# Patient Record
Sex: MALE | Race: WHITE | NOT HISPANIC OR LATINO | Employment: UNEMPLOYED | URBAN - NONMETROPOLITAN AREA
[De-identification: names, ages, dates, MRNs, and addresses within clinical notes are randomized per-mention and may not be internally consistent; named-entity substitution may affect disease eponyms.]

---

## 2017-04-03 ENCOUNTER — COMMUNICATION - GICH (OUTPATIENT)
Dept: FAMILY MEDICINE | Facility: OTHER | Age: 62
End: 2017-04-03

## 2017-04-03 DIAGNOSIS — Z86.59 PERSONAL HISTORY OF OTHER MENTAL AND BEHAVIORAL DISORDERS: ICD-10-CM

## 2017-04-11 ENCOUNTER — HISTORY (OUTPATIENT)
Dept: FAMILY MEDICINE | Facility: OTHER | Age: 62
End: 2017-04-11

## 2017-04-11 ENCOUNTER — AMBULATORY - GICH (OUTPATIENT)
Dept: FAMILY MEDICINE | Facility: OTHER | Age: 62
End: 2017-04-11

## 2017-04-11 ENCOUNTER — OFFICE VISIT - GICH (OUTPATIENT)
Dept: FAMILY MEDICINE | Facility: OTHER | Age: 62
End: 2017-04-11

## 2017-04-11 DIAGNOSIS — Z00.00 ENCOUNTER FOR GENERAL ADULT MEDICAL EXAMINATION WITHOUT ABNORMAL FINDINGS: ICD-10-CM

## 2017-04-11 DIAGNOSIS — N52.1 ERECTILE DYSFUNCTION DUE TO DISEASES CLASSIFIED ELSEWHERE: ICD-10-CM

## 2017-04-11 DIAGNOSIS — E78.5 HYPERLIPIDEMIA: ICD-10-CM

## 2017-04-11 DIAGNOSIS — Z86.59 PERSONAL HISTORY OF OTHER MENTAL AND BEHAVIORAL DISORDERS: ICD-10-CM

## 2017-04-14 ENCOUNTER — AMBULATORY - GICH (OUTPATIENT)
Dept: LAB | Facility: OTHER | Age: 62
End: 2017-04-14

## 2017-04-14 DIAGNOSIS — E78.5 HYPERLIPIDEMIA: ICD-10-CM

## 2017-04-14 DIAGNOSIS — Z00.00 ENCOUNTER FOR GENERAL ADULT MEDICAL EXAMINATION WITHOUT ABNORMAL FINDINGS: ICD-10-CM

## 2017-04-14 LAB
A/G RATIO - HISTORICAL: 1.5 (ref 1–2)
ABSOLUTE BASOPHILS - HISTORICAL: 0.1 THOU/CU MM
ABSOLUTE EOSINOPHILS - HISTORICAL: 0.3 THOU/CU MM
ABSOLUTE LYMPHOCYTES - HISTORICAL: 2.9 THOU/CU MM (ref 0.9–2.9)
ABSOLUTE MONOCYTES - HISTORICAL: 0.7 THOU/CU MM
ABSOLUTE NEUTROPHILS - HISTORICAL: 4.5 THOU/CU MM (ref 1.7–7)
ALBUMIN SERPL-MCNC: 4.3 G/DL (ref 3.5–5.7)
ALP SERPL-CCNC: 72 IU/L (ref 34–104)
ALT (SGPT) - HISTORICAL: 31 IU/L (ref 7–52)
ANION GAP - HISTORICAL: 9 (ref 5–18)
AST SERPL-CCNC: 20 IU/L (ref 13–39)
BASOPHILS # BLD AUTO: 1.4 %
BILIRUB SERPL-MCNC: 0.6 MG/DL (ref 0.3–1)
BILIRUB UR QL: NEGATIVE
BUN SERPL-MCNC: 20 MG/DL (ref 7–25)
BUN/CREAT RATIO - HISTORICAL: 22
CALCIUM SERPL-MCNC: 9.3 MG/DL (ref 8.6–10.3)
CHLORIDE SERPLBLD-SCNC: 105 MMOL/L (ref 98–107)
CHOL/HDL RATIO - HISTORICAL: 6.16
CHOLESTEROL TOTAL: 234 MG/DL
CLARITY, URINE: CLEAR CLARITY
CO2 SERPL-SCNC: 23 MMOL/L (ref 21–31)
COLOR UR: YELLOW COLOR
CREAT SERPL-MCNC: 0.92 MG/DL (ref 0.7–1.3)
EOSINOPHIL NFR BLD AUTO: 3 %
ERYTHROCYTE [DISTWIDTH] IN BLOOD BY AUTOMATED COUNT: 11.9 % (ref 11.5–15.5)
GFR IF NOT AFRICAN AMERICAN - HISTORICAL: >60 ML/MIN/1.73M2
GLOBULIN - HISTORICAL: 2.9 G/DL (ref 2–3.7)
GLUCOSE SERPL-MCNC: 97 MG/DL (ref 70–105)
GLUCOSE URINE: NEGATIVE MG/DL
HCT VFR BLD AUTO: 50.4 % (ref 37–53)
HDLC SERPL-MCNC: 38 MG/DL (ref 23–92)
HEMOGLOBIN: 17.1 G/DL (ref 13.5–17.5)
KETONES UR QL: NEGATIVE MG/DL
LDLC SERPL CALC-MCNC: 154 MG/DL
LEUKOCYTE ESTERASE URINE: NEGATIVE
LYMPHOCYTES NFR BLD AUTO: 34.9 % (ref 20–44)
MCH RBC QN AUTO: 33.5 PG (ref 26–34)
MCHC RBC AUTO-ENTMCNC: 33.9 G/DL (ref 32–36)
MCV RBC AUTO: 99 FL (ref 80–100)
MONOCYTES NFR BLD AUTO: 7.7 %
NEUTROPHILS NFR BLD AUTO: 53.1 % (ref 42–72)
NITRITE UR QL STRIP: NEGATIVE
NON-HDL CHOLESTEROL - HISTORICAL: 196 MG/DL
OCCULT BLOOD,URINE - HISTORICAL: NEGATIVE
PATIENT STATUS - HISTORICAL: ABNORMAL
PH UR: 5 [PH]
PLATELET # BLD AUTO: 273 THOU/CU MM (ref 140–440)
PMV BLD: 9.2 FL (ref 6.5–11)
POTASSIUM SERPL-SCNC: 3.9 MMOL/L (ref 3.5–5.1)
PROT SERPL-MCNC: 7.2 G/DL (ref 6.4–8.9)
PROTEIN QUALITATIVE,URINE - HISTORICAL: NEGATIVE MG/DL
RED BLOOD COUNT - HISTORICAL: 5.1 MIL/CU MM (ref 4.3–5.9)
SODIUM SERPL-SCNC: 137 MMOL/L (ref 133–143)
SP GR UR STRIP: >=1.03
TRIGL SERPL-MCNC: 212 MG/DL
UROBILINOGEN,QUALITATIVE - HISTORICAL: NORMAL EU/DL
WHITE BLOOD COUNT - HISTORICAL: 8.4 THOU/CU MM (ref 4.5–11)

## 2017-05-15 ENCOUNTER — COMMUNICATION - GICH (OUTPATIENT)
Dept: FAMILY MEDICINE | Facility: OTHER | Age: 62
End: 2017-05-15

## 2017-05-15 ENCOUNTER — HISTORY (OUTPATIENT)
Dept: EMERGENCY MEDICINE | Facility: OTHER | Age: 62
End: 2017-05-15

## 2017-10-08 ENCOUNTER — OFFICE VISIT - GICH (OUTPATIENT)
Dept: FAMILY MEDICINE | Facility: OTHER | Age: 62
End: 2017-10-08

## 2017-10-08 ENCOUNTER — HISTORY (OUTPATIENT)
Dept: FAMILY MEDICINE | Facility: OTHER | Age: 62
End: 2017-10-08

## 2017-10-08 DIAGNOSIS — J01.40 ACUTE PANSINUSITIS: ICD-10-CM

## 2017-11-14 ENCOUNTER — COMMUNICATION - GICH (OUTPATIENT)
Dept: FAMILY MEDICINE | Facility: OTHER | Age: 62
End: 2017-11-14

## 2017-11-14 DIAGNOSIS — Z86.59 PERSONAL HISTORY OF OTHER MENTAL AND BEHAVIORAL DISORDERS: ICD-10-CM

## 2017-12-27 NOTE — PROGRESS NOTES
Patient Information     Patient Name MRN Sex Garland Stein [id number] Male 1955      Progress Notes by Sierra Ibanez NP at 10/8/2017 10:30 AM     Author:  Sierra Ibanez NP Service:  (none) Author Type:  PHYS- Nurse Practitioner     Filed:  10/8/2017 12:30 PM Encounter Date:  10/8/2017 Status:  Signed     :  Sierra Ibanez NP (PHYS- Nurse Practitioner)            Nursing Notes:   Layne Bhandari  10/8/2017 10:58 AM  Signed  Patient presents to the clinic for sinus drainage and head congestion that started around the end of July. Patient reports having green and yellow colored sinus drainage.  Layne MCKEE CMA.......10/8/2017..10:24 AM    SUBJECTIVE:    Garland Rice is a 62 y.o. male who presents for sinus drainage and head congestions off and on since July.     Sinus Problem   This is a new problem. The current episode started more than 1 month ago. The problem has been waxing and waning (Off and on, 2 weeks on, 1 week off, 3 weeks on, ect. ) since onset. There has been no fever. The pain is moderate. Associated symptoms include congestion, coughing, headaches, a hoarse voice, sinus pressure and sneezing. Pertinent negatives include no ear pain, shortness of breath, sore throat or swollen glands. Past treatments include oral decongestants. The treatment provided mild relief.       Current Outpatient Prescriptions on File Prior to Visit       Medication  Sig Dispense Refill     ALPRAZolam (XANAX) 0.5 mg tablet Take 1 tablet by mouth 2 times daily if needed. 180 tablet 3     multivitamin (MVI) tablet Take 1 tablet by mouth once daily.  0     sildenafil citrate (VIAGRA) 100 mg tablet Take 0.5-1 tablets by mouth once daily if needed for Erectile Dysfunction. Take 30min to 4 hours before sexual activity. Max 100mg/24hr. 12 tablet 12     simvastatin (ZOCOR) 40 mg tablet Take 1 tablet by mouth once daily with evening meal. 90 tablet 3     No current facility-administered medications  "on file prior to visit.     and   Patient Active Problem List      Diagnosis Date Noted     H/O adenomatous polyp of colon 03/02/2016     Controlled substance agreement signed 4/11/17 03/01/2016     BACK PAIN 10/17/2011     Diverticulosis of sigmoid colon 01/11/2010     ERECTILE DYSFUNCTION      IRRITABLE BOWEL SYNDROME      ANXIETY DISORDER, HX OF      Hyperlipidemia        REVIEW OF SYSTEMS:  Review of Systems   HENT: Positive for congestion, hoarse voice, sinus pressure and sneezing. Negative for ear pain and sore throat.    Respiratory: Positive for cough. Negative for shortness of breath.    Neurological: Positive for headaches.       OBJECTIVE:  /78  Pulse 80  Temp 97.2  F (36.2  C) (Tympanic)  Ht 1.892 m (6' 2.5\")  Wt 111.8 kg (246 lb 6 oz)  BMI 31.21 kg/m2    EXAM:   Physical Exam   Constitutional: He is well-developed, well-nourished, and in no distress.   HENT:   Head: Normocephalic and atraumatic.   Right Ear: Tympanic membrane and ear canal normal.   Left Ear: Tympanic membrane and ear canal normal.   Nose: Mucosal edema and rhinorrhea present. Right sinus exhibits maxillary sinus tenderness and frontal sinus tenderness. Left sinus exhibits maxillary sinus tenderness and frontal sinus tenderness.   Mouth/Throat: Uvula is midline, oropharynx is clear and moist and mucous membranes are normal.   Eyes: Conjunctivae are normal.   Neck: Neck supple.   Cardiovascular: Normal rate, regular rhythm and normal heart sounds.    Pulmonary/Chest: Effort normal and breath sounds normal. No respiratory distress. He has no wheezes. He has no rales.   Lymphadenopathy:     He has no cervical adenopathy.   Nursing note and vitals reviewed.      ASSESSMENT/PLAN:    ICD-10-CM    1. Subacute pansinusitis J01.40 amoxicillin-clavulanate 875-125 mg tablet (AUGMENTIN)        Plan:  HArd to tell if this is just colds off and on. New s/s for a week. Discussed the likelihood of viral illnesses off and on. Without a CT " hard to tell. Will treat incase this is truly bacterial. OTC and home cares gone over.       SALIMA CHANCE NP ....................  10/8/2017   12:30 PM

## 2017-12-28 NOTE — PATIENT INSTRUCTIONS
Patient Information     Patient Name MRN Garland White [id number] Male 1955      Patient Instructions by Sierra Ibanez NP at 10/8/2017 10:30 AM     Author:  Sierra Ibanez NP Service:  (none) Author Type:  PHYS- Nurse Practitioner     Filed:  10/8/2017 11:05 AM Encounter Date:  10/8/2017 Status:  Signed     :  Sierra Ibanez NP (PHYS- Nurse Practitioner)            What you should do:    Wash your hands often with soap and water    Get plenty of rest and fluids    Apply warm compresses to your face    Use sinus rinses, a humidifier or a vaporizer to add more moisture to your sinus tissues.    Think about using a Neti pot    Take acetaminophen (Tylenol ) or ibuprofen (Advil ) for pain    How will you know this plan is not working - warning signs you should watch for:    Pain or swelling around your eyes    Swollen, painful forehead and/or facial (sinus) pain    When should you be seen again?    If you develop severe headache, double vision, stiff neck or confusion, return right away.    If you have any of the warning signs listed above, return in 1 to 2 days.    If your symptoms do not get better in 7 to 10 days, return at that time.

## 2017-12-28 NOTE — TELEPHONE ENCOUNTER
"Patient Information     Patient Name MRN Garland White [id number] Male 1955      Telephone Encounter by Kathie Siddiqi RN at 2017  8:21 AM     Author:  Kathie Siddiqi RN Service:  (none) Author Type:  NURS- Registered Nurse     Filed:  2017  8:26 AM Encounter Date:  2017 Status:  Signed     :  Kathie Siddiqi RN (NURS- Registered Nurse)            This is a Refill request from: [facility]   Name of Medication: Alprazolam  Quantity requested: 180  Last fill date: 17  Due for refill: yes  Last visit with SHARATH XAVIER was on: 2017 in [facility]  PCP:  Sharath Xavier MD  Controlled Substance Agreement:  2017   Diagnosis r/t this medication request: anxiety disorder    Per last visit note, \"Alprazolam 0.5 twice daily has worked well for years with no change in use ; has contract and will refill 1 year\"    This medication cannot be filled for one year as prescription expires after 6 months. Patient will need new prescription sent to [facility].     Unable to complete prescription refill per RN Medication Refill Policy.................... Kathie Siddiqi RN ....................  2017   8:23 AM          "

## 2017-12-28 NOTE — TELEPHONE ENCOUNTER
Patient Information     Patient Name MRN Sex Garland Stein [id number] Male 1955      Telephone Encounter by Jesi Cardona at 2017  9:09 AM     Author:  Jesi Cardona Service:  (none) Author Type:  (none)     Filed:  2017  9:09 AM Encounter Date:  2017 Status:  Signed     :  Jesi Cardona            Prescription was faxed to patient's pharmacy.    Jesi Cardona LPN....................2017 9:09 AM

## 2017-12-30 NOTE — NURSING NOTE
Patient Information     Patient Name MRN Sex Garland Stein [id number] Male 1955      Nursing Note by Layne Bhandari at 10/8/2017 10:30 AM     Author:  Layne Bhandari Service:  (none) Author Type:  (none)     Filed:  10/8/2017 10:58 AM Encounter Date:  10/8/2017 Status:  Signed     :  aLyne Bhandari            Patient presents to the clinic for sinus drainage and head congestion that started around the end of July. Patient reports having green and yellow colored sinus drainage.  Layne MCKEE, RASHID.......10/8/2017..10:24 AM

## 2018-01-04 NOTE — NURSING NOTE
Patient Information     Patient Name MRN Sex Garland Stein [id number] Male 1955      Nursing Note by Jesi Cardona at 2017 11:00 AM     Author:  Jesi Cardona Service:  (none) Author Type:  (none)     Filed:  2017 11:03 AM Encounter Date:  2017 Status:  Signed     :  Jesi Cardona            Patient presents to the clinic for an annual physical  Jesi TONY Cardona LPN....................2017 10:53 AM

## 2018-01-04 NOTE — TELEPHONE ENCOUNTER
Patient Information     Patient Name MRN Sex Garland Stein [id number] Male 1955      Telephone Encounter by Jesi Cardona at 4/3/2017  4:47 PM     Author:  Jesi Cardona Service:  (none) Author Type:  (none)     Filed:  4/3/2017  4:47 PM Encounter Date:  4/3/2017 Status:  Signed     :  Jesi Cardona            Prescription was faxed to patient's pharmacy.    Jesi Cardona LPN....................4/3/2017 4:47 PM

## 2018-01-04 NOTE — H&P
Patient Information     Patient Name MRN Garland White [id number] Male 1955      H&P by Sharath Xavier MD at 2017 11:00 AM     Author:  Sharath Xavier MD Service:  (none) Author Type:  Physician     Filed:  2017 11:46 AM Encounter Date:  2017 Status:  Signed     :  Sharath Xavier MD (Physician)            Nursing Notes:   Jesi Cardona  2017 11:03 AM  Signed  Patient presents to the clinic for an annual physical  Jesi GALLOJoe Cardona LPN....................2017 10:53 AM    Garland Rice is a 62 y.o. male who presents for   Chief Complaint     Patient presents with       Physical      Annual Px     HPI: Mr. Rice comes in for healthcare maintenance; he does smoke 1/2 ppd and understands the risks. He has LDL of 139 last year and will get fasting lipids in next week.  He takes alprazolam 0.5 twice daily and it is very helpful for him; he has a contract and will renew it. We discussed lab   Past Medical History:     Diagnosis  Date     History of tobacco use      Hx of appendectomy      Hx of urethral stricture      Medullary sponge kidney      Tubular adenoma 2010    w/ severe atypia noted on colonoscopy       Past Surgical History:      Procedure  Laterality Date     APPENDECTOMY       COLONOSCOPY DIAGNOSTIC  3/7/16    F/U        COLONOSCOPY SCREENING      follow-up recommended in 6 months        COLONOSCOPY SCREENING  2010    F/U        URETHROPLASTY       Family History       Problem   Relation Age of Onset     Good Health  Mother      69       Good Health  Father      70       Other  Brother      Hx of glaucoma       Other  Other      No FHx DM or CAD, no cancers in first degree relatives.       Coronary artery disease  Maternal Grandfather      60- lived into 80s- smoked early       Cancer-colon  No Family History      Current Outpatient Prescriptions       Medication  Sig Dispense Refill     ALPRAZolam (XANAX) 0.5 mg  tablet TAKE ONE TABLET BY MOUTH TWICE DAILY AS NEEDED 180 tablet 0     multivitamin (MVI) tablet Take 1 tablet by mouth once daily.  0     No current facility-administered medications for this visit.      Medications have been reviewed by me and are current to the best of my knowledge and ability.    No Known Allergies  REVIEW OF SYSTEMS:  Constitutional: Negative  Eyes: reads with cheaters  Ears, nose, mouth, throat, and face: hearing loss.  Respiratory: Negative  Cardiovascular: Negative  Gastrointestinal: occasional hemorrhoidal irritation- normal colon exam a year ago  Genitourinary:Negative/ some BPH symptoms/ had urethralplasty after urethral stricture surgery  Integument/breast: dry   Musculoskeletal:Negative  Neurological: Negative  Psychiatric: Negative  Allergic/Immunologic: Negative     EXAM:   Vitals:     04/11/17 1058   BP: 118/66   Weight: 114.2 kg (251 lb 12.8 oz)   Height: 1.829 m (6')     General Appearance: Pleasant, alert, appropriate appearance for age. No acute distress  Ear Exam: Normal TM's bilaterally. Normal auditory canals and external ears. Non-tender.  OroPharynx Exam: Dental hygiene adequate. Normal buccal mucosa. Normal pharynx.  Neck Exam: Supple, no masses or nodes.  Thyroid Exam: No nodules or enlargement.  Chest/Respiratory Exam: Normal chest wall and respirations. Clear to auscultation.  Cardiovascular Exam: Regular rate and rhythm. S1, S2, no murmur, click, gallop, or rubs.  Gastrointestinal Exam: Soft, nontender, no abnormal masses or organomegaly.  Rectal Exam: good sphincter tone  Genitourinary Exam Male: Normal.  Lymphatic Exam: Non-palpable nodes in neck, clavicular, axillary, or inguinal regions.  Musculoskeletal Exam: Back is straight and non-tender, full ROM of upper and lower extremities.  Skin: no rash or abnormalities  Neurologic Exam: Nonfocal;  normal gross motor movement, tone, and coordination. No tremor.  Psychiatric Exam: Alert and oriented, appropriate  affect.  ASSESSMENT AND PLAN:  1. ANXIETY DISORDER, HX OF  Alprazolam 0.5 twice daily has worked well for years with no change in use ; has contract and will refill 1 year    2. Hyperlipidemia, unspecified hyperlipidemia type  Lab / should start med as his risk factors are real/ add ASA

## 2018-01-04 NOTE — PROGRESS NOTES
Patient Information     Patient Name MRN Sex Garland Stein [id number] Male 1955      Progress Notes by Sharath Xavier MD at 2017 11:00 AM     Author:  Sharath Xavier MD Service:  (none) Author Type:  Physician     Filed:  2017 11:46 AM Encounter Date:  2017 Status:  Signed     :  Sharath Xavier MD (Physician)            See HP

## 2018-01-04 NOTE — TELEPHONE ENCOUNTER
Patient Information     Patient Name MRN Sex Garland Stein [id number] Male 1955      Telephone Encounter by Sharath Xavier MD at 2017  2:18 PM     Author:  Sharath Xavier MD Service:  (none) Author Type:  Physician     Filed:  2017  2:18 PM Encounter Date:  2017 Status:  Signed     :  Sharath Xavier MD (Physician)            Will do

## 2018-01-04 NOTE — TELEPHONE ENCOUNTER
Patient Information     Patient Name MRN Sex Garland Stein [id number] Male 1955      Telephone Encounter by Kathie Siddiqi RN at 4/3/2017  2:22 PM     Author:  Kathie Siddiqi RN Service:  (none) Author Type:  NURS- Registered Nurse     Filed:  4/3/2017  2:34 PM Encounter Date:  4/3/2017 Status:  Signed     :  Kathie Siddiqi RN (NURS- Registered Nurse)            This is a Refill request from: [facility]   Name of Medication: Xanax  Quantity requested: 180  Last fill date: 9/15/2016  Due for refill: 3/15/2017  Last visit with SHARATH XAVIER was on: 2016 in [facility]  PCP:  Sharath Xavier MD  Controlled Substance Agreement:  2016   Diagnosis r/t this medication request: Anxiety     Patient is due for medication management appointment. Limited refill requested at this time. [employer] message and letter sent for reminder to patient.   Unable to complete prescription refill per RN Medication Refill Policy.................... Kathie Siddiqi RN ....................  4/3/2017   2:24 PM

## 2018-01-05 NOTE — TELEPHONE ENCOUNTER
Patient Information     Patient Name MRN Sex Garland Stein [id number] Male 1955      Telephone Encounter by Jesi Cardona at 5/15/2017  4:26 PM     Author:  Jesi Cardona Service:  (none) Author Type:  (none)     Filed:  5/15/2017  4:29 PM Encounter Date:  5/15/2017 Status:  Signed     :  Jesi Cardona            Patient has a swollen calf, it started a couple of days ago.    No injury, it doesn't really feel warm to the touch but the skin feels tight and it's definitely swollen.    Patient was advised he should be seen today.  He is babysitting for another hour so will go to the ED when he can.    Jesi Cardona LPN....................5/15/2017 4:29 PM

## 2018-01-17 PROBLEM — K58.9 IRRITABLE BOWEL SYNDROME: Status: ACTIVE | Noted: 2018-01-17

## 2018-01-17 PROBLEM — F41.9 ANXIETY: Status: ACTIVE | Noted: 2018-01-17

## 2018-01-17 PROBLEM — E78.5 HYPERLIPIDEMIA: Status: ACTIVE | Noted: 2018-01-17

## 2018-01-17 PROBLEM — F52.8 PSYCHOSEXUAL DYSFUNCTION WITH INHIBITED SEXUAL EXCITEMENT: Status: ACTIVE | Noted: 2018-01-17

## 2018-01-17 RX ORDER — ALPRAZOLAM 0.5 MG
0.5 TABLET ORAL 2 TIMES DAILY PRN
COMMUNITY
Start: 2017-11-16 | End: 2018-02-27

## 2018-01-17 RX ORDER — SILDENAFIL 100 MG/1
50-100 TABLET, FILM COATED ORAL PRN
COMMUNITY
Start: 2017-04-11 | End: 2018-02-27 | Stop reason: DRUGHIGH

## 2018-01-17 RX ORDER — SIMVASTATIN 40 MG
40 TABLET ORAL
COMMUNITY
Start: 2017-04-11 | End: 2018-02-27

## 2018-01-17 RX ORDER — DIPHENOXYLATE HYDROCHLORIDE AND ATROPINE SULFATE 2.5; .025 MG/1; MG/1
1 TABLET ORAL DAILY
COMMUNITY

## 2018-01-27 VITALS
WEIGHT: 251.8 LBS | DIASTOLIC BLOOD PRESSURE: 66 MMHG | HEIGHT: 72 IN | BODY MASS INDEX: 34.1 KG/M2 | SYSTOLIC BLOOD PRESSURE: 118 MMHG

## 2018-01-27 VITALS
HEIGHT: 75 IN | HEART RATE: 80 BPM | WEIGHT: 246.38 LBS | TEMPERATURE: 97.2 F | BODY MASS INDEX: 30.64 KG/M2 | DIASTOLIC BLOOD PRESSURE: 78 MMHG | SYSTOLIC BLOOD PRESSURE: 132 MMHG

## 2018-02-27 ENCOUNTER — TELEPHONE (OUTPATIENT)
Dept: FAMILY MEDICINE | Facility: OTHER | Age: 63
End: 2018-02-27

## 2018-02-27 ENCOUNTER — OFFICE VISIT (OUTPATIENT)
Dept: FAMILY MEDICINE | Facility: OTHER | Age: 63
End: 2018-02-27
Attending: FAMILY MEDICINE
Payer: COMMERCIAL

## 2018-02-27 VITALS
BODY MASS INDEX: 29.82 KG/M2 | SYSTOLIC BLOOD PRESSURE: 130 MMHG | HEIGHT: 75 IN | DIASTOLIC BLOOD PRESSURE: 84 MMHG | HEART RATE: 62 BPM | WEIGHT: 239.8 LBS | TEMPERATURE: 97.1 F

## 2018-02-27 DIAGNOSIS — E78.5 HYPERLIPIDEMIA LDL GOAL <100: ICD-10-CM

## 2018-02-27 DIAGNOSIS — F41.9 ANXIETY: Primary | ICD-10-CM

## 2018-02-27 DIAGNOSIS — N52.9 VASCULOGENIC ERECTILE DYSFUNCTION, UNSPECIFIED VASCULOGENIC ERECTILE DYSFUNCTION TYPE: ICD-10-CM

## 2018-02-27 DIAGNOSIS — E87.20 ACIDOSIS: ICD-10-CM

## 2018-02-27 LAB
ANION GAP SERPL CALCULATED.3IONS-SCNC: 10 MMOL/L (ref 3–14)
BUN SERPL-MCNC: 18 MG/DL (ref 7–25)
CALCIUM SERPL-MCNC: 9.7 MG/DL (ref 8.6–10.3)
CHLORIDE SERPL-SCNC: 106 MMOL/L (ref 98–107)
CHOLEST SERPL-MCNC: 228 MG/DL
CO2 SERPL-SCNC: 22 MMOL/L (ref 21–31)
CREAT SERPL-MCNC: 0.84 MG/DL (ref 0.7–1.3)
GFR SERPL CREATININE-BSD FRML MDRD: >90 ML/MIN/1.7M2
GLUCOSE SERPL-MCNC: 95 MG/DL (ref 70–105)
HDLC SERPL-MCNC: 38 MG/DL (ref 23–92)
LDLC SERPL CALC-MCNC: 135 MG/DL
NONHDLC SERPL-MCNC: 190 MG/DL
POTASSIUM SERPL-SCNC: 4.2 MMOL/L (ref 3.5–5.1)
SODIUM SERPL-SCNC: 138 MMOL/L (ref 134–144)
TRIGL SERPL-MCNC: 273 MG/DL

## 2018-02-27 PROCEDURE — 36415 COLL VENOUS BLD VENIPUNCTURE: CPT | Performed by: FAMILY MEDICINE

## 2018-02-27 PROCEDURE — 80048 BASIC METABOLIC PNL TOTAL CA: CPT | Performed by: FAMILY MEDICINE

## 2018-02-27 PROCEDURE — 99214 OFFICE O/P EST MOD 30 MIN: CPT | Performed by: FAMILY MEDICINE

## 2018-02-27 PROCEDURE — 80061 LIPID PANEL: CPT | Performed by: FAMILY MEDICINE

## 2018-02-27 RX ORDER — SIMVASTATIN 40 MG
40 TABLET ORAL
Qty: 90 TABLET | Refills: 3 | Status: SHIPPED | OUTPATIENT
Start: 2018-02-27 | End: 2019-12-09

## 2018-02-27 RX ORDER — ALPRAZOLAM 0.5 MG
TABLET ORAL
Qty: 180 TABLET | Refills: 5 | Status: SHIPPED | OUTPATIENT
Start: 2018-02-27 | End: 2018-11-12

## 2018-02-27 RX ORDER — SILDENAFIL CITRATE 20 MG/1
TABLET ORAL
Qty: 50 TABLET | Refills: 11 | Status: SHIPPED | OUTPATIENT
Start: 2018-02-27 | End: 2020-06-30

## 2018-02-27 ASSESSMENT — ANXIETY QUESTIONNAIRES
GAD7 TOTAL SCORE: 4
6. BECOMING EASILY ANNOYED OR IRRITABLE: NOT AT ALL
5. BEING SO RESTLESS THAT IT IS HARD TO SIT STILL: SEVERAL DAYS
2. NOT BEING ABLE TO STOP OR CONTROL WORRYING: NOT AT ALL
3. WORRYING TOO MUCH ABOUT DIFFERENT THINGS: NOT AT ALL
IF YOU CHECKED OFF ANY PROBLEMS ON THIS QUESTIONNAIRE, HOW DIFFICULT HAVE THESE PROBLEMS MADE IT FOR YOU TO DO YOUR WORK, TAKE CARE OF THINGS AT HOME, OR GET ALONG WITH OTHER PEOPLE: NOT DIFFICULT AT ALL
1. FEELING NERVOUS, ANXIOUS, OR ON EDGE: MORE THAN HALF THE DAYS
7. FEELING AFRAID AS IF SOMETHING AWFUL MIGHT HAPPEN: NOT AT ALL

## 2018-02-27 ASSESSMENT — PAIN SCALES - GENERAL: PAINLEVEL: NO PAIN (0)

## 2018-02-27 ASSESSMENT — PATIENT HEALTH QUESTIONNAIRE - PHQ9: 5. POOR APPETITE OR OVEREATING: SEVERAL DAYS

## 2018-02-27 NOTE — TELEPHONE ENCOUNTER
Spoke with the pharmacist at [facility] in regards to patients prescription for Sildenafil. They are needing clarification on the directions as to whether it is three times daily or 2-4 tablets prior to intercourse. They can take a verbal clarification or a new prescription sent over with directions.   Ivanna Tolentino LPN 2/27/2018 11:53 AM

## 2018-02-27 NOTE — LETTER
February 27, 2018      Garland Rice  [address] [address]        Dear ,    We are writing to inform you of your test results.    The lipids are too high- so the med would be good to restart.    Resulted Orders   Basic metabolic panel   Result Value Ref Range    Sodium 138 134 - 144 mmol/L    Potassium 4.2 3.5 - 5.1 mmol/L    Chloride 106 98 - 107 mmol/L    Carbon Dioxide 22 21 - 31 mmol/L    Anion Gap 10 3 - 14 mmol/L    Glucose 95 70 - 105 mg/dL    Urea Nitrogen 18 7 - 25 mg/dL    Creatinine 0.84 0.70 - 1.30 mg/dL    GFR Estimate >90 >60 mL/min/1.7m2    GFR Estimate If Black >90 >60 mL/min/1.7m2    Calcium 9.7 8.6 - 10.3 mg/dL   Lipid Profile   Result Value Ref Range    Cholesterol 228 (H) <200 mg/dL    Triglycerides 273 (H) <150 mg/dL      Comment:      Borderline high:  150-199 mg/dl  High:             200-499 mg/dl  Very high:       >499 mg/dl      HDL Cholesterol 38 23 - 92 mg/dL    LDL Cholesterol Calculated 135 (H) <100 mg/dL      Comment:      Above desirable:  100-129 mg/dl  Borderline High:  130-159 mg/dL  High:             160-189 mg/dL  Very high:       >189 mg/dl      Non HDL Cholesterol 190 (H) <130 mg/dL      Comment:      Above Desirable:  130-159 mg/dl  Borderline high:  160-189 mg/dl  High:             190-219 mg/dl  Very high:       >219 mg/dl         If you have any questions or concerns, please call the clinic at the number listed above.       Sincerely,  BENY JON MD on 2/27/2018 at 2:21 PM       BENY JON MD

## 2018-02-27 NOTE — PROGRESS NOTES
"  SUBJECTIVE:   Garland Rice is a 63 year old male who presents to clinic today for the following health issues:  Comes in for refill of medications- he has taken alprazolam for anxiety for decades w excellent results and wo increase use. He has failed SSRI approach. 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 He takes 0.5 mg up to twice daily most days but occasionally he does not take it. H edid not like being on simvastatin so he stopped/ he would like lab work today/ he would like refill of sildenafil- cost is an issue                Problem list and histories reviewed & adjusted, as indicated.  Additional history: as documented        Reviewed and updated as needed this visit by clinical staff  Tobacco  Allergies  Meds  Med Hx  Surg Hx  Fam Hx  Soc Hx      Reviewed and updated as needed this visit by Provider             OBJECTIVE:     /84 (BP Location: Right arm, Patient Position: Sitting, Cuff Size: Adult Large)  Pulse 62  Temp 97.1  F (36.2  C) (Temporal)  Ht 6' 2.5\" (1.892 m)  Wt 239 lb 12.8 oz (108.8 kg)  BMI 30.38 kg/m2  Body mass index is 30.38 kg/(m^2).  GENERAL: healthy, alert and no distress  RESP: lungs clear to auscultation - no rales, rhonchi or wheezes  CV: regular rate and rhythm, normal S1 S2, no S3 or S4, no murmur, click or rub, no peripheral edema and peripheral pulses strong        ASSESSMENT/PLAN:           1. Anxiety  stable  - ALPRAZolam (XANAX) 0.5 MG tablet; And as needed prn  Dispense: 180 tablet; Refill: 5    2. Vasculogenic erectile dysfunction, unspecified vasculogenic erectile dysfunction type    - sildenafil (REVATIO) 20 MG tablet; Take 1 tablet (20 mg) by mouth three times daily for pulmonary hypertension.  Never use with nitroglycerin, terazosin or doxazosin.  Dispense: 50 tablet; Refill: 11    3. Hyperlipidemia LDL goal <100  He stopped med but will restart if indicated  - simvastatin (ZOCOR) 40 MG " tablet; Take 1 tablet (40 mg) by mouth daily (with dinner)  Dispense: 90 tablet; Refill: 3  - Lipid Profile    4. Acidosis  Checking labs  - Basic metabolic panel    See Patient Instructions    BENY JON MD  [facility]

## 2018-02-27 NOTE — NURSING NOTE
Patient presents in the clinic for an annual medication check up.  Ivanna Tolentino LPN 2/27/2018 10:09 AM

## 2018-02-27 NOTE — TELEPHONE ENCOUNTER
Spoke with the pharmacist and informed her of directions before.   Ivanna Tolentino LPN 2/27/2018 1:11 PM

## 2018-02-27 NOTE — MR AVS SNAPSHOT
"              After Visit Summary   2018    Garland Rice    MRN: [id number]           Patient Information     Date Of Birth          1955        Visit Information        Provider Department      2018 10:15 AM Sharath Xavier MD [facility]        Today's Diagnoses     Anxiety    -  1    Vasculogenic erectile dysfunction, unspecified vasculogenic erectile dysfunction type        Hyperlipidemia LDL goal <100        Acidosis           Follow-ups after your visit        Who to contact     If you have questions or need follow up information about today's clinic visit or your schedule please contact [facility] directly at [phone].  Normal or non-critical lab and imaging results will be communicated to you by PushPointhart, letter or phone within 4 business days after the clinic has received the results. If you do not hear from us within 7 days, please contact the clinic through PushPointhart or phone. If you have a critical or abnormal lab result, we will notify you by phone as soon as possible.  Submit refill requests through [employer] or call your pharmacy and they will forward the refill request to us. Please allow 3 business days for your refill to be completed.          Additional Information About Your Visit        MyChart Information     [employer] lets you send messages to your doctor, view your test results, renew your prescriptions, schedule appointments and more. To sign up, go to [email or web address] . Click on \"Log in\" on the left side of the screen, which will take you to the Welcome page. Then click on \"Sign up Now\" on the right side of the page.     You will be asked to enter the access code listed below, as well as some personal information. Please follow the directions to create your username and password.     Your access code is: [email or web address]  Expires: 2018 11:05 AM     Your access code will  in 90 days. If you need help or a new code, " "please call your [place] clinic or [phone].        Care EveryWhere ID     This is your Care EveryWhere ID. This could be used by other organizations to access your [place] medical records  [id number]        Your Vitals Were     Pulse Temperature Height BMI (Body Mass Index)          62 97.1  F (36.2  C) (Temporal) 6' 2.5\" (1.892 m) 30.38 kg/m2         Blood Pressure from Last 3 Encounters:   02/27/18 130/84   10/08/17 132/78   04/11/17 118/66    Weight from Last 3 Encounters:   02/27/18 239 lb 12.8 oz (108.8 kg)   10/08/17 246 lb 6 oz (111.8 kg)   04/11/17 251 lb 12.8 oz (114.2 kg)              We Performed the Following     Basic metabolic panel     Lipid Profile          Today's Medication Changes          These changes are accurate as of 2/27/18 11:05 AM.  If you have any questions, ask your nurse or doctor.               Start taking these medicines.        Dose/Directions    sildenafil 20 MG tablet   Commonly known as:  REVATIO   Used for:  Vasculogenic erectile dysfunction, unspecified vasculogenic erectile dysfunction type   Started by:  Sharath Xavier MD        Take 1 tablet (20 mg) by mouth three times daily for pulmonary hypertension.  Never use with nitroglycerin, terazosin or doxazosin.   Quantity:  50 tablet   Refills:  11         These medicines have changed or have updated prescriptions.        Dose/Directions    ALPRAZolam 0.5 MG tablet   Commonly known as:  XANAX   This may have changed:    - how much to take  - how to take this  - when to take this  - reasons to take this  - additional instructions   Used for:  Anxiety   Changed by:  Sharath Xavier MD        And as needed prn   Quantity:  180 tablet   Refills:  5         Stop taking these medicines if you haven't already. Please contact your care team if you have questions.     sildenafil 100 MG tablet   Commonly known as:  VIAGRA   Stopped by:  Sharath Xavier MD                Where to get your medicines      These medications " were sent to [facility] - [place], [place] - [address] .  [address] [facility] [address]     Phone:  [phone]     sildenafil 20 MG tablet    simvastatin 40 MG tablet         Some of these will need a paper prescription and others can be bought over the counter.  Ask your nurse if you have questions.     Bring a paper prescription for each of these medications     ALPRAZolam 0.5 MG tablet                Primary Care Provider Office Phone # Fax #    Sharath Xavier [phone]       [address] [facility] [address]        Equal Access to Services     [facility]: Hadii aad ku hadasho Soomaali, waaxda luqadaha, qaybta kaalmada adeegyada, donna pastrana hayliza friedman . So [facility] [phone].    ATENCIÓN: Si habla español, tiene a raphael disposición servicios gratuitos de asistencia lingüística. [facility] [phone].    We comply with applicable federal civil rights laws and [place] laws. We do not discriminate on the basis of race, color, national origin, age, disability, sex, sexual orientation, or gender identity.            Thank you!     Thank you for choosing [facility] AND [facility]  for your care. Our goal is always to provide you with excellent care. Hearing back from our patients is one way we can continue to improve our services. Please take a few minutes to complete the written survey that you may receive in the mail after your visit with us. Thank you!             Your Updated Medication List - Protect others around you: Learn how to safely use, store and throw away your medicines at www.disposemymeds.org.          This list is accurate as of 2/27/18 11:05 AM.  Always use your most recent med list.                   Brand Name Dispense Instructions for use Diagnosis    ALPRAZolam 0.5 MG tablet    XANAX    180 tablet    And as needed prn    Anxiety       MULTI-VITAMINS Tabs      Take 1 tablet by mouth daily         sildenafil 20 MG tablet    REVATIO    50 tablet    Take 1 tablet (20 mg) by mouth three times daily for pulmonary hypertension.  Never use with nitroglycerin, terazosin or doxazosin.    Vasculogenic erectile dysfunction, unspecified vasculogenic erectile dysfunction type       simvastatin 40 MG tablet    ZOCOR    90 tablet    Take 1 tablet (40 mg) by mouth daily (with dinner)    Hyperlipidemia LDL goal <100

## 2018-02-28 ASSESSMENT — ANXIETY QUESTIONNAIRES: GAD7 TOTAL SCORE: 4

## 2018-03-28 ENCOUNTER — MYC MEDICAL ADVICE (OUTPATIENT)
Dept: FAMILY MEDICINE | Facility: OTHER | Age: 63
End: 2018-03-28

## 2018-03-28 DIAGNOSIS — J01.91 ACUTE RECURRENT SINUSITIS, UNSPECIFIED LOCATION: Primary | ICD-10-CM

## 2018-03-28 NOTE — TELEPHONE ENCOUNTER
I do not know this patient and would suggest this be held until Dr. Xavier returns tomorrow morning.    In the meantime he could utilize OTC fluticasone, which is typically first line for sinus infections unless symptoms >10d, fever, and exam findings consistent with sinus infection.

## 2018-03-29 RX ORDER — SULFAMETHOXAZOLE/TRIMETHOPRIM 800-160 MG
1 TABLET ORAL 2 TIMES DAILY
Qty: 28 TABLET | Refills: 0 | Status: SHIPPED | OUTPATIENT
Start: 2018-03-29 | End: 2018-10-17

## 2018-05-10 DIAGNOSIS — F41.9 ANXIETY: ICD-10-CM

## 2018-05-14 RX ORDER — ALPRAZOLAM 0.5 MG
TABLET ORAL
Qty: 180 TABLET | Refills: 1 | OUTPATIENT
Start: 2018-05-14

## 2018-05-14 NOTE — TELEPHONE ENCOUNTER
"Phone call from [facility] seeking clarification on medication sig for: ALPRAZolam (XANAX) 0.5 MG tablet.  Script printed 2/27/2018. LOV 2/27/2018 with Sharath Xavier MD    \"He takes 0.5 mg up to twice daily most days but occasionally he does not take it.\"       \"1. Anxiety  stable  - ALPRAZolam (XANAX) 0.5 MG tablet; And as needed prn  Dispense: 180 tablet; Refill: 5\"    Verified with pharmacy and they will refill medication.    Lula Shankar RN  ....................  5/14/2018   2:30 PM      "

## 2018-07-24 NOTE — PROGRESS NOTES
Patient Information     Patient Name  Garland Rice MRN  [id number] Sex  Male   1955      Letter by Sharath Xavier MD at      Author:  Sharath Xavier MD Service:  (none) Author Type:  (none)    Filed:   Encounter Date:  4/3/2017 Status:  (Other)           Garland Rice  [address] [facility] [address]          April 3, 2017    Dear Mr. Rice:    This letter is to remind you that you are due for your annual exam with Sharath Xavier MD. Your last comprehensive visit was more than 12 months ago.    A refill of Xanax has been requested by your pharmacy. This request has been sent to Sharath Xavier MD for consideration of a LIMITED refill. Additional refills require you to complete this appointment.    Please call the clinic at [phone] to schedule your appointment.    Thank you for choosing [facility] and [facility] for your health care needs.    Sincerely,    Refill RN  [facility]

## 2018-10-17 ENCOUNTER — OFFICE VISIT (OUTPATIENT)
Dept: INTERNAL MEDICINE | Facility: OTHER | Age: 63
End: 2018-10-17
Attending: INTERNAL MEDICINE
Payer: COMMERCIAL

## 2018-10-17 ENCOUNTER — HOSPITAL ENCOUNTER (OUTPATIENT)
Dept: GENERAL RADIOLOGY | Facility: OTHER | Age: 63
Discharge: HOME | End: 2018-10-17
Attending: INTERNAL MEDICINE | Admitting: INTERNAL MEDICINE
Payer: COMMERCIAL

## 2018-10-17 VITALS
WEIGHT: 238.6 LBS | BODY MASS INDEX: 30.22 KG/M2 | DIASTOLIC BLOOD PRESSURE: 84 MMHG | SYSTOLIC BLOOD PRESSURE: 120 MMHG | HEART RATE: 68 BPM

## 2018-10-17 DIAGNOSIS — F17.210 CIGARETTE SMOKER: ICD-10-CM

## 2018-10-17 DIAGNOSIS — R10.11 RUQ ABDOMINAL PAIN: Primary | ICD-10-CM

## 2018-10-17 DIAGNOSIS — R10.11 RUQ ABDOMINAL PAIN: ICD-10-CM

## 2018-10-17 LAB
ALBUMIN SERPL-MCNC: 4.4 G/DL (ref 3.5–5.7)
ALBUMIN UR-MCNC: NEGATIVE MG/DL
ALP SERPL-CCNC: 70 U/L (ref 34–104)
ALT SERPL W P-5'-P-CCNC: 20 U/L (ref 7–52)
ANION GAP SERPL CALCULATED.3IONS-SCNC: 5 MMOL/L (ref 3–14)
APPEARANCE UR: CLEAR
AST SERPL W P-5'-P-CCNC: 18 U/L (ref 13–39)
BILIRUB SERPL-MCNC: 0.5 MG/DL (ref 0.3–1)
BILIRUB UR QL STRIP: NEGATIVE
BUN SERPL-MCNC: 17 MG/DL (ref 7–25)
CALCIUM SERPL-MCNC: 9.6 MG/DL (ref 8.6–10.3)
CHLORIDE SERPL-SCNC: 107 MMOL/L (ref 98–107)
CO2 SERPL-SCNC: 25 MMOL/L (ref 21–31)
COLOR UR AUTO: YELLOW
CREAT SERPL-MCNC: 0.89 MG/DL (ref 0.7–1.3)
ERYTHROCYTE [DISTWIDTH] IN BLOOD BY AUTOMATED COUNT: 13.4 % (ref 10–15)
GFR SERPL CREATININE-BSD FRML MDRD: 87 ML/MIN/1.7M2
GLUCOSE SERPL-MCNC: 102 MG/DL (ref 70–105)
GLUCOSE UR STRIP-MCNC: NEGATIVE MG/DL
HCT VFR BLD AUTO: 47.3 % (ref 40–53)
HGB BLD-MCNC: 16.2 G/DL (ref 13.3–17.7)
HGB UR QL STRIP: ABNORMAL
KETONES UR STRIP-MCNC: NEGATIVE MG/DL
LEUKOCYTE ESTERASE UR QL STRIP: NEGATIVE
MCH RBC QN AUTO: 33.1 PG (ref 26.5–33)
MCHC RBC AUTO-ENTMCNC: 34.2 G/DL (ref 31.5–36.5)
MCV RBC AUTO: 97 FL (ref 78–100)
NITRATE UR QL: NEGATIVE
NON-SQ EPI CELLS #/AREA URNS LPF: NORMAL /LPF
PH UR STRIP: 5.5 PH (ref 5–9)
PLATELET # BLD AUTO: 302 10E9/L (ref 150–450)
POTASSIUM SERPL-SCNC: 4.2 MMOL/L (ref 3.5–5.1)
PROT SERPL-MCNC: 6.9 G/DL (ref 6.4–8.9)
RBC # BLD AUTO: 4.9 10E12/L (ref 4.4–5.9)
RBC #/AREA URNS AUTO: NORMAL /HPF
SODIUM SERPL-SCNC: 137 MMOL/L (ref 134–144)
SOURCE: ABNORMAL
SP GR UR STRIP: >1.03 (ref 1–1.03)
UROBILINOGEN UR STRIP-ACNC: 0.2 EU/DL (ref 0.2–1)
WBC # BLD AUTO: 7.6 10E9/L (ref 4–11)
WBC #/AREA URNS AUTO: NORMAL /HPF

## 2018-10-17 PROCEDURE — 81001 URINALYSIS AUTO W/SCOPE: CPT | Performed by: INTERNAL MEDICINE

## 2018-10-17 PROCEDURE — 36415 COLL VENOUS BLD VENIPUNCTURE: CPT | Performed by: INTERNAL MEDICINE

## 2018-10-17 PROCEDURE — 85027 COMPLETE CBC AUTOMATED: CPT | Performed by: INTERNAL MEDICINE

## 2018-10-17 PROCEDURE — 71046 X-RAY EXAM CHEST 2 VIEWS: CPT

## 2018-10-17 PROCEDURE — 80053 COMPREHEN METABOLIC PANEL: CPT | Performed by: INTERNAL MEDICINE

## 2018-10-17 PROCEDURE — 99214 OFFICE O/P EST MOD 30 MIN: CPT | Performed by: INTERNAL MEDICINE

## 2018-10-17 ASSESSMENT — ENCOUNTER SYMPTOMS
ENDOCRINE NEGATIVE: 1
ALLERGIC/IMMUNOLOGIC NEGATIVE: 1
HEMATOLOGIC/LYMPHATIC NEGATIVE: 1
CONSTITUTIONAL NEGATIVE: 1

## 2018-10-17 NOTE — MR AVS SNAPSHOT
After Visit Summary   10/17/2018    Garland Rice    MRN: [id number]           Patient Information     Date Of Birth          1955        Visit Information        Provider Department      10/17/2018 7:40 AM Delta Falcon MD [facility]        Today's Diagnoses     RUQ abdominal pain    -  1    Cigarette smoker           Follow-ups after your visit        Future tests that were ordered for you today     Open Future Orders        Priority Expected Expires Ordered    Comprehensive Metabolic Panel Routine  10/17/2019 10/17/2018    CBC W PLT No Diff Routine  10/17/2019 10/17/2018    CT Abdomen Pelvis w Contrast Routine  10/17/2019 10/17/2018    XR Chest 2 Views Routine 10/17/2018 10/17/2019 10/17/2018            Who to contact     If you have questions or need follow up information about today's clinic visit or your schedule please contact [facility] directly at [phone].  Normal or non-critical lab and imaging results will be communicated to you by NexSteppehart, letter or phone within 4 business days after the clinic has received the results. If you do not hear from us within 7 days, please contact the clinic through NexStep[employer] or phone. If you have a critical or abnormal lab result, we will notify you by phone as soon as possible.  Submit refill requests through [employer] or call your pharmacy and they will forward the refill request to us. Please allow 3 business days for your refill to be completed.          Additional Information About Your Visit        NexSteppehart Information     [employer] gives you secure access to your electronic health record. If you see a primary care provider, you can also send messages to your care team and make appointments. If you have questions, please call your primary care clinic.  If you do not have a primary care provider, please call [phone] and they will assist you.        Care EveryWhere ID     This is your Care  EveryWhere ID. This could be used by other organizations to access your [place] medical records  [id number]        Your Vitals Were     Pulse BMI (Body Mass Index)                68 30.22 kg/m2           Blood Pressure from Last 3 Encounters:   10/17/18 120/84   02/27/18 130/84   10/08/17 132/78    Weight from Last 3 Encounters:   10/17/18 238 lb 9.6 oz (108.2 kg)   02/27/18 239 lb 12.8 oz (108.8 kg)   10/08/17 246 lb 6 oz (111.8 kg)              We Performed the Following     *UA reflex to Microscopic          Today's Medication Changes          These changes are accurate as of 10/17/18  8:11 AM.  If you have any questions, ask your nurse or doctor.               Stop taking these medicines if you haven't already. Please contact your care team if you have questions.     sulfamethoxazole-trimethoprim 800-160 MG per tablet   Commonly known as:  BACTRIM DS/SEPTRA DS   Stopped by:  Delta Falcon MD                    Primary Care Provider Office Phone # Fax #    Delta Falcon [phone] [address] [facility] [address]        Equal Access to Services     [facility] AH: Hadii marylou Wilkinson, waaxda lutk, qaybta kaalmada dave, donna mccullough. So [facility] [phone].    ATENCIÓN: Si habla español, tiene a raphael disposición servicios gratuitos de asistencia lingüística. [facility] [phone].    We comply with applicable federal civil rights laws and [place] laws. We do not discriminate on the basis of race, color, national origin, age, disability, sex, sexual orientation, or gender identity.            Thank you!     Thank you for choosing [facility] AND [facility]  for your care. Our goal is always to provide you with excellent care. Hearing back from our patients is one way we can continue to improve our services. Please take a few minutes to complete the written survey that you may receive in the mail after your visit with us.  Thank you!             Your Updated Medication List - Protect others around you: Learn how to safely use, store and throw away your medicines at www.disposemymeds.org.          This list is accurate as of 10/17/18  8:11 AM.  Always use your most recent med list.                   Brand Name Dispense Instructions for use Diagnosis    ALPRAZolam 0.5 MG tablet    XANAX    180 tablet    And as needed prn    Anxiety       MULTI-VITAMINS Tabs      Take 1 tablet by mouth daily        sildenafil 20 MG tablet    REVATIO    50 tablet    Take 1 tablet (20 mg) by mouth three times daily for pulmonary hypertension.  Never use with nitroglycerin, terazosin or doxazosin.    Vasculogenic erectile dysfunction, unspecified vasculogenic erectile dysfunction type       simvastatin 40 MG tablet    ZOCOR    90 tablet    Take 1 tablet (40 mg) by mouth daily (with dinner)    Hyperlipidemia LDL goal <100

## 2018-10-17 NOTE — PROGRESS NOTES
Chief Complaint:  Abdominal pain.    HPI: This patient is here today with a complaint of right sided lower chest and abdominal pain.  This has been present for a couple of months.  It does not seem to be getting any worse.  He does not have any change in bowel or bladder with this.  He has not lost any weight.  He has not had any fever.  Food does not seem to have any effect on this.  He has a history of kidney stones but this is not reminiscent of his kidney stones in any way.  There was no injury to account for this.  It is an achy discomfort and he feels as though there is a little bit of a bulge present as well.  He is here to have this evaluated further.    Medications are reconciled.  Past medical history, past surgical history, family history and social histories are all reviewed and updated.    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     Colonic polyp      Erectile dysfunction      Hyperlipidemia      Kidney stones      Medullary cystic kidney     No Comments Provided     Personal history of nicotine dependence     No Comments Provided       Past Surgical History:   Procedure Laterality Date     APPENDECTOMY OPEN      No Comments Provided     COLONOSCOPY      1/09,follow-up recommended in 6 months     COLONOSCOPY      01/11/2010,F/U 2015     COLONOSCOPY      3/7/16,F/U 2021     URETHROPLASTY         No Known Allergies    Current Outpatient Prescriptions   Medication Sig Dispense Refill     ALPRAZolam (XANAX) 0.5 MG tablet And as needed prn 180 tablet 5     Multiple Vitamin (MULTI-VITAMINS) TABS Take 1 tablet by mouth daily       sildenafil (REVATIO) 20 MG tablet Take 1 tablet (20 mg) by mouth three times daily for pulmonary hypertension.  Never use with nitroglycerin, terazosin or doxazosin. 50 tablet 11     simvastatin (ZOCOR) 40 MG tablet Take 1 tablet (40 mg) by mouth daily (with dinner) 90 tablet 3       Social History     Social  History     Marital status:      Spouse name: N/A     Number of children: N/A     Years of education: N/A     Occupational History     Not on file.     Social History Main Topics     Smoking status: Current Every Day Smoker     Packs/day: 0.50     Years: 40.00     Types: Cigarettes     Smokeless tobacco: Never Used     Alcohol use Yes      Comment: Alcoholic Drinks/day: 10 oz per week     Drug use: No     Sexual activity: Not on file     Other Topics Concern     Not on file     Social History Narrative    Travels to work in [place] as a  for 100+ day stretches. Will be gone for 6+ months a year.  , wife works at [employer]. No financial or relationship concerns.       Review of Systems   Constitutional: Negative.    Endocrine: Negative.    Skin: Negative.    Allergic/Immunologic: Negative.    Hematological: Negative.        Physical Exam   Constitutional: He appears well-developed and well-nourished. No distress.   Cardiovascular: Normal rate, regular rhythm and normal heart sounds.  Exam reveals no gallop and no friction rub.    No murmur heard.  Pulmonary/Chest: He has decreased breath sounds. He has no wheezes. He has rhonchi. He has no rales.   Abdominal: Soft. Bowel sounds are normal. He exhibits no distension and no mass. There is tenderness. There is no rebound and no guarding. No hernia.       Skin: He is not diaphoretic.   Nursing note and vitals reviewed.      Assessment:      ICD-10-CM    1. RUQ abdominal pain R10.11 XR Chest 2 Views     Comprehensive Metabolic Panel     CBC W PLT No Diff     *UA reflex to Microscopic     CT Abdomen Pelvis w Contrast   2. Cigarette smoker F17.210 XR Chest 2 Views       Plan: This patient has abdominal discomfort, etiology unclear.  Exam is unremarkable.  Chest x-ray negative.  Further testing is warranted including lab and urine as well as CT scan, I will let him know the results and we will proceed from there as indicated.  Strongly encouraged him to  discontinue smoking.  He will need refills on his medications in November, I told him we would certainly do that for him.

## 2018-10-17 NOTE — LETTER
October 17, 2018      Garland Rice  [address]  [place] [address]        Dear Garland Rice,    Below are the results of your recent labs:    Results for orders placed or performed in visit on 10/17/18   *UA reflex to Microscopic   Result Value Ref Range    Color Urine Yellow     Appearance Urine Clear     Glucose Urine Negative NEG^Negative mg/dL    Bilirubin Urine Negative NEG^Negative    Ketones Urine Negative NEG^Negative mg/dL    Specific Gravity Urine >1.030 (H) 1.000 - 1.030    Blood Urine Trace (A) NEG^Negative    pH Urine 5.5 5.0 - 9.0 pH    Protein Albumin Urine Negative NEG^Negative mg/dL    Urobilinogen Urine 0.2 0.2 - 1.0 EU/dL    Nitrite Urine Negative NEG^Negative    Leukocyte Esterase Urine Negative NEG^Negative    Source Midstream Urine    Comprehensive Metabolic Panel   Result Value Ref Range    Sodium 137 134 - 144 mmol/L    Potassium 4.2 3.5 - 5.1 mmol/L    Chloride 107 98 - 107 mmol/L    Carbon Dioxide 25 21 - 31 mmol/L    Anion Gap 5 3 - 14 mmol/L    Glucose 102 70 - 105 mg/dL    Urea Nitrogen 17 7 - 25 mg/dL    Creatinine 0.89 0.70 - 1.30 mg/dL    GFR Estimate 87 >60 mL/min/1.7m2    GFR Estimate If Black >90 >60 mL/min/1.7m2    Calcium 9.6 8.6 - 10.3 mg/dL    Bilirubin Total 0.5 0.3 - 1.0 mg/dL    Albumin 4.4 3.5 - 5.7 g/dL    Protein Total 6.9 6.4 - 8.9 g/dL    Alkaline Phosphatase 70 34 - 104 U/L    ALT 20 7 - 52 U/L    AST 18 13 - 39 U/L   CBC W PLT No Diff   Result Value Ref Range    WBC 7.6 4.0 - 11.0 10e9/L    RBC Count 4.90 4.4 - 5.9 10e12/L    Hemoglobin 16.2 13.3 - 17.7 g/dL    Hematocrit 47.3 40.0 - 53.0 %    MCV 97 78 - 100 fl    MCH 33.1 (H) 26.5 - 33.0 pg    MCHC 34.2 31.5 - 36.5 g/dL    RDW 13.4 10.0 - 15.0 %    Platelet Count 302 150 - 450 10e9/L   Urine Microscopic   Result Value Ref Range    WBC Urine 0 - 5 OTO5^0 - 5 /HPF    RBC Urine O - 2 OTO2^O - 2 /HPF    Squamous Epithelial /LPF Urine Few FEW^Few /LPF        Your recent blood and urine  tests are normal.  Congratulations on this report.    Sincerely,        Delta Falcon MD  Internal Medicine  [facility]

## 2018-10-17 NOTE — NURSING NOTE
"The patient is here today to be seen for right sided abdominal pain.  Caryn Serrato LPN on 10/17/2018 at 7:40 AM  Chief Complaint   Patient presents with     Abdominal Pain     right side       Initial /84 (BP Location: Right arm, Patient Position: Sitting, Cuff Size: Adult Large)  Pulse 68  Wt 238 lb 9.6 oz (108.2 kg)  BMI 30.22 kg/m2 Estimated body mass index is 30.22 kg/(m^2) as calculated from the following:    Height as of 2/27/18: 6' 2.5\" (1.892 m).    Weight as of this encounter: 238 lb 9.6 oz (108.2 kg).  Medication Reconciliation: incomplete    Caryn Serrato LPN    "

## 2018-10-18 ASSESSMENT — PATIENT HEALTH QUESTIONNAIRE - PHQ9: SUM OF ALL RESPONSES TO PHQ QUESTIONS 1-9: 0

## 2018-10-19 ENCOUNTER — HOSPITAL ENCOUNTER (OUTPATIENT)
Dept: CT IMAGING | Facility: OTHER | Age: 63
Discharge: HOME | End: 2018-10-19
Attending: INTERNAL MEDICINE | Admitting: INTERNAL MEDICINE
Payer: COMMERCIAL

## 2018-10-19 DIAGNOSIS — R10.11 RUQ ABDOMINAL PAIN: ICD-10-CM

## 2018-10-19 PROCEDURE — 74177 CT ABD & PELVIS W/CONTRAST: CPT

## 2018-10-19 PROCEDURE — 25500064 ZZH RX 255 OP 636: Performed by: INTERNAL MEDICINE

## 2018-10-19 RX ADMIN — IOHEXOL 100 ML: 350 INJECTION, SOLUTION INTRAVENOUS at 10:24

## 2018-11-12 ENCOUNTER — MYC REFILL (OUTPATIENT)
Dept: FAMILY MEDICINE | Facility: OTHER | Age: 63
End: 2018-11-12

## 2018-11-12 DIAGNOSIS — F41.8 OTHER SPECIFIED ANXIETY DISORDERS: Primary | ICD-10-CM

## 2018-11-13 NOTE — TELEPHONE ENCOUNTER
Message from [employer]:  Original authorizing provider: MD Lynda SEWELLviki HOLDERJoe Rice would like a refill of the following medications:  ALPRAZolam (XANAX) 0.5 MG tablet [BENY JON MD]    Preferred pharmacy: [facility] [address] [place] [address]    Comment:

## 2018-11-14 NOTE — TELEPHONE ENCOUNTER
Patient sent [email or web address] message requesting a refill of the following, be sent to [facility]: ALPRAZolam (XANAX) 0.5 MG tablet.    Last Written Prescription:  ALPRAZolam (XANAX) 0.5 MG tablet 180 tablet 5 2/27/2018  No   Sig: And as needed prn   Class: Local Print   Last Office Visit: 10/17/18  Future Office visit: None.    Routing refill request to teamlet for review/approval because:    Drug not on the [facility], [facility] or [facility] refill protocol or controlled substance.    Sig unclear.    Per LOV note:.  He will need refills on his medications in November, I told him we would certainly do that for him.    Noted MAF is out today and the rest of the week and scheduled to return Monday 11/19. Unable to reach Patient to find out if he has enough to wait until then.    Unable to complete prescription refill per RN Medication Refill Policy. Tamar Stephenson RN .............. 11/14/2018  4:07 PM

## 2018-11-15 NOTE — TELEPHONE ENCOUNTER
Please call patient and verify dosing instructions.  This is a controlled medication and should have an appointment.

## 2018-11-15 NOTE — TELEPHONE ENCOUNTER
"Left message on machine to call back.     [employer] message sent to Patient:    Jorge L,  We received your request for a refill of XANAX 0.5 mg tablet. Dr. Falcon is out and scheduled to return Monday 11/19. In his absence, your request was sent to another provider. We are needing verification of dosing instructions as sig reads: \"And as needed prn.\" We have tried reaching you by phone and left 2 messages. Please respond via [employer], if able, with verification of dosing. Also, this is a controlled medication and should have an appointment. Thank you.  Refill RN  [facility]    Tamar Stephenson RN .............. 11/15/2018  9:23 AM    "

## 2018-11-16 RX ORDER — ALPRAZOLAM 0.5 MG
TABLET ORAL
Qty: 30 TABLET | Refills: 1 | Status: SHIPPED | OUTPATIENT
Start: 2018-11-16 | End: 2018-12-07

## 2018-11-16 NOTE — TELEPHONE ENCOUNTER
"Patient responded via [employer]obi:    Navin Boyle, I'm at [facility], sorry we don't have cell service here. Thanks for reaching out via [employer]. My bottle says,\" take one tablet twice daily as needed\" . Dr. Xavier tried to get the wording correct. He also was able to give me a 3 month supply since I'm working remote most of the time. I'll be home at [place] and would be happy to come in for a checkup. I hope this answers your questions. Feel free to ask any additional questions you may have. Thanks, Jorge L Rice     In the absence of Dr. Xavier, Dr. Falcon and Dr. Rush, clarification information will be routed to  sadie, Elisabet Parry for refill consideration. Tamar Stephenson RN .............. 11/16/2018  10:16 AM        "

## 2018-11-16 NOTE — TELEPHONE ENCOUNTER
Noted refill request approved:    ALPRAZolam (XANAX) 0.5 MG tablet 30 tablet 1 11/16/2018  No   Sig: Take 1 tablet twice daily as needed.   Class: Local Print     [facility] - [place], [place] - [address]    Patient notified of this information, via [employer]. Writer will close encounter at this time. Tamar Stephenson RN .............. 11/16/2018  12:04 PM

## 2018-12-07 ENCOUNTER — OFFICE VISIT (OUTPATIENT)
Dept: INTERNAL MEDICINE | Facility: OTHER | Age: 63
End: 2018-12-07
Attending: INTERNAL MEDICINE
Payer: COMMERCIAL

## 2018-12-07 VITALS
DIASTOLIC BLOOD PRESSURE: 78 MMHG | BODY MASS INDEX: 31.1 KG/M2 | SYSTOLIC BLOOD PRESSURE: 110 MMHG | RESPIRATION RATE: 18 BRPM | HEART RATE: 68 BPM | WEIGHT: 242.3 LBS | HEIGHT: 74 IN

## 2018-12-07 DIAGNOSIS — F41.8 OTHER SPECIFIED ANXIETY DISORDERS: ICD-10-CM

## 2018-12-07 DIAGNOSIS — F41.9 ANXIETY: Primary | ICD-10-CM

## 2018-12-07 PROCEDURE — 99213 OFFICE O/P EST LOW 20 MIN: CPT | Performed by: INTERNAL MEDICINE

## 2018-12-07 RX ORDER — ALPRAZOLAM 0.5 MG
TABLET ORAL
Qty: 180 TABLET | Refills: 1 | Status: SHIPPED | OUTPATIENT
Start: 2018-12-07 | End: 2019-06-13

## 2018-12-07 ASSESSMENT — ANXIETY QUESTIONNAIRES
7. FEELING AFRAID AS IF SOMETHING AWFUL MIGHT HAPPEN: NOT AT ALL
IF YOU CHECKED OFF ANY PROBLEMS ON THIS QUESTIONNAIRE, HOW DIFFICULT HAVE THESE PROBLEMS MADE IT FOR YOU TO DO YOUR WORK, TAKE CARE OF THINGS AT HOME, OR GET ALONG WITH OTHER PEOPLE: NOT DIFFICULT AT ALL
6. BECOMING EASILY ANNOYED OR IRRITABLE: NOT AT ALL
GAD7 TOTAL SCORE: 0
5. BEING SO RESTLESS THAT IT IS HARD TO SIT STILL: NOT AT ALL
3. WORRYING TOO MUCH ABOUT DIFFERENT THINGS: NOT AT ALL
1. FEELING NERVOUS, ANXIOUS, OR ON EDGE: NOT AT ALL
2. NOT BEING ABLE TO STOP OR CONTROL WORRYING: NOT AT ALL

## 2018-12-07 ASSESSMENT — PATIENT HEALTH QUESTIONNAIRE - PHQ9
5. POOR APPETITE OR OVEREATING: NOT AT ALL
SUM OF ALL RESPONSES TO PHQ QUESTIONS 1-9: 0

## 2018-12-07 ASSESSMENT — ENCOUNTER SYMPTOMS
CONSTITUTIONAL NEGATIVE: 1
ENDOCRINE NEGATIVE: 1
ALLERGIC/IMMUNOLOGIC NEGATIVE: 1
EYES NEGATIVE: 1

## 2018-12-07 NOTE — NURSING NOTE
"The patient is here today to get a refill on his medications.  Caryn Serrato LPN on 12/7/2018 at 2:50 PM  Chief Complaint   Patient presents with     Recheck Medication       Initial /78 (BP Location: Right arm, Patient Position: Sitting, Cuff Size: Adult Large)  Pulse 68  Resp 18  Ht 6' 2\" (1.88 m)  Wt 242 lb 4.8 oz (109.9 kg)  BMI 31.11 kg/m2 Estimated body mass index is 31.11 kg/(m^2) as calculated from the following:    Height as of this encounter: 6' 2\" (1.88 m).    Weight as of this encounter: 242 lb 4.8 oz (109.9 kg).  Medication Reconciliation: complete    Caryn Serrato LPN    "

## 2018-12-07 NOTE — MR AVS SNAPSHOT
"              After Visit Summary   12/7/2018    Garland Rice    MRN: [id number]           Patient Information     Date Of Birth          1955        Visit Information        Provider Department      12/7/2018 3:00 PM Delta Falcon MD [facility]        Today's Diagnoses     Anxiety    -  1    Other specified anxiety disorders           Follow-ups after your visit        Who to contact     If you have questions or need follow up information about today's clinic visit or your schedule please contact [facility] directly at [phone].  Normal or non-critical lab and imaging results will be communicated to you by appAttachhart, letter or phone within 4 business days after the clinic has received the results. If you do not hear from us within 7 days, please contact the clinic through [employer] or phone. If you have a critical or abnormal lab result, we will notify you by phone as soon as possible.  Submit refill requests through [employer] or call your pharmacy and they will forward the refill request to us. Please allow 3 business days for your refill to be completed.          Additional Information About Your Visit        MyChart Information     [employer] gives you secure access to your electronic health record. If you see a primary care provider, you can also send messages to your care team and make appointments. If you have questions, please call your primary care clinic.  If you do not have a primary care provider, please call [phone] and they will assist you.        Care EveryWhere ID     This is your Care EveryWhere ID. This could be used by other organizations to access your [place] medical records  [id number]        Your Vitals Were     Pulse Respirations Height BMI (Body Mass Index)          68 18 6' 2\" (1.88 m) 31.11 kg/m2         Blood Pressure from Last 3 Encounters:   12/07/18 110/78   10/17/18 120/84   02/27/18 130/84    Weight from Last 3 Encounters: "   12/07/18 242 lb 4.8 oz (109.9 kg)   10/17/18 238 lb 9.6 oz (108.2 kg)   02/27/18 239 lb 12.8 oz (108.8 kg)              Today, you had the following     No orders found for display         Where to get your medicines      Some of these will need a paper prescription and others can be bought over the counter.  Ask your nurse if you have questions.     Bring a paper prescription for each of these medications     ALPRAZolam 0.5 MG tablet          Primary Care Provider Office Phone # Fax #    Delta Falcon [phone] [address] [facility] [address]        Equal Access to Services     MARKOS GONZALEZ : Hadii marylou Wilkinson, kary easley, ying acosta, donna mccullough. So [facility] [phone].    ATENCIÓN: Si habla español, tiene a raphael disposición servicios gratuitos de asistencia lingüística. Llame al [phone].    We comply with applicable federal civil rights laws and [place] laws. We do not discriminate on the basis of race, color, national origin, age, disability, sex, sexual orientation, or gender identity.            Thank you!     Thank you for choosing [facility] AND [facility]  for your care. Our goal is always to provide you with excellent care. Hearing back from our patients is one way we can continue to improve our services. Please take a few minutes to complete the written survey that you may receive in the mail after your visit with us. Thank you!             Your Updated Medication List - Protect others around you: Learn how to safely use, store and throw away your medicines at www.disposemymeds.org.          This list is accurate as of 12/7/18  3:02 PM.  Always use your most recent med list.                   Brand Name Dispense Instructions for use Diagnosis    ALPRAZolam 0.5 MG tablet    XANAX    180 tablet    Take 1 tablet twice daily as needed.    Other specified anxiety disorders       MULTI-VITAMINS Tabs       Take 1 tablet by mouth daily        sildenafil 20 MG tablet    REVATIO    50 tablet    Take 1 tablet (20 mg) by mouth three times daily for pulmonary hypertension.  Never use with nitroglycerin, terazosin or doxazosin.    Vasculogenic erectile dysfunction, unspecified vasculogenic erectile dysfunction type       simvastatin 40 MG tablet    ZOCOR    90 tablet    Take 1 tablet (40 mg) by mouth daily (with dinner)    Hyperlipidemia LDL goal <100

## 2018-12-07 NOTE — PROGRESS NOTES
Chief Complaint:  Here for a refill on Xanax.    HPI: This patient has chronic anxiety.  He has been treated with Xanax that he takes once or twice daily.  He has been on this medication for 10 years at least with good results.  He is able to function normally on the Xanax.  His employer is aware that he is on this because he does have to take drug screens and that is not a problem for employment.  He has been on SSRIs in the past with side effects so he is not really interested in going that route.  He would like to get refills today.  He will be gone working in [place] until sometime probably in the spring time.    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     Colonic polyp      Erectile dysfunction      Hyperlipidemia      Kidney stones      Medullary cystic kidney     No Comments Provided     Personal history of nicotine dependence     No Comments Provided       Past Surgical History:   Procedure Laterality Date     APPENDECTOMY OPEN      No Comments Provided     COLONOSCOPY      1/09,follow-up recommended in 6 months     COLONOSCOPY      01/11/2010,F/U 2015     COLONOSCOPY      3/7/16,F/U 2021     URETHROPLASTY         No Known Allergies    Current Outpatient Prescriptions   Medication Sig Dispense Refill     ALPRAZolam (XANAX) 0.5 MG tablet Take 1 tablet twice daily as needed. 180 tablet 1     Multiple Vitamin (MULTI-VITAMINS) TABS Take 1 tablet by mouth daily       sildenafil (REVATIO) 20 MG tablet Take 1 tablet (20 mg) by mouth three times daily for pulmonary hypertension.  Never use with nitroglycerin, terazosin or doxazosin. 50 tablet 11     simvastatin (ZOCOR) 40 MG tablet Take 1 tablet (40 mg) by mouth daily (with dinner) 90 tablet 3       Review of Systems   Constitutional: Negative.    Eyes: Negative.    Endocrine: Negative.    Allergic/Immunologic: Negative.        Physical Exam   Constitutional: He appears well-developed and  well-nourished. No distress.   Skin: He is not diaphoretic.   Nursing note and vitals reviewed.      Assessment:        ICD-10-CM    1. Anxiety F41.9    2. Other specified anxiety disorders F41.8 ALPRAZolam (XANAX) 0.5 MG tablet       Plan: He will continue with Xanax twice daily as needed.  He was given 180 tablets to last 3 months with 1 refill and will need a follow-up at that time.

## 2018-12-08 ASSESSMENT — ANXIETY QUESTIONNAIRES: GAD7 TOTAL SCORE: 0

## 2019-06-13 ENCOUNTER — MYC REFILL (OUTPATIENT)
Dept: INTERNAL MEDICINE | Facility: OTHER | Age: 64
End: 2019-06-13

## 2019-06-13 DIAGNOSIS — F41.8 OTHER SPECIFIED ANXIETY DISORDERS: ICD-10-CM

## 2019-06-14 RX ORDER — ALPRAZOLAM 0.5 MG
TABLET ORAL
Qty: 180 TABLET | Refills: 1 | Status: SHIPPED | OUTPATIENT
Start: 2019-06-14 | End: 2019-12-09

## 2019-07-05 ENCOUNTER — OFFICE VISIT (OUTPATIENT)
Dept: FAMILY MEDICINE | Facility: OTHER | Age: 64
End: 2019-07-05
Attending: NURSE PRACTITIONER
Payer: COMMERCIAL

## 2019-07-05 VITALS
HEART RATE: 86 BPM | SYSTOLIC BLOOD PRESSURE: 117 MMHG | BODY MASS INDEX: 31.04 KG/M2 | HEIGHT: 74 IN | OXYGEN SATURATION: 96 % | TEMPERATURE: 98.2 F | WEIGHT: 241.9 LBS | DIASTOLIC BLOOD PRESSURE: 89 MMHG | RESPIRATION RATE: 16 BRPM

## 2019-07-05 DIAGNOSIS — J01.10 SUBACUTE FRONTAL SINUSITIS: Primary | ICD-10-CM

## 2019-07-05 DIAGNOSIS — R09.82 POST-NASAL DRAINAGE: ICD-10-CM

## 2019-07-05 DIAGNOSIS — J44.9 CHRONIC OBSTRUCTIVE PULMONARY DISEASE, UNSPECIFIED COPD TYPE (H): ICD-10-CM

## 2019-07-05 DIAGNOSIS — H61.23 BILATERAL IMPACTED CERUMEN: ICD-10-CM

## 2019-07-05 DIAGNOSIS — J34.89 SINUS PRESSURE: ICD-10-CM

## 2019-07-05 DIAGNOSIS — L29.9 LOCALIZED PRURITUS: ICD-10-CM

## 2019-07-05 PROCEDURE — 99214 OFFICE O/P EST MOD 30 MIN: CPT | Performed by: NURSE PRACTITIONER

## 2019-07-05 RX ORDER — TRIAMCINOLONE ACETONIDE 55 UG/1
1 SPRAY, METERED NASAL DAILY
Qty: 1 BOTTLE | Refills: 1 | Status: SHIPPED | OUTPATIENT
Start: 2019-07-05 | End: 2019-12-09

## 2019-07-05 ASSESSMENT — PAIN SCALES - GENERAL: PAINLEVEL: NO PAIN (0)

## 2019-07-05 ASSESSMENT — MIFFLIN-ST. JEOR: SCORE: 1967

## 2019-07-05 NOTE — NURSING NOTE
The canal was irrigated with body-temperature tap water with the jet of water directed superiorly.  The ear canal was then re-examined and cleared of the impaction.  The patient tolerated the procedure well.   Zoe Mckeon LPN .............7/5/2019  11:38 AM

## 2019-07-05 NOTE — NURSING NOTE
"Patient presents to clinic for derm problem times 1 month. States he has a rash on his forearms and sinus infection x 3 months. Has used cortisone for rash. States rash is moving upward and around his torso. No medications for sinuses, but did go in for sinuses last year.  Chief Complaint   Patient presents with     Derm Problem     sinus problem       Initial /89 (BP Location: Left arm, Patient Position: Sitting, Cuff Size: Adult Large)   Pulse 86   Temp 98.2  F (36.8  C) (Tympanic)   Resp 16   Ht 1.88 m (6' 2\")   Wt 109.7 kg (241 lb 14.4 oz)   SpO2 96%   BMI 31.06 kg/m   Estimated body mass index is 31.06 kg/m  as calculated from the following:    Height as of this encounter: 1.88 m (6' 2\").    Weight as of this encounter: 109.7 kg (241 lb 14.4 oz).  Medication Reconciliation: complete    Annmarie Farrar LPN    "

## 2019-07-05 NOTE — PROGRESS NOTES
HPI:    Garland Rice is a 64 year old male  who presents to clinic today for rash and sinus.    Rash on forearms for a month and spreading towards the elbows.  Rash is itchy.  Rash is not painful.  No open areas on arms.  No other areas of rash.  No known exposures or contacts.  States he works as a  and rests his arms on vinyl arm rests to get to hand controls so is unsure if this could be causing the itching but never had reaction or rash in the past.   No new soaps or products.  No new medications.  No one else with rash at home.   Tried OTC Hydrocortisone daily to BID with minimal relief.  No OTC allergy medication.      Sinus symptoms for the past 3 months.  Started with thick nasal drainage in thick globs for about a week then resolved and now with post nasal drainage with foul taste for the past 1 to 1.5 months.  Minor nasal dripping.  Sinus pressure and sinus headache in left forehead for the past 2 to 3 weeks.  Foggy feeling in head.  No dizziness with bending over or standing up.  Soul stomach from the post nasal drainage.  Intermittent occasional mild throat irritation from post nasal drainage but not throat pain.  No vomiting.  No fevers or chills.  Occasional smoker's cough. Daily tobacco use of 0.5 - 0.75 ppd.   Productive cough just in the mornings.  No chest tightness or heaviness.  Mildly winded with exertion, not winded or shortness of breath with walking.      Denies any home treatments.  Tried Ibuprofen and Aleve for sinus pressure without relief, states works for typical headaches but not these current sinus type headaches.        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     Colonic polyp      Erectile dysfunction      Hyperlipidemia      Kidney stones      Medullary cystic kidney     No Comments Provided     Personal history of nicotine dependence     No Comments Provided     Past Surgical History:  "  Procedure Laterality Date     APPENDECTOMY OPEN      No Comments Provided     COLONOSCOPY      1/09,follow-up recommended in 6 months     COLONOSCOPY  01/11/2010 01/11/2010,F/U 2015     COLONOSCOPY  03/07/2016    3/7/16,F/U 2021 tubular adenomas     URETHROPLASTY       Social History     Tobacco Use     Smoking status: Current Every Day Smoker     Packs/day: 0.50     Years: 40.00     Pack years: 20.00     Types: Cigarettes     Smokeless tobacco: Never Used   Substance Use Topics     Alcohol use: Yes     Comment: Alcoholic Drinks/day: 10 oz per week     Current Outpatient Medications   Medication Sig Dispense Refill     ALPRAZolam (XANAX) 0.5 MG tablet Take 1 tablet twice daily as needed. 180 tablet 1     Multiple Vitamin (MULTI-VITAMINS) TABS Take 1 tablet by mouth daily       sildenafil (REVATIO) 20 MG tablet Take 1 tablet (20 mg) by mouth three times daily for pulmonary hypertension.  Never use with nitroglycerin, terazosin or doxazosin. 50 tablet 11     simvastatin (ZOCOR) 40 MG tablet Take 1 tablet (40 mg) by mouth daily (with dinner) 90 tablet 3     No Known Allergies      Past medical history, past surgical history, current medications and allergies reviewed and accurate to the best of my knowledge.        ROS:  Refer to HPI    /89 (BP Location: Left arm, Patient Position: Sitting, Cuff Size: Adult Large)   Pulse 86   Temp 98.2  F (36.8  C) (Tympanic)   Resp 16   Ht 1.88 m (6' 2\")   Wt 109.7 kg (241 lb 14.4 oz)   SpO2 96%   BMI 31.06 kg/m      EXAM:  General Appearance: Well appearing adult male, appropriate appearance for age. No acute distress  Head: normocephalic, atraumatic  Ears: Left TM obscured by deep impacted hard wax, ear flush attempted by nurse, unable to remove due to patient intolerance.  Right TM obscured by deep impacted hard wax, ear flush attempted by nurse, unable to remove due to patient intolerance.  Left auditory canal without swelling or drainage.  Right auditory canal " without swelling or drainage.  Normal external ears, non tender.  Eyes: conjunctivae normal without erythema or irritation, no drainage or crusting, no eyelid swelling, pupils equal   Orophayrnx: moist mucous membranes, posterior pharynx with erythema and irritation, tonsils without hypertrophy, no erythema, no exudates or petechiae, post nasal drip seen, no trismus, voice clear.    Sinuses:  No sinus tenderness upon palpation of the frontal or maxillary sinuses  Nose:  Bilateral nares: minimal erythema, bilateral edema, no active drainage or congestion   Neck: supple without adenopathy  Respiratory: normal chest wall and respirations.  Normal effort.  Mild wheezes to auscultation bilaterally, no crackles or congestion.  No increased work of breathing.  No cough appreciated, oxygen saturation 96%  Cardiac: RRR with no murmurs  Musculoskeletal:  Normal gait.  Equal movement of bilateral upper extremities.  Equal movement of bilateral lower extremities.    Dermatological: Bilateral forearms with minimal under surface pale erythema, no surface involvement - no vesicles or excoriation   Psychological: normal affect, alert and pleasant          ASSESSMENT/PLAN:    1. Post-nasal drainage    - triamcinolone (NASACORT) 55 MCG/ACT nasal aerosol; Spray 1 spray into both nostrils daily  Dispense: 1 Bottle; Refill: 1    2. Sinus pressure    Symptomatic treatment - netti pot, saline nasal spray, humidifier, etc    3. Bilateral impacted cerumen    - HC REMOVAL IMPACTED CERUMEN IRRIGATION/LVG UNILAT    4. Subacute frontal sinusitis    - amoxicillin-clavulanate (AUGMENTIN) 875-125 MG tablet; Take 1 tablet by mouth 2 times daily for 10 days  Dispense: 20 tablet; Refill: 0    - triamcinolone (NASACORT) 55 MCG/ACT nasal aerosol; Spray 1 spray into both nostrils daily  Dispense: 1 Bottle; Refill: 1    Discussed warning signs/symptoms indicative of need to f/u    Follow up if symptoms persist or worsen or concerns    5. Localized  pruritus    Try baking soda paste to arms for 5 minutes then wash off    Try cool or cold wet wash clothe to arms for itching    Continue Hydrocortisone as needed to arms for itching    Discussed warning signs/symptoms indicative of need to f/u    Follow up if symptoms persist or worsen or concerns    6. Chronic obstructive pulmonary disease, unspecified COPD type (H)    Continues to smoke, not interested in tobacco cessation

## 2019-07-05 NOTE — PATIENT INSTRUCTIONS
Zyrtec daily for histamine (itching), allergy, sinus    Augmentin twice daily x 10 days - antibiotic    Nasacort spray daily at bedtime     Try baking soda paste to arms for 5 minutes then wash off    Try cool or cold wet wash clothe to arms for itching    Continue Hydrocortisone as needed to arms for itching    Follow up as needed

## 2019-07-12 ENCOUNTER — NURSE TRIAGE (OUTPATIENT)
Dept: FAMILY MEDICINE | Facility: OTHER | Age: 64
End: 2019-07-12

## 2019-07-12 NOTE — TELEPHONE ENCOUNTER
S-(situation):   Pt on day 7/10 of Augmentin and c/o new onset of abd cramping and diarrhea. Leaving on trip today. Wondering what he should do.     B-(background):   visit 7/5 by Kimberly Gillespie. Prescribed 10 days of Augmentin for subacute frontal sinusitis.    A-(assessment):  Pt took Augmentin for 6 days w/out issue. Pt took probiotic on days 1 and 2, felt ok; stopped taking. Yesterday, developed intermittent upper abd cramping; pain variable (3-4 to 6-7/10) and this AM had 3 diarrhea stools. Chills/sweating with onset of cramping; feels good otherwise. AM dose of abx and resumed probiotic today. He has 7 Augmentin tabs remaining.    Denies: vomiting, fever, weakness, blood in stool, signs of dehydration (intake: 60-70 oz water/day, last urinated 1 hr ago), exposure to ill person or spoiled food    R-(recommendations):   Home advice given per protocol (see documentation below). Pt is requesting message be sent to PCP, as he is leaving town at 11:00 AM and wondering if he should continue taking.    Tamar Stephenson RN .............. 7/12/2019  8:45 AM

## 2019-07-12 NOTE — TELEPHONE ENCOUNTER
Pt called stating they are having a reaction to amoxicillin.    Called and spoke to Patient after verifying last name and date of birth.    Additional Information    Negative: Shock suspected (e.g., cold/pale/clammy skin, too weak to stand, low BP, rapid pulse)    Negative: Difficult to awaken or acting confused (e.g., disoriented, slurred speech)    Negative: Sounds like a life-threatening emergency to the triager    Negative: Vomiting also present and worse than the diarrhea    Negative: Blood in stool and without diarrhea    Negative: SEVERE abdominal pain (e.g., excruciating) and present > 1 hour    Negative: SEVERE abdominal pain and age > 60    Negative: Bloody, black, or tarry bowel movements    Negative: SEVERE diarrhea (e.g., 7 or more times / day more than normal) and age > 60 years    Negative: Constant abdominal pain lasting > 2 hours    Negative: Drinking very little and has signs of dehydration (e.g., no urine > 12 hours, very dry mouth, very lightheaded)    Negative: Patient sounds very sick or weak to the triager    Negative: SEVERE diarrhea (e.g., 7 or more times / day more than normal) and present > 24 hours (1 day)    Negative: MODERATE diarrhea (e.g., 4-6 times / day more than normal) and present > 48 hours (2 days)    Negative: MODERATE diarrhea (e.g., 4-6 times / day more than normal) and age > 70 years    Negative: Abdominal pain  (Exception: pain clears completely with each passage of diarrhea stool)    Negative: Fever > 101 F (38.3 C)    Negative: Blood in the stool    Negative: Mucus or pus in stool has been present > 2 days and diarrhea is more than mild    Negative: Weak immune system (e.g., HIV positive, cancer chemo, splenectomy, organ transplant, chronic steroids)    Negative: Travel to a foreign country in past month    Negative: Recent antibiotic therapy (i.e., within last 2 months) and diarrhea present > 3 days since antibiotic was stopped    Negative: Recent hospitalization and  "diarrhea present > 3 days    Negative: Tube feedings (e.g., nasogastric, g-tube, j-tube)    Negative: Patient wants to be seen    Negative: Diarrhea is a chronic symptom (recurrent or ongoing AND lasting > 4 weeks)    Negative: SEVERE diarrhea (e.g., 7 or more times / day more than normal)    MILD-MODERATE diarrhea (e.g., 1-6 times / day more than normal)    MILD diarrhea and taking antibiotics    Commented on: MILD diarrhea (e.g., 1-3 or more stools than normal in past 24 hours) diarrhea without known cause and present > 7 days     Pt taking antibiotic without probiotic.    Answer Assessment - Initial Assessment Questions  Pt reports that he took Augmentin for 1 week, without issue. He took probiotic first 2 days, felt fine, so stopped taking. Yesterday, he developed intermittent upper abdominal cramping and today has had 3 diarrhea stools. He realized it may be due to not taking probiotic, so took again this morning. Last dose of Augmentin was taken this morning. Pt has 7 pills remaining.    1. DIARRHEA SEVERITY: \"How bad is the diarrhea?\" \"How many extra stools have you had in the past 24 hours than normal?\"   MILD: Few loose or mushy BMs; increase of 1-3 stools over normal daily number of stools    2. ONSET: \"When did the diarrhea begin?\"   This morning. Cramping started yesterday.    3. BM CONSISTENCY: \"How loose or watery is the diarrhea?\"   Loose and more watery with each episode.    4. VOMITING: \"Are you also vomiting?\" If so, ask: \"How many times in the past 24 hours?\"   Denies.    5. ABDOMINAL PAIN: \"Are you having any abdominal pain?\" If yes: \"What does it feel like?\" (e.g., crampy, dull, intermittent, constant)   Crampy.    6. ABDOMINAL PAIN SEVERITY: If present, ask: \"How bad is the pain?\"  (e.g., Scale 1-10; mild, moderate, or severe)  MILD to MODERATE: Varying from 3-4 up to 6-7/10, not interfering with normal activities at this time.    7. ORAL INTAKE: If vomiting, \"Have you been able to drink " "liquids?\" \"How much fluids have you had in the past 24 hours?\"      *No Answer*  8. HYDRATION: \"Any signs of dehydration?\" (e.g., dry mouth [not just dry lips], too weak to stand, dizziness, new weight loss) \"When did you last urinate?\"  Denies signs of dehydration. Drinking approx. 60-70 oz water daily. Last urinated 1 hour ago.    9. EXPOSURE: \"Have you traveled to a foreign country recently?\" \"Have you been exposed to anyone with diarrhea?\" \"Could you have eaten any food that was spoiled?\"  Denies.    10. ANTIBIOTIC USE: \"Are you taking antibiotics now or have you taken antibiotics in the past 2 months?\"  Per chart review, Pt was seen on 7/5, in  for rash and sinusitis. Pt given amoxicillin-clavulanate (AUGMENTIN) 875-125 MG tablet; Take 1 tablet by mouth 2 times daily for 10 days.    11. OTHER SYMPTOMS: \"Do you have any other symptoms?\"  Chills/sweating with onset of cramping; feels good otherwise.   Denies: fever, weakness, blood in stool    Pt is scheduled to leave [facility] at 11:00 AM and will be flying out today for a trip. Pt is wondering if the probiotic will kick in or if he should stop taking?    Protocols used: DIARRHEA-A-OH    Protocol recommends home care advice. Home care advice given per protocol. Pt urged to call 911 (if alone, with no ) or report to ED, if he feels if his skin feels cold/clammy or is pale, he is too weak to stand, he becomes disoriented, his abdominal pain becomes severe, he has blood in his stool, signs of dehydration or if he has 7 or more times/day more than normal BMs (diarrhea). The patient indicates understanding of these issues and agrees with the plan. Pt is requesting message be sent to PCP, as he is leaving [facility] at 11:00 AM and wondering if he should continue taking.    Tamar Stephenson RN .............. 7/12/2019  8:35 AM      "

## 2019-07-12 NOTE — TELEPHONE ENCOUNTER
After patient's name and date of birth verified the below information was given.  Patient confirmed understanding.     Jelly Comer ---- 7/12/2019 9:21 AM

## 2019-12-09 ENCOUNTER — OFFICE VISIT (OUTPATIENT)
Dept: INTERNAL MEDICINE | Facility: OTHER | Age: 64
End: 2019-12-09
Attending: INTERNAL MEDICINE
Payer: COMMERCIAL

## 2019-12-09 VITALS
WEIGHT: 242.8 LBS | SYSTOLIC BLOOD PRESSURE: 110 MMHG | TEMPERATURE: 96.3 F | HEART RATE: 88 BPM | BODY MASS INDEX: 31.16 KG/M2 | OXYGEN SATURATION: 95 % | DIASTOLIC BLOOD PRESSURE: 64 MMHG | RESPIRATION RATE: 16 BRPM | HEIGHT: 74 IN

## 2019-12-09 DIAGNOSIS — E78.5 HYPERLIPIDEMIA LDL GOAL <100: ICD-10-CM

## 2019-12-09 DIAGNOSIS — F41.9 ANXIETY: ICD-10-CM

## 2019-12-09 DIAGNOSIS — F52.8 PSYCHOSEXUAL DYSFUNCTION WITH INHIBITED SEXUAL EXCITEMENT: ICD-10-CM

## 2019-12-09 DIAGNOSIS — Z86.0101 H/O ADENOMATOUS POLYP OF COLON: ICD-10-CM

## 2019-12-09 DIAGNOSIS — F17.210 CIGARETTE SMOKER: ICD-10-CM

## 2019-12-09 DIAGNOSIS — Z87.891 PERSONAL HISTORY OF TOBACCO USE: ICD-10-CM

## 2019-12-09 DIAGNOSIS — J01.10 SUBACUTE FRONTAL SINUSITIS: ICD-10-CM

## 2019-12-09 DIAGNOSIS — L98.9 SKIN LESION: ICD-10-CM

## 2019-12-09 DIAGNOSIS — E78.5 HYPERLIPIDEMIA, UNSPECIFIED HYPERLIPIDEMIA TYPE: Primary | ICD-10-CM

## 2019-12-09 DIAGNOSIS — F41.8 OTHER SPECIFIED ANXIETY DISORDERS: ICD-10-CM

## 2019-12-09 LAB
ALBUMIN SERPL-MCNC: 4.6 G/DL (ref 3.5–5.7)
ALP SERPL-CCNC: 85 U/L (ref 34–104)
ALT SERPL W P-5'-P-CCNC: 33 U/L (ref 7–52)
ANION GAP SERPL CALCULATED.3IONS-SCNC: 5 MMOL/L (ref 3–14)
AST SERPL W P-5'-P-CCNC: 19 U/L (ref 13–39)
BILIRUB SERPL-MCNC: 0.4 MG/DL (ref 0.3–1)
BUN SERPL-MCNC: 21 MG/DL (ref 7–25)
CALCIUM SERPL-MCNC: 10 MG/DL (ref 8.6–10.3)
CHLORIDE SERPL-SCNC: 105 MMOL/L (ref 98–107)
CHOLEST SERPL-MCNC: 203 MG/DL
CO2 SERPL-SCNC: 30 MMOL/L (ref 21–31)
CREAT SERPL-MCNC: 1.07 MG/DL (ref 0.7–1.3)
GFR SERPL CREATININE-BSD FRML MDRD: 70 ML/MIN/{1.73_M2}
GLUCOSE SERPL-MCNC: 86 MG/DL (ref 70–105)
HDLC SERPL-MCNC: 41 MG/DL (ref 23–92)
LDLC SERPL CALC-MCNC: 107 MG/DL
NONHDLC SERPL-MCNC: 162 MG/DL
POTASSIUM SERPL-SCNC: 4.5 MMOL/L (ref 3.5–5.1)
PROT SERPL-MCNC: 7.2 G/DL (ref 6.4–8.9)
PSA SERPL-MCNC: 0.52 NG/ML
SODIUM SERPL-SCNC: 140 MMOL/L (ref 134–144)
TRIGL SERPL-MCNC: 274 MG/DL

## 2019-12-09 PROCEDURE — 17000 DESTRUCT PREMALG LESION: CPT | Performed by: INTERNAL MEDICINE

## 2019-12-09 PROCEDURE — G0296 VISIT TO DETERM LDCT ELIG: HCPCS | Performed by: INTERNAL MEDICINE

## 2019-12-09 PROCEDURE — 36415 COLL VENOUS BLD VENIPUNCTURE: CPT | Mod: ZL | Performed by: INTERNAL MEDICINE

## 2019-12-09 PROCEDURE — 80053 COMPREHEN METABOLIC PANEL: CPT | Mod: ZL | Performed by: INTERNAL MEDICINE

## 2019-12-09 PROCEDURE — 99215 OFFICE O/P EST HI 40 MIN: CPT | Mod: 25 | Performed by: INTERNAL MEDICINE

## 2019-12-09 PROCEDURE — 80061 LIPID PANEL: CPT | Mod: ZL | Performed by: INTERNAL MEDICINE

## 2019-12-09 PROCEDURE — 84153 ASSAY OF PSA TOTAL: CPT | Mod: ZL | Performed by: INTERNAL MEDICINE

## 2019-12-09 RX ORDER — BUSPIRONE HYDROCHLORIDE 15 MG/1
15 TABLET ORAL 3 TIMES DAILY
Qty: 270 TABLET | Refills: 3 | Status: SHIPPED | OUTPATIENT
Start: 2019-12-09 | End: 2020-06-30

## 2019-12-09 RX ORDER — ALPRAZOLAM 0.5 MG
TABLET ORAL
Qty: 180 TABLET | Refills: 1 | Status: SHIPPED | OUTPATIENT
Start: 2019-12-09 | End: 2020-06-11

## 2019-12-09 RX ORDER — SIMVASTATIN 40 MG
40 TABLET ORAL
Qty: 90 TABLET | Refills: 3 | Status: SHIPPED | OUTPATIENT
Start: 2019-12-09 | End: 2020-12-03

## 2019-12-09 SDOH — HEALTH STABILITY: MENTAL HEALTH: HOW MANY STANDARD DRINKS CONTAINING ALCOHOL DO YOU HAVE ON A TYPICAL DAY?: 1 OR 2

## 2019-12-09 SDOH — HEALTH STABILITY: MENTAL HEALTH: HOW OFTEN DO YOU HAVE A DRINK CONTAINING ALCOHOL?: 2-3 TIMES A WEEK

## 2019-12-09 ASSESSMENT — ENCOUNTER SYMPTOMS
ABDOMINAL DISTENTION: 0
HEMATURIA: 0
VOICE CHANGE: 0
DIFFICULTY URINATING: 0
SEIZURES: 0
SHORTNESS OF BREATH: 0
PALPITATIONS: 0
ADENOPATHY: 0
CONSTIPATION: 0
NECK STIFFNESS: 0
AGITATION: 0
BRUISES/BLEEDS EASILY: 0
FATIGUE: 0
NAUSEA: 0
HALLUCINATIONS: 0
VOMITING: 0
WHEEZING: 0
TREMORS: 0
APPETITE CHANGE: 0
WOUND: 0
COUGH: 0
BLOOD IN STOOL: 0
DYSURIA: 0
SORE THROAT: 0
NECK PAIN: 0
WEAKNESS: 0
CHEST TIGHTNESS: 0
NERVOUS/ANXIOUS: 0
TROUBLE SWALLOWING: 0
SINUS PAIN: 0
FLANK PAIN: 0
FREQUENCY: 0
SINUS PRESSURE: 0
CONFUSION: 0
DIZZINESS: 0
SLEEP DISTURBANCE: 0
LIGHT-HEADEDNESS: 0
HEADACHES: 0
DIAPHORESIS: 0
ABDOMINAL PAIN: 0
BACK PAIN: 0
JOINT SWELLING: 0
DIARRHEA: 0
CHILLS: 0
EYE REDNESS: 0
NUMBNESS: 0
RHINORRHEA: 0
UNEXPECTED WEIGHT CHANGE: 0
FEVER: 0
EYE PAIN: 0
SPEECH DIFFICULTY: 0

## 2019-12-09 ASSESSMENT — MIFFLIN-ST. JEOR: SCORE: 1971.08

## 2019-12-09 ASSESSMENT — PAIN SCALES - GENERAL: PAINLEVEL: NO PAIN (0)

## 2019-12-09 NOTE — PATIENT INSTRUCTIONS

## 2019-12-09 NOTE — LETTER
December 9, 2019      Garland Rice  [address]  [place] [address]        Dear Jorge L,    Below are the results of your recent labs:    Results for orders placed or performed in visit on 12/09/19   Lipid Panel     Status: Abnormal   Result Value Ref Range    Cholesterol 203 (H) <200 mg/dL    Triglycerides 274 (H) <150 mg/dL    HDL Cholesterol 41 23 - 92 mg/dL    LDL Cholesterol Calculated 107 (H) <100 mg/dL    Non HDL Cholesterol 162 (H) <130 mg/dL   Comprehensive Metabolic Panel     Status: None   Result Value Ref Range    Sodium 140 134 - 144 mmol/L    Potassium 4.5 3.5 - 5.1 mmol/L    Chloride 105 98 - 107 mmol/L    Carbon Dioxide 30 21 - 31 mmol/L    Anion Gap 5 3 - 14 mmol/L    Glucose 86 70 - 105 mg/dL    Urea Nitrogen 21 7 - 25 mg/dL    Creatinine 1.07 0.70 - 1.30 mg/dL    GFR Estimate 70 >60 mL/min/[1.73_m2]    GFR Estimate If Black 85 >60 mL/min/[1.73_m2]    Calcium 10.0 8.6 - 10.3 mg/dL    Bilirubin Total 0.4 0.3 - 1.0 mg/dL    Albumin 4.6 3.5 - 5.7 g/dL    Protein Total 7.2 6.4 - 8.9 g/dL    Alkaline Phosphatase 85 34 - 104 U/L    ALT 33 7 - 52 U/L    AST 19 13 - 39 U/L   Prostate Specific Antigen GH     Status: None   Result Value Ref Range    Prostate Specific Antigen 0.524 <3.100 ng/mL        Your cholesterol is a little bit high, everything else was normal.  Make sure that you take your cholesterol medication every day.    Sincerely,        Delta Falcon MD  Internal Medicine  [facility]

## 2019-12-09 NOTE — NURSING NOTE
Chief Complaint   Patient presents with     RECHECK     Patient presents to the clinic today for an annual visit  Medication Reconciliation: completed   Ana Cristina Ambriz LPN  12/9/2019 8:10 AM

## 2019-12-09 NOTE — PROGRESS NOTES
Chief Complaint:  This patient is here for a comprehensive review of their multiple medical problems, renewal of medications and update on necessary health maintenance issues.      HPI: This patient is here today for complete evaluation and a review of his chronic medical problems.  He has a history of hyperlipidemia.  He is on moderate intensity statin therapy that he tolerates without difficulty.  He is due for recheck on his lipids.  He does not have any known vascular disease.  Unfortunately, he does continue to smoke.  Strongly encouraged him to discontinue that habit.    1 of his biggest problems is with his anxiety.  He takes Xanax twice daily for this.  He is previously been intolerant of Effexor as well as intolerant of Zoloft.  He is wondering if there are other options for him to try other than relying on the Xanax on a regular basis.    He has a history of colonic polyps.  Next colonoscopy will be due in 2021.  He has a history of erectile dysfunction, he does use Viagra on an as-needed basis.  He has a mole on his right cheek that he would like checked, it seems to be getting larger and it is concerning for him.    Medications are reconciled.  Past medical history, past surgical history, family history and social histories are reviewed and updated.  Immunizations are reviewed with the patient.    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     Colonic polyp      Erectile dysfunction      Hyperlipidemia      Kidney stones      Medullary cystic kidney     No Comments Provided     Personal history of nicotine dependence     No Comments Provided       Past Surgical History:   Procedure Laterality Date     APPENDECTOMY OPEN      No Comments Provided     COLONOSCOPY      1/09,follow-up recommended in 6 months     COLONOSCOPY  01/11/2010 01/11/2010,F/U 2015     COLONOSCOPY  03/07/2016    3/7/16,F/U 2021 tubular adenomas     URETHROPLASTY          Current Outpatient Medications   Medication Sig Dispense Refill     ALPRAZolam (XANAX) 0.5 MG tablet Take 1 tablet twice daily as needed. 180 tablet 1     amoxicillin-clavulanate (AUGMENTIN) 875-125 MG tablet Take 1 tablet by mouth 2 times daily 20 tablet 0     busPIRone (BUSPAR) 15 MG tablet Take 1 tablet (15 mg) by mouth 3 times daily 270 tablet 3     Multiple Vitamin (MULTI-VITAMINS) TABS Take 1 tablet by mouth daily       sildenafil (REVATIO) 20 MG tablet Take 1 tablet (20 mg) by mouth three times daily for pulmonary hypertension.  Never use with nitroglycerin, terazosin or doxazosin. 50 tablet 11     simvastatin (ZOCOR) 40 MG tablet Take 1 tablet (40 mg) by mouth daily (with dinner) 90 tablet 3       No Known Allergies    Family History   Problem Relation Age of Onset     Emphysema Mother      Arthritis Father      Other - See Comments Other         No FHx DM or CAD, no cancers in first degree relatives.     Coronary Artery Disease Maternal Grandfather         Coronary artery disease,60- lived into 80s- smoked early     Other - See Comments Brother         Recovering addict     Coronary Artery Disease Brother 61     Other - See Comments Sister         Unknown     Colon Cancer No family hx of         Cancer-colon       Social History     Socioeconomic History     Marital status:      Spouse name: Not on file     Number of children: Not on file     Years of education: Not on file     Highest education level: Not on file   Occupational History     Not on file   Social Needs     Financial resource strain: Not on file     Food insecurity:     Worry: Not on file     Inability: Not on file     Transportation needs:     Medical: Not on file     Non-medical: Not on file   Tobacco Use     Smoking status: Current Every Day Smoker     Packs/day: 0.50     Years: 40.00     Pack years: 20.00     Types: Cigarettes     Smokeless tobacco: Never Used   Substance and Sexual Activity     Alcohol use: Yes     Frequency:  2-3 times a week     Drinks per session: 1 or 2     Comment: Alcoholic Drinks/day: 10 oz per week     Drug use: Never     Sexual activity: Yes     Partners: Female   Lifestyle     Physical activity:     Days per week: Not on file     Minutes per session: Not on file     Stress: Not on file   Relationships     Social connections:     Talks on phone: Not on file     Gets together: Not on file     Attends Zoroastrianism service: Not on file     Active member of club or organization: Not on file     Attends meetings of clubs or organizations: Not on file     Relationship status: Not on file     Intimate partner violence:     Fear of current or ex partner: Not on file     Emotionally abused: Not on file     Physically abused: Not on file     Forced sexual activity: Not on file   Other Topics Concern     Parent/sibling w/ CABG, MI or angioplasty before 65F 55M? Not Asked   Social History Narrative    Travels to work in [place] as a  for 100+ day stretches. Will be gone for 6+ months a year.  , wife works at [employer]. No financial or relationship concerns.       Review of Systems   Constitutional: Negative for appetite change, chills, diaphoresis, fatigue, fever and unexpected weight change.   HENT: Negative for ear pain, rhinorrhea, sinus pressure, sinus pain, sore throat, trouble swallowing and voice change.    Eyes: Negative for pain, redness and visual disturbance.   Respiratory: Negative for cough, chest tightness, shortness of breath and wheezing.    Cardiovascular: Negative for chest pain, palpitations and leg swelling.   Gastrointestinal: Negative for abdominal distention, abdominal pain, blood in stool, constipation, diarrhea, nausea and vomiting.   Endocrine: Negative for cold intolerance and heat intolerance.   Genitourinary: Negative for difficulty urinating, dysuria, flank pain, frequency and hematuria.   Musculoskeletal: Negative for back pain, joint swelling, neck pain and neck stiffness.   Skin:  Negative for rash and wound.   Allergic/Immunologic: Negative for immunocompromised state.   Neurological: Negative for dizziness, tremors, seizures, syncope, speech difficulty, weakness, light-headedness, numbness and headaches.   Hematological: Negative for adenopathy. Does not bruise/bleed easily.   Psychiatric/Behavioral: Negative for agitation, behavioral problems, confusion, hallucinations and sleep disturbance. The patient is not nervous/anxious.        Physical Exam  Vitals signs and nursing note reviewed.   Constitutional:       General: He is not in acute distress.     Appearance: He is well-developed. He is not diaphoretic.   HENT:      Head: Normocephalic.        Right Ear: Tympanic membrane, ear canal and external ear normal.      Left Ear: Tympanic membrane, ear canal and external ear normal.      Nose: Nose normal.      Mouth/Throat:      Pharynx: No oropharyngeal exudate.   Eyes:      Conjunctiva/sclera: Conjunctivae normal.      Pupils: Pupils are equal, round, and reactive to light.   Neck:      Musculoskeletal: Normal range of motion and neck supple.      Thyroid: No thyroid mass or thyromegaly.      Vascular: Normal carotid pulses. No carotid bruit or JVD.      Trachea: No tracheal deviation.   Cardiovascular:      Rate and Rhythm: Normal rate and regular rhythm.      Heart sounds: Normal heart sounds. No murmur. No friction rub. No gallop.    Pulmonary:      Effort: Pulmonary effort is normal. No respiratory distress.      Breath sounds: No stridor. Decreased breath sounds and rhonchi present. No wheezing or rales.   Abdominal:      General: Bowel sounds are normal. There is no distension.      Palpations: Abdomen is soft. There is no mass.      Tenderness: There is no abdominal tenderness. There is no guarding or rebound.      Hernia: No hernia is present.   Genitourinary:     Penis: Normal.       Scrotum/Testes: Normal.      Prostate: Normal.      Rectum: Normal.   Musculoskeletal: Normal  range of motion.      Right lower leg: No edema.      Left lower leg: No edema.   Lymphadenopathy:      Cervical: No cervical adenopathy.   Skin:     General: Skin is warm and dry.      Findings: No rash.   Neurological:      Mental Status: He is alert and oriented to person, place, and time.      Cranial Nerves: No cranial nerve deficit.      Sensory: No sensory deficit.      Motor: No abnormal muscle tone.      Coordination: Coordination normal.      Deep Tendon Reflexes: Reflexes normal.         Assessment:      ICD-10-CM    1. Hyperlipidemia, unspecified hyperlipidemia type E78.5 Comprehensive Metabolic Panel     Lipid Panel   2. Anxiety F41.9 busPIRone (BUSPAR) 15 MG tablet   3. Cigarette smoker F17.210    4. H/O adenomatous polyp of colon Z86.010    5. 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 Personal history of tobacco use Z87.891 Prof fee: Shared Decisionmaking for Lung Cancer Screening     CT Chest Lung Cancer Scrn Low Dose wo     Okay for Smoking Cessation Study (PLUTO) to Contact Patient   7. Other specified anxiety disorders F41.8 ALPRAZolam (XANAX) 0.5 MG tablet   8. Hyperlipidemia LDL goal <100 E78.5 simvastatin (ZOCOR) 40 MG tablet   9. Subacute frontal sinusitis J01.10 amoxicillin-clavulanate (AUGMENTIN) 875-125 MG tablet        Plan: Overall the patient appears to be doing well.  He will follow-up on the skin lesion as needed.  Long discussion with him regarding his smoking.  I recommended that he discontinue smoking.  I also recommended doing a CT scan for screening reasons, he is interested so this is ordered and I will let him know the results.  In regards to his anxiety, I elected to try him on BuSpar 15 mg up to 3 times daily.  I want him to start on 1/2 tablet 2-3 times daily to begin with and titrate the dose as needed.  Start using the Xanax more on an as-needed basis rather than twice daily regularly.  Other medications will continue  without change.  Complete lab drawn and pending, I will send him a letter with the results.  Of note is that he has not been completely compliant with his simvastatin and I encouraged him to improve his compliance with that.  Follow-up will be annually, sooner if there are problems.  Of note is that I did give him a refill on his Augmentin, he tends to get sinus infections when he is in [place] and I think it is a good idea for him to have this on hand in case this recurs.        Lung Cancer Screening Shared Decision Making Visit     Garland Rice is eligible for lung cancer screening on the basis of the information provided in my signed lung cancer screening order.     I have discussed with patient the risks and benefits of screening for lung cancer with low-dose CT.     The risks include:  radiation exposure: one low dose chest CT has as much ionizing radiation as about 15 chest x-rays or 6 months of background radiation living in [place]    false positives: 96% of positive findings/nodules are NOT cancer, but some might still require additional diagnostic evaluation, including biopsy  over-diagnosis: some slow growing cancers that might never have been clinically significant will be detected and treated unnecessarily     The benefit of early detection of lung cancer is contingent upon adherence to annual screening or more frequent follow up if indicated.     Furthermore, reaping the benefits of screening requires Garland Rice to be willing and physically able to undergo diagnostic procedures, if indicated. Although no specific guide is available for determining severity of comorbidities, it is reasonable to withhold screening in patients who have greater mortality risk from other diseases.     We did discuss that the only way to prevent lung cancer is to not smoke. Smoking cessation assistance was offered.    I did not offer risk estimation using a calculator such as this one:    ShouldIScreen

## 2019-12-27 ENCOUNTER — HOSPITAL ENCOUNTER (OUTPATIENT)
Dept: CT IMAGING | Facility: OTHER | Age: 64
Discharge: HOME | End: 2019-12-27
Attending: INTERNAL MEDICINE | Admitting: INTERNAL MEDICINE
Payer: COMMERCIAL

## 2019-12-27 DIAGNOSIS — Z87.891 PERSONAL HISTORY OF TOBACCO USE: ICD-10-CM

## 2019-12-27 PROCEDURE — G0297 LDCT FOR LUNG CA SCREEN: HCPCS

## 2020-06-11 ENCOUNTER — MYC REFILL (OUTPATIENT)
Dept: INTERNAL MEDICINE | Facility: OTHER | Age: 65
End: 2020-06-11

## 2020-06-11 DIAGNOSIS — F41.8 OTHER SPECIFIED ANXIETY DISORDERS: ICD-10-CM

## 2020-06-12 RX ORDER — ALPRAZOLAM 0.5 MG
TABLET ORAL
Qty: 180 TABLET | Refills: 1 | Status: SHIPPED | OUTPATIENT
Start: 2020-06-12 | End: 2020-12-03

## 2020-06-30 ENCOUNTER — MYC MEDICAL ADVICE (OUTPATIENT)
Dept: INTERNAL MEDICINE | Facility: OTHER | Age: 65
End: 2020-06-30

## 2020-06-30 NOTE — TELEPHONE ENCOUNTER
Notify the patient that he is not in the highest risk category for COVID-19.  I can certainly understand if he decides that he does not want to work at a specific location but that is a personal judgment and not something that I can provide an unemployment letter for.  There are many people at higher risk than he is that continue to work.

## 2020-12-03 ENCOUNTER — OFFICE VISIT (OUTPATIENT)
Dept: INTERNAL MEDICINE | Facility: OTHER | Age: 65
End: 2020-12-03
Attending: INTERNAL MEDICINE
Payer: COMMERCIAL

## 2020-12-03 VITALS
WEIGHT: 278 LBS | DIASTOLIC BLOOD PRESSURE: 90 MMHG | SYSTOLIC BLOOD PRESSURE: 136 MMHG | TEMPERATURE: 96.6 F | RESPIRATION RATE: 20 BRPM | HEIGHT: 75 IN | BODY MASS INDEX: 34.57 KG/M2 | HEART RATE: 76 BPM

## 2020-12-03 DIAGNOSIS — R91.8 PULMONARY NODULES: ICD-10-CM

## 2020-12-03 DIAGNOSIS — E78.5 HYPERLIPIDEMIA, UNSPECIFIED HYPERLIPIDEMIA TYPE: Primary | ICD-10-CM

## 2020-12-03 DIAGNOSIS — F41.9 ANXIETY: ICD-10-CM

## 2020-12-03 DIAGNOSIS — Z86.0101 H/O ADENOMATOUS POLYP OF COLON: ICD-10-CM

## 2020-12-03 DIAGNOSIS — Z87.891 PERSONAL HISTORY OF TOBACCO USE: ICD-10-CM

## 2020-12-03 DIAGNOSIS — Z87.891 HISTORY OF SMOKING: ICD-10-CM

## 2020-12-03 DIAGNOSIS — F41.8 OTHER SPECIFIED ANXIETY DISORDERS: ICD-10-CM

## 2020-12-03 PROBLEM — F17.210 CIGARETTE SMOKER: Status: RESOLVED | Noted: 2018-10-17 | Resolved: 2020-12-03

## 2020-12-03 PROCEDURE — G0296 VISIT TO DETERM LDCT ELIG: HCPCS | Mod: XU | Performed by: INTERNAL MEDICINE

## 2020-12-03 PROCEDURE — 90471 IMMUNIZATION ADMIN: CPT | Performed by: INTERNAL MEDICINE

## 2020-12-03 PROCEDURE — 99396 PREV VISIT EST AGE 40-64: CPT | Mod: 25 | Performed by: INTERNAL MEDICINE

## 2020-12-03 PROCEDURE — 90686 IIV4 VACC NO PRSV 0.5 ML IM: CPT | Performed by: INTERNAL MEDICINE

## 2020-12-03 PROCEDURE — 90472 IMMUNIZATION ADMIN EACH ADD: CPT | Performed by: INTERNAL MEDICINE

## 2020-12-03 PROCEDURE — 90732 PPSV23 VACC 2 YRS+ SUBQ/IM: CPT | Performed by: INTERNAL MEDICINE

## 2020-12-03 RX ORDER — ALPRAZOLAM 0.5 MG
TABLET ORAL
Qty: 180 TABLET | Refills: 1 | Status: SHIPPED | OUTPATIENT
Start: 2020-12-03 | End: 2020-12-11

## 2020-12-03 RX ORDER — ROSUVASTATIN CALCIUM 5 MG/1
5 TABLET, COATED ORAL DAILY
Qty: 90 TABLET | Refills: 3 | Status: SHIPPED | OUTPATIENT
Start: 2020-12-03 | End: 2021-12-09

## 2020-12-03 ASSESSMENT — ENCOUNTER SYMPTOMS
FATIGUE: 0
WHEEZING: 0
ADENOPATHY: 0
TROUBLE SWALLOWING: 0
NAUSEA: 0
UNEXPECTED WEIGHT CHANGE: 1
NERVOUS/ANXIOUS: 0
SLEEP DISTURBANCE: 0
VOICE CHANGE: 0
BLOOD IN STOOL: 0
DIZZINESS: 0
CHILLS: 0
HEMATURIA: 0
COUGH: 0
DIAPHORESIS: 0
EYE PAIN: 0
SORE THROAT: 0
SEIZURES: 0
SINUS PAIN: 0
CHEST TIGHTNESS: 0
BACK PAIN: 0
CONSTIPATION: 0
NECK PAIN: 0
ABDOMINAL PAIN: 0
JOINT SWELLING: 0
SINUS PRESSURE: 0
NUMBNESS: 0
BRUISES/BLEEDS EASILY: 0
AGITATION: 0
APPETITE CHANGE: 0
WEAKNESS: 0
EYE REDNESS: 0
PALPITATIONS: 0
DYSURIA: 0
SHORTNESS OF BREATH: 0
DIARRHEA: 0
NECK STIFFNESS: 0
LIGHT-HEADEDNESS: 0
CONFUSION: 0
HALLUCINATIONS: 0
SPEECH DIFFICULTY: 0
ABDOMINAL DISTENTION: 0
RHINORRHEA: 0
FREQUENCY: 0
FLANK PAIN: 0
DIFFICULTY URINATING: 0
TREMORS: 0
HEADACHES: 0
VOMITING: 0
WOUND: 0
FEVER: 0

## 2020-12-03 ASSESSMENT — MIFFLIN-ST. JEOR: SCORE: 2133.69

## 2020-12-03 ASSESSMENT — PAIN SCALES - GENERAL: PAINLEVEL: NO PAIN (0)

## 2020-12-03 NOTE — LETTER
December 3, 2020        Garland Rice  [address] [address]        To whom it may concern:    Please be advised that this patient is under my care.  I have recommended to the patient that he not work in any situation that includes communal living or other potentially highly contagious or unsafe settings in light of the coronavirus pandemic.  His medical history places him at a higher risk for illness and adverse outcome should he ever contract the coronavirus.    Sincerely,        Delta Falcon MD  Internal Medicine  [facility]

## 2020-12-03 NOTE — NURSING NOTE
"Chief Complaint   Patient presents with     Physical       Initial BP (!) 136/90 (BP Location: Right arm, Patient Position: Sitting, Cuff Size: Adult Large)   Pulse 76   Temp 96.6  F (35.9  C) (Tympanic)   Resp 20   Ht 1.892 m (6' 2.5\")   Wt 126.1 kg (278 lb)   BMI 35.22 kg/m   Estimated body mass index is 35.22 kg/m  as calculated from the following:    Height as of this encounter: 1.892 m (6' 2.5\").    Weight as of this encounter: 126.1 kg (278 lb).  Medication Reconciliation: complete    Jaki Johns LPN  "

## 2020-12-03 NOTE — PATIENT INSTRUCTIONS

## 2020-12-03 NOTE — PROGRESS NOTES
Chief Complaint:  This patient is here for a comprehensive review of their multiple medical problems, renewal of medications and update on necessary health maintenance issues.      HPI: He is in today for yearly review.  He has a history of anxiety.  He has been tried on numerous SSRIs and last year we tried him on BuSpar.  He did not tolerate that either.  He is currently on Xanax twice daily which seems to be working and is effective so I recommended that we just continue with that for the time being.    He has hyperlipidemia but did not tolerate the simvastatin.  He wonders if there are other options.  He has a history of colonic polyps, colonoscopy will be due next year.  Last year we did a CT of his lungs and he has some pulmonary nodules, he is due for recheck on that this year.    He would like a letter for work so that he would not be exposed to Covid 19 infection.  We talked about immunizations and he would like to get a flu shot and a Pneumovax.  Other than that he really has no major complaints or concerns.    Since last year, the patient did quit smoking.  He was congratulated on that accomplishment.  Unfortunately, he has put on a bunch of weight.  He will be working on reducing his weight.    Medications are reconciled.  Past medical history, past surgical history, family history and social histories are reviewed and updated.    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     Colonic polyp      Erectile dysfunction      Hyperlipidemia      Kidney stones      Medullary cystic kidney     No Comments Provided     Personal history of nicotine dependence     No Comments Provided       Past Surgical History:   Procedure Laterality Date     APPENDECTOMY OPEN      No Comments Provided     COLONOSCOPY      1/09,follow-up recommended in 6 months     COLONOSCOPY  01/11/2010 01/11/2010,F/U 2015     COLONOSCOPY  03/07/2016    3/7/16,F/U 2021  tubular adenomas     URETHROPLASTY         Current Outpatient Medications   Medication Sig Dispense Refill     ALPRAZolam (XANAX) 0.5 MG tablet Take 1 tablet twice daily as needed. 180 tablet 1     amoxicillin-clavulanate (AUGMENTIN) 875-125 MG tablet Take 1 tablet by mouth 2 times daily 20 tablet 0     Multiple Vitamin (MULTI-VITAMINS) TABS Take 1 tablet by mouth daily       rosuvastatin (CRESTOR) 5 MG tablet Take 1 tablet (5 mg) by mouth daily 90 tablet 3       No Known Allergies    Family History   Problem Relation Age of Onset     Emphysema Mother      Arthritis Father      Other - See Comments Other         No FHx DM or CAD, no cancers in first degree relatives.     Coronary Artery Disease Maternal Grandfather         Coronary artery disease,60- lived into 80s- smoked early     Other - See Comments Brother         Recovering addict     Coronary Artery Disease Brother 61     Colon Cancer Brother      Other - See Comments Sister         Unknown       Social History     Socioeconomic History     Marital status:      Spouse name: Not on file     Number of children: Not on file     Years of education: Not on file     Highest education level: Not on file   Occupational History     Not on file   Social Needs     Financial resource strain: Not on file     Food insecurity     Worry: Not on file     Inability: Not on file     Transportation needs     Medical: Not on file     Non-medical: Not on file   Tobacco Use     Smoking status: Former Smoker     Packs/day: 0.50     Years: 40.00     Pack years: 20.00     Types: Cigarettes     Quit date: 1/3/2020     Years since quittin.9     Smokeless tobacco: Never Used   Substance and Sexual Activity     Alcohol use: Yes     Frequency: 2-3 times a week     Drinks per session: 1 or 2     Comment: 2-3 times per week     Drug use: Never     Sexual activity: Yes     Partners: Female   Lifestyle     Physical activity     Days per week: Not on file     Minutes per session: Not  on file     Stress: Not on file   Relationships     Social connections     Talks on phone: Not on file     Gets together: Not on file     Attends Catholic service: Not on file     Active member of club or organization: Not on file     Attends meetings of clubs or organizations: Not on file     Relationship status: Not on file     Intimate partner violence     Fear of current or ex partner: Not on file     Emotionally abused: Not on file     Physically abused: Not on file     Forced sexual activity: Not on file   Other Topics Concern     Parent/sibling w/ CABG, MI or angioplasty before 65F 55M? Not Asked   Social History Narrative    Travels to work in [place] as a  for 100+ day stretches. Will be gone for 6+ months a year.  , wife works at [employer]. No financial or relationship concerns.       Review of Systems   Constitutional: Positive for unexpected weight change. Negative for appetite change, chills, diaphoresis, fatigue and fever.   HENT: Negative for ear pain, rhinorrhea, sinus pressure, sinus pain, sore throat, trouble swallowing and voice change.    Eyes: Negative for pain, redness and visual disturbance.   Respiratory: Negative for cough, chest tightness, shortness of breath and wheezing.    Cardiovascular: Negative for chest pain, palpitations and leg swelling.   Gastrointestinal: Negative for abdominal distention, abdominal pain, blood in stool, constipation, diarrhea, nausea and vomiting.   Endocrine: Negative for cold intolerance and heat intolerance.   Genitourinary: Negative for difficulty urinating, dysuria, flank pain, frequency and hematuria.   Musculoskeletal: Negative for back pain, joint swelling, neck pain and neck stiffness.   Skin: Negative for rash and wound.   Allergic/Immunologic: Negative for immunocompromised state.   Neurological: Negative for dizziness, tremors, seizures, syncope, speech difficulty, weakness, light-headedness, numbness and headaches.   Hematological:  Negative for adenopathy. Does not bruise/bleed easily.   Psychiatric/Behavioral: Negative for agitation, behavioral problems, confusion, hallucinations and sleep disturbance. The patient is not nervous/anxious.        Physical Exam  Vitals signs and nursing note reviewed.   Constitutional:       General: He is not in acute distress.     Appearance: He is well-developed. He is not diaphoretic.   HENT:      Head: Normocephalic.      Right Ear: Tympanic membrane, ear canal and external ear normal.      Left Ear: Tympanic membrane, ear canal and external ear normal.      Nose: Nose normal.      Mouth/Throat:      Pharynx: No oropharyngeal exudate.   Eyes:      Conjunctiva/sclera: Conjunctivae normal.      Pupils: Pupils are equal, round, and reactive to light.   Neck:      Musculoskeletal: Normal range of motion and neck supple.      Thyroid: No thyroid mass or thyromegaly.      Vascular: Normal carotid pulses. No carotid bruit or JVD.      Trachea: No tracheal deviation.   Cardiovascular:      Rate and Rhythm: Normal rate and regular rhythm.      Heart sounds: Normal heart sounds. No murmur. No friction rub. No gallop.    Pulmonary:      Effort: Pulmonary effort is normal. No respiratory distress.      Breath sounds: Normal breath sounds. No stridor. No decreased breath sounds, wheezing, rhonchi or rales.   Abdominal:      General: Bowel sounds are normal. There is no distension.      Palpations: Abdomen is soft. There is no mass.      Tenderness: There is no abdominal tenderness. There is no guarding or rebound.      Hernia: No hernia is present.   Genitourinary:     Penis: Normal.       Testes: Normal.      Rectum: Normal.      Comments: High riding prostate  Musculoskeletal: Normal range of motion.      Right lower leg: No edema.      Left lower leg: No edema.   Lymphadenopathy:      Cervical: No cervical adenopathy.   Skin:     General: Skin is warm and dry.      Findings: No rash.   Neurological:      Mental  Status: He is alert and oriented to person, place, and time.      Cranial Nerves: No cranial nerve deficit.      Sensory: No sensory deficit.      Motor: No abnormal muscle tone.      Coordination: Coordination normal.      Deep Tendon Reflexes: Reflexes normal.         Assessment:      ICD-10-CM    1. Hyperlipidemia, unspecified hyperlipidemia type  E78.5 Comprehensive metabolic panel     Lipid Profile     Hepatitis C antibody     rosuvastatin (CRESTOR) 5 MG tablet   2. H/O adenomatous polyp of colon  Z86.010    3. Anxiety  F41.9    4. History of smoking  Z87.891    5. Pulmonary nodules  R91.8    6. Other specified anxiety disorders  F41.8 ALPRAZolam (XANAX) 0.5 MG tablet        Plan: Overall he appears to be doing well.  Medications were refilled and I elected to try him on Crestor 5 mg daily to see if he tolerates that.  He will presumably have fasting lab in approximately 1 month and I will message him the results.  CT scan of the chest to follow-up on the pulmonary nodules.  Colonoscopy is requested.  Flu shot and Pneumovax given today.  Congratulated on discontinuance of smoking.  Encouraged him to work on weight loss.        Lung Cancer Screening Shared Decision Making Visit     Garland Rice is eligible for lung cancer screening on the basis of the information provided in my signed lung cancer screening order.     I have discussed with patient the risks and benefits of screening for lung cancer with low-dose CT.     The risks include:  radiation exposure: one low dose chest CT has as much ionizing radiation as about 15 chest x-rays or 6 months of background radiation living in [place]    false positives: 96% of positive findings/nodules are NOT cancer, but some might still require additional diagnostic evaluation, including biopsy  over-diagnosis: some slow growing cancers that might never have been clinically significant will be detected and treated unnecessarily     The benefit of early detection  of lung cancer is contingent upon adherence to annual screening or more frequent follow up if indicated.     Furthermore, reaping the benefits of screening requires Garland Rice to be willing and physically able to undergo diagnostic procedures, if indicated. Although no specific guide is available for determining severity of comorbidities, it is reasonable to withhold screening in patients who have greater mortality risk from other diseases.     We did discuss that the only way to prevent lung cancer is to not smoke. Smoking cessation counseling was not given.      I did not offer risk estimation using a calculator such as this one:    ShouldIScreen

## 2020-12-11 ENCOUNTER — MYC REFILL (OUTPATIENT)
Dept: INTERNAL MEDICINE | Facility: OTHER | Age: 65
End: 2020-12-11

## 2020-12-11 DIAGNOSIS — F41.8 OTHER SPECIFIED ANXIETY DISORDERS: ICD-10-CM

## 2020-12-11 RX ORDER — ALPRAZOLAM 0.5 MG
TABLET ORAL
Qty: 180 TABLET | Refills: 1 | Status: SHIPPED | OUTPATIENT
Start: 2020-12-11 | End: 2021-06-09

## 2020-12-28 ENCOUNTER — HOSPITAL ENCOUNTER (OUTPATIENT)
Dept: CT IMAGING | Facility: OTHER | Age: 65
Discharge: HOME | End: 2020-12-28
Attending: INTERNAL MEDICINE | Admitting: INTERNAL MEDICINE
Payer: COMMERCIAL

## 2020-12-28 ENCOUNTER — MYC MEDICAL ADVICE (OUTPATIENT)
Dept: INTERNAL MEDICINE | Facility: OTHER | Age: 65
End: 2020-12-28

## 2020-12-28 DIAGNOSIS — Z87.891 PERSONAL HISTORY OF TOBACCO USE: ICD-10-CM

## 2020-12-28 PROCEDURE — G0297 LDCT FOR LUNG CA SCREEN: HCPCS

## 2021-02-08 ENCOUNTER — TRANSFERRED RECORDS (OUTPATIENT)
Dept: HEALTH INFORMATION MANAGEMENT | Facility: OTHER | Age: 66
End: 2021-02-08

## 2021-02-17 ENCOUNTER — MEDICAL CORRESPONDENCE (OUTPATIENT)
Dept: HEALTH INFORMATION MANAGEMENT | Facility: OTHER | Age: 66
End: 2021-02-17

## 2021-02-22 ENCOUNTER — OFFICE VISIT (OUTPATIENT)
Dept: INTERNAL MEDICINE | Facility: OTHER | Age: 66
End: 2021-02-22
Attending: INTERNAL MEDICINE
Payer: COMMERCIAL

## 2021-02-22 VITALS
RESPIRATION RATE: 18 BRPM | DIASTOLIC BLOOD PRESSURE: 80 MMHG | SYSTOLIC BLOOD PRESSURE: 138 MMHG | TEMPERATURE: 96.8 F | HEART RATE: 92 BPM | BODY MASS INDEX: 33.25 KG/M2 | OXYGEN SATURATION: 98 % | WEIGHT: 267.4 LBS | HEIGHT: 75 IN

## 2021-02-22 DIAGNOSIS — Z86.0101 H/O ADENOMATOUS POLYP OF COLON: Primary | ICD-10-CM

## 2021-02-22 DIAGNOSIS — E78.5 HYPERLIPIDEMIA, UNSPECIFIED HYPERLIPIDEMIA TYPE: ICD-10-CM

## 2021-02-22 DIAGNOSIS — Z87.442 HISTORY OF KIDNEY STONES: Primary | ICD-10-CM

## 2021-02-22 LAB
ALBUMIN SERPL-MCNC: 4.6 G/DL (ref 3.5–5.7)
ALBUMIN UR-MCNC: 10 MG/DL
ALP SERPL-CCNC: 76 U/L (ref 34–104)
ALT SERPL W P-5'-P-CCNC: 57 U/L (ref 7–52)
ANION GAP SERPL CALCULATED.3IONS-SCNC: 9 MMOL/L (ref 3–14)
APPEARANCE UR: CLEAR
AST SERPL W P-5'-P-CCNC: 31 U/L (ref 13–39)
BILIRUB SERPL-MCNC: 0.6 MG/DL (ref 0.3–1)
BILIRUB UR QL STRIP: NEGATIVE
BUN SERPL-MCNC: 24 MG/DL (ref 7–25)
CALCIUM SERPL-MCNC: 10 MG/DL (ref 8.6–10.3)
CHLORIDE SERPL-SCNC: 106 MMOL/L (ref 98–107)
CHOLEST SERPL-MCNC: 173 MG/DL
CO2 SERPL-SCNC: 24 MMOL/L (ref 21–31)
COLOR UR AUTO: YELLOW
CREAT SERPL-MCNC: 1.02 MG/DL (ref 0.7–1.3)
GFR SERPL CREATININE-BSD FRML MDRD: 74 ML/MIN/{1.73_M2}
GLUCOSE SERPL-MCNC: 126 MG/DL (ref 70–105)
GLUCOSE UR STRIP-MCNC: NEGATIVE MG/DL
HDLC SERPL-MCNC: 42 MG/DL (ref 23–92)
HGB UR QL STRIP: ABNORMAL
HYALINE CASTS #/AREA URNS LPF: 1 /LPF (ref 0–2)
KETONES UR STRIP-MCNC: NEGATIVE MG/DL
LDLC SERPL CALC-MCNC: 102 MG/DL
LEUKOCYTE ESTERASE UR QL STRIP: NEGATIVE
MUCOUS THREADS #/AREA URNS LPF: PRESENT /LPF
NITRATE UR QL: NEGATIVE
NONHDLC SERPL-MCNC: 131 MG/DL
PH UR STRIP: 5 PH (ref 5–7)
POTASSIUM SERPL-SCNC: 4.1 MMOL/L (ref 3.5–5.1)
PROT SERPL-MCNC: 8 G/DL (ref 6.4–8.9)
RBC #/AREA URNS AUTO: 3 /HPF (ref 0–2)
SODIUM SERPL-SCNC: 139 MMOL/L (ref 134–144)
SOURCE: ABNORMAL
SP GR UR STRIP: 1.03 (ref 1–1.03)
TRIGL SERPL-MCNC: 145 MG/DL
UROBILINOGEN UR STRIP-MCNC: NORMAL MG/DL (ref 0–2)
WBC #/AREA URNS AUTO: 3 /HPF (ref 0–5)

## 2021-02-22 PROCEDURE — 80061 LIPID PANEL: CPT | Mod: ZL | Performed by: INTERNAL MEDICINE

## 2021-02-22 PROCEDURE — 36415 COLL VENOUS BLD VENIPUNCTURE: CPT | Mod: ZL | Performed by: INTERNAL MEDICINE

## 2021-02-22 PROCEDURE — 81001 URINALYSIS AUTO W/SCOPE: CPT | Mod: ZL | Performed by: INTERNAL MEDICINE

## 2021-02-22 PROCEDURE — 86803 HEPATITIS C AB TEST: CPT | Mod: ZL | Performed by: INTERNAL MEDICINE

## 2021-02-22 PROCEDURE — 99213 OFFICE O/P EST LOW 20 MIN: CPT | Performed by: INTERNAL MEDICINE

## 2021-02-22 PROCEDURE — 80053 COMPREHEN METABOLIC PANEL: CPT | Mod: ZL | Performed by: INTERNAL MEDICINE

## 2021-02-22 RX ORDER — POLYETHYLENE GLYCOL 3350 17 G/17G
POWDER, FOR SOLUTION ORAL
Qty: 510 G | Refills: 0 | Status: SHIPPED | OUTPATIENT
Start: 2021-02-22 | End: 2022-01-18

## 2021-02-22 RX ORDER — BISACODYL 5 MG/1
TABLET, DELAYED RELEASE ORAL
Qty: 2 TABLET | Refills: 0 | Status: SHIPPED | OUTPATIENT
Start: 2021-02-22 | End: 2022-01-18

## 2021-02-22 ASSESSMENT — PAIN SCALES - GENERAL: PAINLEVEL: NO PAIN (0)

## 2021-02-22 ASSESSMENT — ENCOUNTER SYMPTOMS
ALLERGIC/IMMUNOLOGIC NEGATIVE: 1
EYES NEGATIVE: 1
ENDOCRINE NEGATIVE: 1
CONSTITUTIONAL NEGATIVE: 1

## 2021-02-22 ASSESSMENT — MIFFLIN-ST. JEOR: SCORE: 2097.51

## 2021-02-22 NOTE — PROGRESS NOTES
Chief Complaint:  F/U on kidney stone.    HPI: This patient is here today for follow-up.  He had a kidney stone earlier this month when he was in [facility].  He did have laser lithotripsy done.  He had a stent in place for for 5 days.  Nothing else was done and nothing else was found.  He is in today feeling great.  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    The last time he was in for a physical, he had a CT scan that was stable.  He was started on Crestor for his cholesterol, he is due for recheck on that.  He seems to be tolerating it fine.  He has also lost over 10 pounds since he was last in.    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     Colonic polyp      Erectile dysfunction      Hyperlipidemia      Kidney stones      Medullary cystic kidney     No Comments Provided     Personal history of nicotine dependence     No Comments Provided       Past Surgical History:   Procedure Laterality Date     APPENDECTOMY OPEN      No Comments Provided     AS REMOVAL OF KIDNEY STONE  02/2021     COLONOSCOPY      1/09,follow-up recommended in 6 months     COLONOSCOPY  01/11/2010 01/11/2010,F/U 2015     COLONOSCOPY  03/07/2016    3/7/16,F/U 2021 tubular adenomas     URETHROPLASTY         No Known Allergies    Current Outpatient Medications   Medication Sig Dispense Refill     ALPRAZolam (XANAX) 0.5 MG tablet Take 1 tablet twice daily as needed. 180 tablet 1     Multiple Vitamin (MULTI-VITAMINS) TABS Take 1 tablet by mouth daily       rosuvastatin (CRESTOR) 5 MG tablet Take 1 tablet (5 mg) by mouth daily 90 tablet 3       Review of Systems   Constitutional: Negative.    Eyes: Negative.    Endocrine: Negative.    Allergic/Immunologic: Negative.        Physical Exam  Vitals signs and nursing note reviewed.   Constitutional:       General: He is not in acute distress.     Appearance: Normal appearance. He is not ill-appearing,  toxic-appearing or diaphoretic.   Neurological:      Mental Status: He is alert.         Assessment:        ICD-10-CM    1. History of kidney stones  Z87.442 UA with Microscopic reflex to Culture       Plan: This patient had a recent kidney stone with treatment.  Nothing further needs to be done at this time although I did elect to do a urine to make sure there was no sign of infection, etc.  Continue current medications.  Blood tests are pending as well and I will message him all of his results.  This will be lab work for follow-up on his recent physical and the Crestor that we started him on.  Follow-up will be as needed.

## 2021-02-22 NOTE — TELEPHONE ENCOUNTER
Screening Questions for the Scheduling of Screening Colonoscopies   (If Colonoscopy is diagnostic, Provider should review the chart before scheduling.)  Are you younger than 50 or older than 80?  NO  Do you take aspirin or fish oil?  NO (if yes, tell patient to stop 1 week prior to Colonoscopy)  Do you take warfarin (Coumadin), clopidogrel (Plavix), apixaban (Eliquis), dabigatram (Pradaxa), rivaroxaban (Xarelto) or any blood thinner? NO  Do you use oxygen at home?  NO  Do you have kidney disease? SPONGE KIDNEY  Are you on dialysis? NO  Have you had a stroke or heart attack in the last year? NO  Have you had a stent in your heart or any blood vessel in the last year? NO  Have you had a transplant of any organ? NO  Have you had a colonoscopy or upper endoscopy (EGD) before? YES         When?  03/07/2016  Date of scheduled Colonoscopy. 03/15/2021  Provider TRAE  Pharmacy [facility]

## 2021-02-22 NOTE — LETTER
February 23, 2021      Garland Rice  [address]  [place] [address]        Dear Garland,    Below are the results of your recent labs:    Results for orders placed or performed in visit on 02/22/21   UA with Microscopic reflex to Culture     Status: Abnormal    Specimen: Midstream Urine   Result Value Ref Range    Color Urine Yellow     Appearance Urine Clear     Glucose Urine Negative NEG^Negative mg/dL    Bilirubin Urine Negative NEG^Negative    Ketones Urine Negative NEG^Negative mg/dL    Specific Gravity Urine 1.028 1.003 - 1.035    Blood Urine Trace (A) NEG^Negative    pH Urine 5.0 5.0 - 7.0 pH    Protein Albumin Urine 10 (A) NEG^Negative mg/dL    Urobilinogen mg/dL Normal 0.0 - 2.0 mg/dL    Nitrite Urine Negative NEG^Negative    Leukocyte Esterase Urine Negative NEG^Negative    Source Midstream Urine     WBC Urine 3 0 - 5 /HPF    RBC Urine 3 (H) 0 - 2 /HPF    Mucous Urine Present (A) NEG^Negative /LPF    Hyaline Casts 1 0 - 2 /LPF   Hepatitis C antibody     Status: None   Result Value Ref Range    Hepatitis C Antibody Nonreactive NR^Nonreactive   Lipid Profile     Status: Abnormal   Result Value Ref Range    Cholesterol 173 <200 mg/dL    Triglycerides 145 <150 mg/dL    HDL Cholesterol 42 23 - 92 mg/dL    LDL Cholesterol Calculated 102 (H) <100 mg/dL    Non HDL Cholesterol 131 (H) <130 mg/dL   Comprehensive metabolic panel     Status: Abnormal   Result Value Ref Range    Sodium 139 134 - 144 mmol/L    Potassium 4.1 3.5 - 5.1 mmol/L    Chloride 106 98 - 107 mmol/L    Carbon Dioxide 24 21 - 31 mmol/L    Anion Gap 9 3 - 14 mmol/L    Glucose 126 (H) 70 - 105 mg/dL    Urea Nitrogen 24 7 - 25 mg/dL    Creatinine 1.02 0.70 - 1.30 mg/dL    GFR Estimate 74 >60 mL/min/[1.73_m2]    GFR Estimate If Black 89 >60 mL/min/[1.73_m2]    Calcium 10.0 8.6 - 10.3 mg/dL    Bilirubin Total 0.6 0.3 - 1.0 mg/dL    Albumin 4.6 3.5 - 5.7 g/dL    Protein Total 8.0 6.4 - 8.9 g/dL    Alkaline Phosphatase 76 34 -  104 U/L    ALT 57 (H) 7 - 52 U/L    AST 31 13 - 39 U/L        Your blood and urine tests look fine.  Your cholesterol is much improved.  You do have a couple of red blood cells in your urine but that is expected with the recent kidney stone.  Overall I am satisfied with your results.    Sincerely,        Delta Falcon MD  Internal Medicine  [facility]

## 2021-02-22 NOTE — NURSING NOTE
"Chief Complaint   Patient presents with     RECHECK     Follow up on test results       Initial BP (!) 140/80 (BP Location: Right arm, Patient Position: Sitting, Cuff Size: Adult Regular)   Pulse 92   Temp 96.8  F (36  C) (Tympanic)   Resp 18   Ht 1.911 m (6' 3.25\")   Wt 121.3 kg (267 lb 6.4 oz)   SpO2 98%   BMI 33.20 kg/m   Estimated body mass index is 33.2 kg/m  as calculated from the following:    Height as of this encounter: 1.911 m (6' 3.25\").    Weight as of this encounter: 121.3 kg (267 lb 6.4 oz).  Medication Reconciliation: complete    Sandra Corbett LPN    "

## 2021-02-23 LAB — HCV AB SERPL QL IA: NONREACTIVE

## 2021-03-10 PROBLEM — F52.8 PSYCHOSEXUAL DYSFUNCTION WITH INHIBITED SEXUAL EXCITEMENT: Status: RESOLVED | Noted: 2018-01-17 | Resolved: 2021-03-10

## 2021-03-11 ENCOUNTER — ALLIED HEALTH/NURSE VISIT (OUTPATIENT)
Dept: FAMILY MEDICINE | Facility: OTHER | Age: 66
End: 2021-03-11
Attending: SURGERY
Payer: COMMERCIAL

## 2021-03-11 DIAGNOSIS — Z86.0101 H/O ADENOMATOUS POLYP OF COLON: ICD-10-CM

## 2021-03-11 LAB
SARS-COV-2 RNA RESP QL NAA+PROBE: NORMAL
SPECIMEN SOURCE: NORMAL

## 2021-03-11 PROCEDURE — C9803 HOPD COVID-19 SPEC COLLECT: HCPCS

## 2021-03-11 PROCEDURE — U0003 INFECTIOUS AGENT DETECTION BY NUCLEIC ACID (DNA OR RNA); SEVERE ACUTE RESPIRATORY SYNDROME CORONAVIRUS 2 (SARS-COV-2) (CORONAVIRUS DISEASE [COVID-19]), AMPLIFIED PROBE TECHNIQUE, MAKING USE OF HIGH THROUGHPUT TECHNOLOGIES AS DESCRIBED BY CMS-2020-01-R: HCPCS | Mod: ZL | Performed by: SURGERY

## 2021-03-11 PROCEDURE — U0005 INFEC AGEN DETEC AMPLI PROBE: HCPCS | Mod: ZL | Performed by: SURGERY

## 2021-03-12 LAB
LABORATORY COMMENT REPORT: NORMAL
SARS-COV-2 RNA RESP QL NAA+PROBE: NEGATIVE
SPECIMEN SOURCE: NORMAL

## 2021-03-15 ENCOUNTER — HOSPITAL ENCOUNTER (OUTPATIENT)
Facility: OTHER | Age: 66
Discharge: HOME | End: 2021-03-15
Attending: SURGERY | Admitting: SURGERY
Payer: COMMERCIAL

## 2021-03-15 ENCOUNTER — ANESTHESIA EVENT (OUTPATIENT)
Dept: SURGERY | Facility: OTHER | Age: 66
End: 2021-03-15
Payer: COMMERCIAL

## 2021-03-15 ENCOUNTER — ANESTHESIA (OUTPATIENT)
Dept: SURGERY | Facility: OTHER | Age: 66
End: 2021-03-15
Payer: COMMERCIAL

## 2021-03-15 VITALS
RESPIRATION RATE: 16 BRPM | OXYGEN SATURATION: 90 % | DIASTOLIC BLOOD PRESSURE: 96 MMHG | WEIGHT: 260 LBS | BODY MASS INDEX: 32.33 KG/M2 | HEART RATE: 61 BPM | TEMPERATURE: 97.5 F | SYSTOLIC BLOOD PRESSURE: 127 MMHG | HEIGHT: 75 IN

## 2021-03-15 PROBLEM — K63.5 COLON POLYPS: Status: ACTIVE | Noted: 2021-03-15

## 2021-03-15 PROCEDURE — 45380 COLONOSCOPY AND BIOPSY: CPT | Performed by: SURGERY

## 2021-03-15 PROCEDURE — 258N000003 HC RX IP 258 OP 636: Performed by: SURGERY

## 2021-03-15 PROCEDURE — 250N000009 HC RX 250: Performed by: SURGERY

## 2021-03-15 PROCEDURE — 45384 COLONOSCOPY W/LESION REMOVAL: CPT | Performed by: NURSE ANESTHETIST, CERTIFIED REGISTERED

## 2021-03-15 PROCEDURE — 258N000003 HC RX IP 258 OP 636: Performed by: NURSE ANESTHETIST, CERTIFIED REGISTERED

## 2021-03-15 PROCEDURE — 45384 COLONOSCOPY W/LESION REMOVAL: CPT | Mod: PT | Performed by: SURGERY

## 2021-03-15 PROCEDURE — 250N000013 HC RX MED GY IP 250 OP 250 PS 637: Performed by: SURGERY

## 2021-03-15 PROCEDURE — 250N000011 HC RX IP 250 OP 636: Performed by: NURSE ANESTHETIST, CERTIFIED REGISTERED

## 2021-03-15 PROCEDURE — 88305 TISSUE EXAM BY PATHOLOGIST: CPT

## 2021-03-15 RX ORDER — PROPOFOL 10 MG/ML
INJECTION, EMULSION INTRAVENOUS CONTINUOUS PRN
Status: DISCONTINUED | OUTPATIENT
Start: 2021-03-15 | End: 2021-03-15

## 2021-03-15 RX ORDER — NALOXONE HYDROCHLORIDE 0.4 MG/ML
0.2 INJECTION, SOLUTION INTRAMUSCULAR; INTRAVENOUS; SUBCUTANEOUS
Status: DISCONTINUED | OUTPATIENT
Start: 2021-03-15 | End: 2021-03-15 | Stop reason: HOSPADM

## 2021-03-15 RX ORDER — SODIUM CHLORIDE, SODIUM LACTATE, POTASSIUM CHLORIDE, CALCIUM CHLORIDE 600; 310; 30; 20 MG/100ML; MG/100ML; MG/100ML; MG/100ML
INJECTION, SOLUTION INTRAVENOUS CONTINUOUS
Status: DISCONTINUED | OUTPATIENT
Start: 2021-03-15 | End: 2021-03-15 | Stop reason: HOSPADM

## 2021-03-15 RX ORDER — NALOXONE HYDROCHLORIDE 0.4 MG/ML
0.4 INJECTION, SOLUTION INTRAMUSCULAR; INTRAVENOUS; SUBCUTANEOUS
Status: DISCONTINUED | OUTPATIENT
Start: 2021-03-15 | End: 2021-03-15 | Stop reason: HOSPADM

## 2021-03-15 RX ORDER — SIMETHICONE 40MG/0.6ML
SUSPENSION, DROPS(FINAL DOSAGE FORM)(ML) ORAL PRN
Status: DISCONTINUED | OUTPATIENT
Start: 2021-03-15 | End: 2021-03-15 | Stop reason: HOSPADM

## 2021-03-15 RX ORDER — PROPOFOL 10 MG/ML
INJECTION, EMULSION INTRAVENOUS PRN
Status: DISCONTINUED | OUTPATIENT
Start: 2021-03-15 | End: 2021-03-15

## 2021-03-15 RX ORDER — SODIUM CHLORIDE, SODIUM LACTATE, POTASSIUM CHLORIDE, CALCIUM CHLORIDE 600; 310; 30; 20 MG/100ML; MG/100ML; MG/100ML; MG/100ML
INJECTION, SOLUTION INTRAVENOUS CONTINUOUS PRN
Status: DISCONTINUED | OUTPATIENT
Start: 2021-03-15 | End: 2021-03-15

## 2021-03-15 RX ORDER — LIDOCAINE 40 MG/G
CREAM TOPICAL
Status: DISCONTINUED | OUTPATIENT
Start: 2021-03-15 | End: 2021-03-15 | Stop reason: HOSPADM

## 2021-03-15 RX ORDER — FLUMAZENIL 0.1 MG/ML
0.2 INJECTION, SOLUTION INTRAVENOUS
Status: DISCONTINUED | OUTPATIENT
Start: 2021-03-15 | End: 2021-03-15 | Stop reason: HOSPADM

## 2021-03-15 RX ORDER — ONDANSETRON 2 MG/ML
4 INJECTION INTRAMUSCULAR; INTRAVENOUS
Status: DISCONTINUED | OUTPATIENT
Start: 2021-03-15 | End: 2021-03-15 | Stop reason: HOSPADM

## 2021-03-15 RX ADMIN — PROPOFOL 100 MG: 10 INJECTION, EMULSION INTRAVENOUS at 12:43

## 2021-03-15 RX ADMIN — SODIUM CHLORIDE, POTASSIUM CHLORIDE, SODIUM LACTATE AND CALCIUM CHLORIDE: 600; 310; 30; 20 INJECTION, SOLUTION INTRAVENOUS at 12:38

## 2021-03-15 RX ADMIN — PROPOFOL 130 MCG/KG/MIN: 10 INJECTION, EMULSION INTRAVENOUS at 12:43

## 2021-03-15 RX ADMIN — SODIUM CHLORIDE, POTASSIUM CHLORIDE, SODIUM LACTATE AND CALCIUM CHLORIDE: 600; 310; 30; 20 INJECTION, SOLUTION INTRAVENOUS at 11:56

## 2021-03-15 ASSESSMENT — MIFFLIN-ST. JEOR: SCORE: 2054.98

## 2021-03-15 ASSESSMENT — LIFESTYLE VARIABLES: TOBACCO_USE: 1

## 2021-03-15 NOTE — ANESTHESIA POSTPROCEDURE EVALUATION
Patient: Garland Rice    Procedure(s):  COLONOSCOPY, WITH POLYPECTOMY AND BIOPSY    Diagnosis:History of colon polyps [Z86.010]  Diagnosis Additional Information: No value filed.    Anesthesia Type:  MAC    Note:  Disposition: Outpatient   Postop Pain Control: Uneventful            Sign Out: Well controlled pain   PONV: No   Neuro/Psych: Uneventful            Sign Out: Acceptable/Baseline neuro status   Airway/Respiratory: Uneventful            Sign Out: Acceptable/Baseline resp. status   CV/Hemodynamics: Uneventful            Sign Out: Acceptable CV status   Other NRE: NONE   DID A NON-ROUTINE EVENT OCCUR? No         Last vitals:  Vitals:    03/15/21 1136 03/15/21 1309 03/15/21 1315   BP: 125/88 90/58 95/61   Pulse: 77 72 71   Resp: 16     Temp: 96.8  F (36  C)     SpO2: 94% 96% 90%       Last vitals prior to Anesthesia Care Transfer:  CRNA VITALS  3/15/2021 1236 - 3/15/2021 1331      3/15/2021             Resp Rate (set):  10          Electronically Signed By: ERROL WARE CRNA  March 15, 2021  1:31 PM

## 2021-03-15 NOTE — ANESTHESIA PREPROCEDURE EVALUATION
Anesthesia Pre-Procedure Evaluation    Patient: Garland Rice   MRN: [id number] : 1955        Preoperative Diagnosis: History of colon polyps [Z86.010]   Procedure : Procedure(s):  COLONOSCOPY     Past Medical History:   Diagnosis Date     Benign neoplasm     2010,w/ severe atypia noted on colonoscopy     Colonic polyp      Erectile dysfunction      Hyperlipidemia      Kidney stones      Medullary cystic kidney     No Comments Provided     Personal history of nicotine dependence     No Comments Provided      Past Surgical History:   Procedure Laterality Date     APPENDECTOMY OPEN      No Comments Provided     AS REMOVAL OF KIDNEY STONE  2021     COLONOSCOPY      ,follow-up recommended in 6 months     COLONOSCOPY  2010,F/U      COLONOSCOPY  2016    3/7/16,F/U  tubular adenomas     URETHROPLASTY        No Known Allergies   Social History     Tobacco Use     Smoking status: Former Smoker     Packs/day: 0.50     Years: 40.00     Pack years: 20.00     Types: Cigarettes     Quit date: 1/3/2020     Years since quittin.1     Smokeless tobacco: Never Used   Substance Use Topics     Alcohol use: Yes     Frequency: 2-3 times a week     Drinks per session: 1 or 2     Comment: 2-3 times per week      Wt Readings from Last 1 Encounters:   03/15/21 117.9 kg (260 lb)        Anesthesia Evaluation   Pt has had prior anesthetic. Type: General.        ROS/MED HX  ENT/Pulmonary: Comment: Pulmonary nodules stable    (+) LEON risk factors, tobacco use, Past use,     Neurologic:  - neg neurologic ROS     Cardiovascular:       METS/Exercise Tolerance: >4 METS    Hematologic:  - neg hematologic  ROS     Musculoskeletal:  - neg musculoskeletal ROS     GI/Hepatic:     (+) bowel prep,     Renal/Genitourinary:     (+) Nephrolithiasis ,     Endo:  - neg endo ROS     Psychiatric/Substance Use:  - neg psychiatric ROS     Infectious Disease:  - neg infectious disease ROS     Malignancy:   - neg malignancy ROS     Other:  - neg other ROS          Physical Exam    Airway        Mallampati: II   TM distance: > 3 FB   Neck ROM: full   Mouth opening: > 3 cm    Respiratory Devices and Support         Dental  no notable dental history         Cardiovascular   cardiovascular exam normal          Pulmonary   pulmonary exam normal                OUTSIDE LABS:  CBC:   Lab Results   Component Value Date    WBC 7.6 10/17/2018    HGB 16.2 10/17/2018    HGB 17.1 04/14/2017    HCT 47.3 10/17/2018    HCT 50.4 04/14/2017     10/17/2018     04/14/2017     BMP:   Lab Results   Component Value Date     02/22/2021     12/09/2019    POTASSIUM 4.1 02/22/2021    POTASSIUM 4.5 12/09/2019    CHLORIDE 106 02/22/2021    CHLORIDE 105 12/09/2019    CO2 24 02/22/2021    CO2 30 12/09/2019    BUN 24 02/22/2021    BUN 21 12/09/2019    CR 1.02 02/22/2021    CR 1.07 12/09/2019     (H) 02/22/2021    GLC 86 12/09/2019     COAGS: No results found for: PTT, INR, FIBR  POC: No results found for: BGM, HCG, HCGS  HEPATIC:   Lab Results   Component Value Date    ALBUMIN 4.6 02/22/2021    PROTTOTAL 8.0 02/22/2021    ALT 57 (H) 02/22/2021    AST 31 02/22/2021    ALKPHOS 76 02/22/2021    BILITOTAL 0.6 02/22/2021     OTHER:   Lab Results   Component Value Date    ALYSON 10.0 02/22/2021    LIPASE 30.0 12/26/2014    SED 6 12/26/2014       Anesthesia Plan    ASA Status:  2   NPO Status:  NPO Appropriate    Anesthesia Type: MAC.   Induction: Propofol.           Consents    Anesthesia Plan(s) and associated risks, benefits, and realistic alternatives discussed. Questions answered and patient/representative(s) expressed understanding.     - Discussed with:  Patient      - Extended Intubation/Ventilatory Support Discussed: No.      - Patient is DNR/DNI Status: No    Use of blood products discussed: No .     Postoperative Care            Comments:                ERROL WARE CRNA

## 2021-03-15 NOTE — OR NURSING
Pt discharged home with his wife, Rowan.  Reviewed discharge instructions with patient and his wife.  Questions answered.  Pt ate muffin and juice.  Denies nausea, dizziness, or pain.  Denied need for wheelchair.  Pt walked out to front of [facility].

## 2021-03-15 NOTE — ANESTHESIA CARE TRANSFER NOTE
Patient: Garland Rice    Procedure(s):  COLONOSCOPY, WITH POLYPECTOMY AND BIOPSY    Diagnosis: History of colon polyps [Z86.010]  Diagnosis Additional Information: No value filed.    Anesthesia Type:   MAC     Note:      Level of Consciousness: drowsy  Oxygen Supplementation: face mask (Transported on O2 @ 10 L/min)  Level of Supplemental Oxygen (L/min / FiO2): 98%  Independent Airway: airway patency satisfactory and stable  Dentition: dentition unchanged  Vital Signs Stable: post-procedure vital signs reviewed and stable  Report to RN Given: handoff report given  Patient transferred to: Phase II    Handoff Report: Identifed the Patient, Identified the Reponsible Provider, Reviewed the pertinent medical history, Discussed the surgical course, Reviewed Intra-OP anesthesia mangement and issues during anesthesia, Set expectations for post-procedure period and Allowed opportunity for questions and acknowledgement of understanding      Vitals: (Last set prior to Anesthesia Care Transfer)  CRNA VITALS  3/15/2021 1236 - 3/15/2021 1309      3/15/2021             Resp Rate (set):  10        Electronically Signed By: David Kellerman, APRN CRNA  March 15, 2021  1:09 PM

## 2021-03-15 NOTE — H&P
"History and Physical    CHIEF COMPLAINT / REASON FOR PROCEDURE:  H/o polyps    PERTINENT HISTORY   Patient is a 66 year old male who presents today for colonoscopy for h/o polyps.   Last colonoscopy 2016.  Patient has no complaints.    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     Colonic polyp      Erectile dysfunction      Hyperlipidemia      Kidney stones      Medullary cystic kidney     No Comments Provided     Personal history of nicotine dependence     No Comments Provided     Past Surgical History:   Procedure Laterality Date     APPENDECTOMY OPEN      No Comments Provided     AS REMOVAL OF KIDNEY STONE  02/2021     COLONOSCOPY      1/09,follow-up recommended in 6 months     COLONOSCOPY  01/11/2010 01/11/2010,F/U 2015     COLONOSCOPY  03/07/2016    3/7/16,F/U 2021 tubular adenomas     URETHROPLASTY         Bleeding tendencies:  No    ALLERGIES/SENSITIVITIES: No Known Allergies     CURRENT MEDICATIONS:    Prior to Admission medications    Medication Sig Start Date End Date Taking? Authorizing Provider   ALPRAZolam (XANAX) 0.5 MG tablet Take 1 tablet twice daily as needed. 12/11/20  Yes Delta Falcon MD   bisacodyl (DULCOLAX) 5 MG EC tablet Use as directed for colonoscopy prep 2/22/21  Yes Deon Mcmillan MD   Multiple Vitamin (MULTI-VITAMINS) TABS Take 1 tablet by mouth daily   Yes Reported, Patient   polyethylene glycol (MIRALAX) 17 GM/Dose powder Mix with 255g with 64 oz of Gatorade (not red or purple) drink at a rate of 8oz every 10 min as directed for colonoscopy prep 2/22/21  Yes Deon Mcmillan MD   rosuvastatin (CRESTOR) 5 MG tablet Take 1 tablet (5 mg) by mouth daily 12/3/20  Yes Delta Falcon MD       Physical Exam:  /88 (Cuff Size: Adult Regular)   Pulse 77   Temp 96.8  F (36  C) (Tympanic)   Resp 16   Ht 1.905 m (6' 3\")   Wt 117.9 kg (260 lb)   SpO2 94%   BMI 32.50 kg/m    EXAM:  Chest/Respiratory Exam: Normal - " Clear to auscultation without rales, rhonchi, or wheezing.  Cardiovascular Exam: normal, regular rate and rhythm        PLAN: COLONOSCOPY .  Patient understands risks of bleeding, perforation, potential inability to reach cecum, aspiration and wishes to proceed.

## 2021-03-15 NOTE — DISCHARGE INSTRUCTIONS
[facility]  Adult Discharge Orders & Instructions    ________________________________________________________________          For 12 hours after surgery  1. Get plenty of rest.  A responsible adult must stay with you for at least 12 hours after you leave the hospital.   2. You may feel lightheaded.  IF so, sit for a few minutes before standing.  Have someone help you get up.   3. You may have a slight fever. Call the doctor if your fever is over 101 F (38.3 C) (taken under the tongue) or lasts longer than 24 hours.  4. You may have a dry mouth, a sore throat, muscle aches or trouble sleeping.  These should go away after 24 hours.  5. Do not make important or legal decisions.  6.   Do not drive or use heavy equipment.  If you have weakness or tingling, don't drive or use heavy equipment until this feeling goes away.    To contact a doctor, call   [phone]_______________________

## 2021-03-15 NOTE — OP NOTE
PROCEDURE NOTE    DATE OF SERVICE: 3/15/2021    SURGEON: Deon Mcmillan MD    PRE-OP DIAGNOSIS:    History of Polyps        POST-OP DIAGNOSIS:  Same  Sigmoid Diverticulosis  Polyps at 30 cm and 20 cm    PROCEDURE:     Colonoscopy with hot biopsy      ANESTHESIA:  MAC D Kellerman CRNA    INDICATION FOR THE PROCEDURE: The patient is a 66 year old male with h/o polyps . The patient has no other complaints  . After explaining the risks to include bleeding, perforation, potential inability toreach the cecum, the patient wished to proceed.    PROCEDURE:After adequate sedation, the patient was in the left lateral decubitus position.  Rectal exam was performed.  There was normal tone and no palpable masses .  The colonoscope was introduced into the rectum and advanced to the cecum with Mild difficulty.  The patient's prep was good.  The terminal cecum was reached.  The cecum, ascending, transverse, descending and sigmoid colon was with sigmoid diverticulosis and diminutive polyps at 30 cm and 20 cm that were hot biopsied and destroyed .  The scope was retroflexed in the rectum.  The rectum was unremarkable  .  The scope was straightened and removed.  The patient tolerated the procedure well.     ESTIMATED BLOOD LOSS: none    COMPLICATIONS:  None    TISSUE REMOVED:  Yes    RECOMMEND:      Follow-up pending pathology  Fiber  Given literature on diverticulosis    Deon Mcmillan MD FACS

## 2021-03-18 PROBLEM — K63.5 COLON POLYPS: Status: RESOLVED | Noted: 2021-03-15 | Resolved: 2021-03-18

## 2021-06-09 ENCOUNTER — MYC MEDICAL ADVICE (OUTPATIENT)
Dept: INTERNAL MEDICINE | Facility: OTHER | Age: 66
End: 2021-06-09

## 2021-06-09 DIAGNOSIS — F41.8 OTHER SPECIFIED ANXIETY DISORDERS: ICD-10-CM

## 2021-06-09 RX ORDER — ALPRAZOLAM 0.5 MG
TABLET ORAL
Qty: 180 TABLET | Refills: 1 | Status: SHIPPED | OUTPATIENT
Start: 2021-06-09 | End: 2022-01-18

## 2021-06-09 RX ORDER — ALPRAZOLAM 0.5 MG
TABLET ORAL
Qty: 180 TABLET | Refills: 0 | OUTPATIENT
Start: 2021-06-09

## 2021-06-09 NOTE — TELEPHONE ENCOUNTER
ALPRAZolam 0.5 MG Oral Tablet  Duplicate.  Refilled today.  Unable to complete prescription refill per RNMedication Refill Policy.................... Madeline Patricio RN ....................  6/9/2021   2:48 PM

## 2021-09-02 ENCOUNTER — MYC MEDICAL ADVICE (OUTPATIENT)
Dept: INTERNAL MEDICINE | Facility: OTHER | Age: 66
End: 2021-09-02

## 2021-09-02 DIAGNOSIS — F41.8 OTHER SPECIFIED ANXIETY DISORDERS: ICD-10-CM

## 2021-09-02 RX ORDER — ALPRAZOLAM 0.5 MG
TABLET ORAL
Qty: 180 TABLET | Refills: 1 | Status: CANCELLED | OUTPATIENT
Start: 2021-09-02

## 2021-09-02 NOTE — TELEPHONE ENCOUNTER
[facility] already has a script for this and are filling it. Please let patient  know.    Te Andrews LPN .......  9/2/2021  2:28 PM

## 2021-09-02 NOTE — TELEPHONE ENCOUNTER
This prescription was sent in to [facility] on 6/9/21. Left message for patient to call back to see if he got this. [facility] is not open until 9:00, so will try them after while to see if they received this, also.    ALPRAZolam (XANAX) 0.5 MG tablet 180 tablet 1 6/9/2021  No   Sig: Take 1 tablet twice daily as needed.       Florencia Grace CMA([facility])..................9/2/2021   8:39 AM

## 2021-09-02 NOTE — TELEPHONE ENCOUNTER
Patient should have an active prescription from June 9 at the pharmacy.    Dr. Falcon sent in Xanax 0.5 mg, 180 tablets with 1 refill.    If needed, okay to give verbal for the same prescription.     Electronically signed by:  Guy Rush MD  on September 2, 2021 11:37 AM

## 2021-09-03 DIAGNOSIS — F41.8 OTHER SPECIFIED ANXIETY DISORDERS: ICD-10-CM

## 2021-09-08 RX ORDER — ALPRAZOLAM 0.5 MG
TABLET ORAL
Qty: 180 TABLET | Refills: 0 | OUTPATIENT
Start: 2021-09-08

## 2021-12-07 DIAGNOSIS — E78.5 HYPERLIPIDEMIA, UNSPECIFIED HYPERLIPIDEMIA TYPE: ICD-10-CM

## 2021-12-09 RX ORDER — ROSUVASTATIN CALCIUM 5 MG/1
TABLET, COATED ORAL
Qty: 90 TABLET | Refills: 0 | Status: SHIPPED | OUTPATIENT
Start: 2021-12-09 | End: 2022-01-18

## 2022-01-15 ENCOUNTER — HEALTH MAINTENANCE LETTER (OUTPATIENT)
Age: 67
End: 2022-01-15

## 2022-01-18 ENCOUNTER — OFFICE VISIT (OUTPATIENT)
Dept: INTERNAL MEDICINE | Facility: OTHER | Age: 67
End: 2022-01-18
Attending: INTERNAL MEDICINE
Payer: COMMERCIAL

## 2022-01-18 VITALS
BODY MASS INDEX: 33.45 KG/M2 | RESPIRATION RATE: 20 BRPM | DIASTOLIC BLOOD PRESSURE: 80 MMHG | HEART RATE: 91 BPM | HEIGHT: 75 IN | OXYGEN SATURATION: 96 % | WEIGHT: 269 LBS | SYSTOLIC BLOOD PRESSURE: 136 MMHG | TEMPERATURE: 97.9 F

## 2022-01-18 DIAGNOSIS — F41.9 ANXIETY: ICD-10-CM

## 2022-01-18 DIAGNOSIS — Z87.891 PERSONAL HISTORY OF TOBACCO USE: ICD-10-CM

## 2022-01-18 DIAGNOSIS — E78.5 HYPERLIPIDEMIA, UNSPECIFIED HYPERLIPIDEMIA TYPE: Primary | ICD-10-CM

## 2022-01-18 DIAGNOSIS — R35.1 NOCTURIA: ICD-10-CM

## 2022-01-18 DIAGNOSIS — Z87.2 HISTORY OF VASCULITIS OF SKIN: ICD-10-CM

## 2022-01-18 DIAGNOSIS — F41.8 OTHER SPECIFIED ANXIETY DISORDERS: ICD-10-CM

## 2022-01-18 DIAGNOSIS — R91.8 PULMONARY NODULES: ICD-10-CM

## 2022-01-18 DIAGNOSIS — M79.89 CALF SWELLING: ICD-10-CM

## 2022-01-18 DIAGNOSIS — G62.9 PERIPHERAL POLYNEUROPATHY: ICD-10-CM

## 2022-01-18 PROCEDURE — 90682 RIV4 VACC RECOMBINANT DNA IM: CPT | Performed by: INTERNAL MEDICINE

## 2022-01-18 PROCEDURE — 90471 IMMUNIZATION ADMIN: CPT | Performed by: INTERNAL MEDICINE

## 2022-01-18 PROCEDURE — 99396 PREV VISIT EST AGE 40-64: CPT | Mod: 25 | Performed by: INTERNAL MEDICINE

## 2022-01-18 PROCEDURE — G0296 VISIT TO DETERM LDCT ELIG: HCPCS | Performed by: INTERNAL MEDICINE

## 2022-01-18 RX ORDER — ROSUVASTATIN CALCIUM 5 MG/1
5 TABLET, COATED ORAL DAILY
Qty: 90 TABLET | Refills: 3 | Status: SHIPPED | OUTPATIENT
Start: 2022-01-18 | End: 2023-01-19

## 2022-01-18 RX ORDER — ALPRAZOLAM 0.5 MG
TABLET ORAL
Qty: 180 TABLET | Refills: 1 | Status: SHIPPED | OUTPATIENT
Start: 2022-01-18 | End: 2022-05-31

## 2022-01-18 ASSESSMENT — ENCOUNTER SYMPTOMS
EYE REDNESS: 0
ABDOMINAL PAIN: 0
UNEXPECTED WEIGHT CHANGE: 0
DIAPHORESIS: 0
DYSURIA: 0
FREQUENCY: 0
DIZZINESS: 0
WOUND: 0
TROUBLE SWALLOWING: 0
NECK PAIN: 0
CHEST TIGHTNESS: 0
CHILLS: 0
SEIZURES: 0
DIARRHEA: 0
TREMORS: 0
PALPITATIONS: 0
ADENOPATHY: 0
CONFUSION: 0
COUGH: 0
RHINORRHEA: 0
BACK PAIN: 0
APPETITE CHANGE: 0
FATIGUE: 0
FLANK PAIN: 0
DIFFICULTY URINATING: 0
SORE THROAT: 0
JOINT SWELLING: 0
HEMATURIA: 0
HEADACHES: 0
VOICE CHANGE: 0
EYE PAIN: 0
CONSTIPATION: 0
SPEECH DIFFICULTY: 0
WHEEZING: 0
HALLUCINATIONS: 0
FEVER: 0
NUMBNESS: 1
SINUS PRESSURE: 0
NAUSEA: 0
AGITATION: 0
VOMITING: 0
BRUISES/BLEEDS EASILY: 0
NERVOUS/ANXIOUS: 1
SINUS PAIN: 0
NECK STIFFNESS: 0
WEAKNESS: 0
BLOOD IN STOOL: 0
SHORTNESS OF BREATH: 0
ABDOMINAL DISTENTION: 0
SLEEP DISTURBANCE: 0
LIGHT-HEADEDNESS: 0

## 2022-01-18 ASSESSMENT — MIFFLIN-ST. JEOR: SCORE: 2095.81

## 2022-01-18 ASSESSMENT — PAIN SCALES - GENERAL: PAINLEVEL: NO PAIN (0)

## 2022-01-18 NOTE — NURSING NOTE
"Chief Complaint   Patient presents with     Physical       Initial /80   Pulse 91   Temp 97.9  F (36.6  C) (Tympanic)   Resp 20   Ht 1.905 m (6' 3\")   Wt 122 kg (269 lb)   SpO2 96%   BMI 33.62 kg/m   Estimated body mass index is 33.62 kg/m  as calculated from the following:    Height as of this encounter: 1.905 m (6' 3\").    Weight as of this encounter: 122 kg (269 lb).  FOOD SECURITY SCREENING QUESTIONS  Hunger Vital Signs:  Within the past 12 months we worried whether our food would run out before we got money to buy more. Never  Within the past 12 months the food we bought just didn't last and we didn't have money to get more. Never        Gabe Peterson, DOUG    "

## 2022-01-18 NOTE — PROGRESS NOTES
Chief Complaint:  This patient is here for a comprehensive review of their multiple medical problems, renewal of medications and update on necessary health maintenance issues.      HPI: He comes in today for complete evaluation.  He has a history of hyperlipidemia and was intolerant of simvastatin.  Last year I put him on Crestor low-dose and he is tolerating that with no significant problems.  He is due to have his lab rechecked but he is not fasting today.  He has a history of chronic anxiety.  He is controlled with Xanax.  Other therapies have not been successful for him so we are just continuing with that.    He was seen in [place] with a rash.  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  He did not bring the reports today.  Today he also tells me that he worries about a blood clot.  He thinks his right calf is more swollen than it has been in the past.  There is really no reason for him to have a DVT.    He has trouble with peripheral neuropathy.  It is mainly numbness in his toes left foot greater than right but it seems to be progressing.  He wonders what might be causing this.    He has a remote history of smoking, having quit 2 years ago.  He does have pulmonary nodules.  He is due for a follow-up CT scan on this.    Medications are reconciled.  Past medical history, past surgical history, family history and social histories are reviewed and updated.  Immunizations are reviewed, he would like to get a flu shot today.    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     Colonic polyp      Erectile dysfunction      History of vasculitis of skin      Hyperlipidemia      Kidney stones      Medullary cystic kidney     No Comments Provided     Personal history of nicotine dependence     No Comments Provided       Past Surgical History:   Procedure Laterality Date     APPENDECTOMY OPEN      No Comments Provided     AS  REMOVAL OF KIDNEY STONE  2021     COLONOSCOPY      ,follow-up recommended in 6 months     COLONOSCOPY  2010,F/U      COLONOSCOPY  2016    3/7/16,F/U  tubular adenomas     COLONOSCOPY N/A 03/15/2021    F/U  h/o adenomatous polyps     URETHROPLASTY         Current Outpatient Medications   Medication Sig Dispense Refill     ALPRAZolam (XANAX) 0.5 MG tablet Take 1 tablet twice daily as needed. 180 tablet 1     Multiple Vitamin (MULTI-VITAMINS) TABS Take 1 tablet by mouth daily       rosuvastatin (CRESTOR) 5 MG tablet Take 1 tablet (5 mg) by mouth daily 90 tablet 3       No Known Allergies    Family History   Problem Relation Age of Onset     Emphysema Mother      Arthritis Father      Other - See Comments Sister         Unknown     Other - See Comments Brother         Recovering addict     Coronary Artery Disease Brother 61     Colon Cancer Brother      Coronary Artery Disease Maternal Grandfather         Coronary artery disease,60- lived into 80s- smoked early     Other - See Comments Other         No FHx DM or CAD, no cancers in first degree relatives.       Social History     Socioeconomic History     Marital status:      Spouse name: Not on file     Number of children: Not on file     Years of education: Not on file     Highest education level: Not on file   Occupational History     Not on file   Tobacco Use     Smoking status: Former Smoker     Packs/day: 0.50     Years: 40.00     Pack years: 20.00     Types: Cigarettes     Quit date: 1/3/2020     Years since quittin.0     Smokeless tobacco: Never Used   Substance and Sexual Activity     Alcohol use: Yes     Comment: 2-3 times per week     Drug use: Never     Sexual activity: Yes     Partners: Female   Other Topics Concern     Parent/sibling w/ CABG, MI or angioplasty before 65F 55M? Not Asked   Social History Narrative    Travels to work in [place] as a  for 100+ day stretches. Will be gone for 6+ months  a year.  , wife works at [employer]. No financial or relationship concerns.     Social Determinants of Health     Financial Resource Strain: Not on file   Food Insecurity: Not on file   Transportation Needs: Not on file   Physical Activity: Not on file   Stress: Not on file   Social Connections: Not on file   Intimate Partner Violence: Not on file   Housing Stability: Not on file       Review of Systems   Constitutional: Negative for appetite change, chills, diaphoresis, fatigue, fever and unexpected weight change.   HENT: Negative for ear pain, rhinorrhea, sinus pressure, sinus pain, sore throat, trouble swallowing and voice change.    Eyes: Negative for pain, redness and visual disturbance.   Respiratory: Negative for cough, chest tightness, shortness of breath and wheezing.    Cardiovascular: Positive for leg swelling. Negative for chest pain and palpitations.   Gastrointestinal: Negative for abdominal distention, abdominal pain, blood in stool, constipation, diarrhea, nausea and vomiting.   Endocrine: Negative for cold intolerance and heat intolerance.   Genitourinary: Negative for difficulty urinating, dysuria, flank pain, frequency and hematuria.   Musculoskeletal: Negative for back pain, joint swelling, neck pain and neck stiffness.   Skin: Negative for rash and wound.   Allergic/Immunologic: Negative for immunocompromised state.   Neurological: Positive for numbness. Negative for dizziness, tremors, seizures, syncope, speech difficulty, weakness, light-headedness and headaches.   Hematological: Negative for adenopathy. Does not bruise/bleed easily.   Psychiatric/Behavioral: Negative for agitation, behavioral problems, confusion, hallucinations and sleep disturbance. The patient is nervous/anxious.        Physical Exam  Vitals and nursing note reviewed.   Constitutional:       General: He is not in acute distress.     Appearance: He is well-developed. He is not diaphoretic.   HENT:      Head:  Normocephalic.      Right Ear: Tympanic membrane, ear canal and external ear normal.      Left Ear: Tympanic membrane, ear canal and external ear normal.      Nose: Nose normal.      Mouth/Throat:      Pharynx: No oropharyngeal exudate.   Eyes:      Conjunctiva/sclera: Conjunctivae normal.      Pupils: Pupils are equal, round, and reactive to light.   Neck:      Thyroid: No thyroid mass or thyromegaly.      Vascular: Normal carotid pulses. No carotid bruit or JVD.      Trachea: No tracheal deviation.   Cardiovascular:      Rate and Rhythm: Normal rate and regular rhythm.      Heart sounds: Normal heart sounds. No murmur heard.  No friction rub. No gallop.    Pulmonary:      Effort: Pulmonary effort is normal. No respiratory distress.      Breath sounds: Normal breath sounds. No stridor. No decreased breath sounds, wheezing, rhonchi or rales.   Abdominal:      General: Bowel sounds are normal. There is no distension.      Palpations: Abdomen is soft. There is no mass.      Tenderness: There is no abdominal tenderness. There is no guarding or rebound.      Hernia: No hernia is present.   Genitourinary:     Penis: Normal.       Testes: Normal.      Prostate: Normal.      Rectum: Normal.      Comments: Prostate high riding and difficult to fully evaluate  Musculoskeletal:         General: Normal range of motion.      Cervical back: Normal range of motion and neck supple.      Right lower leg: No edema.      Left lower leg: No edema.      Comments: I do not appreciate any definite findings consistent with DVT or other in his lower extremities   Lymphadenopathy:      Cervical: No cervical adenopathy.   Skin:     General: Skin is warm and dry.      Findings: No rash.   Neurological:      Mental Status: He is alert and oriented to person, place, and time.      Cranial Nerves: No cranial nerve deficit.      Sensory: No sensory deficit.      Motor: No abnormal muscle tone.      Coordination: Coordination normal.      Deep  Tendon Reflexes: Reflexes normal.         Assessment:      ICD-10-CM    1. Hyperlipidemia, unspecified hyperlipidemia type  E78.5 Comprehensive metabolic panel     Lipid Panel     rosuvastatin (CRESTOR) 5 MG tablet   2. Personal history of tobacco use  Z87.891 Prof Fee: Shared Decision Making Visit for Lung Cancer Screening     CT Chest Lung Cancer Scrn Low Dose wo   3. Pulmonary nodules  R91.8    4. Anxiety  F41.9    5. History of vasculitis of skin  Z87.2    6. Nocturia  R35.1 Prostate Specific Antigen   7. Peripheral polyneuropathy  G62.9 Hemoglobin A1c     Vitamin B12   8. Calf swelling  M79.89 D dimer quantitative   9. Other specified anxiety disorders  F41.8 ALPRAZolam (XANAX) 0.5 MG tablet        Plan: Overall he appears to be doing well.  His neuropathy seems to be his biggest complaint and he is also worried about a blood clot.  He is going to have lab work done in about a week when he is fasting and I will include a B12, A1c as well as a D-dimer and I will let him know the results.  Medications will continue without change, refills done today.  Influenza vaccine given today.  He will be scheduled for a CT for follow-up lung cancer screening, I will let him know the results.  At some point in time if his neuropathy worsens, he will call and I can get him scheduled for an EMG of the lower extremities to further evaluate.  I asked him to get me the information on the vasculitis from his left leg biopsy.  If all goes well, follow-up annually.        Lung Cancer Screening Shared Decision Making Visit     Garland Rice is eligible for lung cancer screening on the basis of the information provided in my signed lung cancer screening order.     I have discussed with patient the risks and benefits of screening for lung cancer with low-dose CT.     The risks include:  radiation exposure: one low dose chest CT has as much ionizing radiation as about 15 chest x-rays or 6 months of background radiation living in  [place]    false positives: 96% of positive findings/nodules are NOT cancer, but some might still require additional diagnostic evaluation, including biopsy  over-diagnosis: some slow growing cancers that might never have been clinically significant will be detected and treated unnecessarily     The benefit of early detection of lung cancer is contingent upon adherence to annual screening or more frequent follow up if indicated.     Furthermore, reaping the benefits of screening requires Garland Rice to be willing and physically able to undergo diagnostic procedures, if indicated. Although no specific guide is available for determining severity of comorbidities, it is reasonable to withhold screening in patients who have greater mortality risk from other diseases.     We did discuss that the only way to prevent lung cancer is to not smoke. Smoking cessation counseling was not given.      I did not offer risk estimation using a calculator such as this one:    ShouldIScreen        Answers for HPI/ROS submitted by the patient on 1/18/2022  Getting at least 3 servings of Calcium per day:: Yes  Diet:: Low fat/cholesterol, Other  Taking medications regularly:: Yes  Medication side effects:: None  Bi-annual eye exam:: NO  Dental care twice a year:: NO  Sleep apnea or symptoms of sleep apnea:: Daytime drowsiness  Additional concerns today:: Yes  Duration of exercise:: 15-30 minutes

## 2022-01-18 NOTE — PATIENT INSTRUCTIONS

## 2022-01-28 ENCOUNTER — HOSPITAL ENCOUNTER (OUTPATIENT)
Dept: CT IMAGING | Facility: OTHER | Age: 67
Discharge: HOME | End: 2022-01-28
Attending: INTERNAL MEDICINE | Admitting: INTERNAL MEDICINE
Payer: COMMERCIAL

## 2022-01-28 DIAGNOSIS — Z87.891 PERSONAL HISTORY OF TOBACCO USE: ICD-10-CM

## 2022-01-28 PROCEDURE — 71271 CT THORAX LUNG CANCER SCR C-: CPT

## 2022-02-01 ENCOUNTER — LAB (OUTPATIENT)
Dept: LAB | Facility: OTHER | Age: 67
End: 2022-02-01
Attending: INTERNAL MEDICINE
Payer: COMMERCIAL

## 2022-02-01 DIAGNOSIS — M79.89 CALF SWELLING: ICD-10-CM

## 2022-02-01 DIAGNOSIS — G62.9 PERIPHERAL POLYNEUROPATHY: ICD-10-CM

## 2022-02-01 DIAGNOSIS — E78.5 HYPERLIPIDEMIA, UNSPECIFIED HYPERLIPIDEMIA TYPE: ICD-10-CM

## 2022-02-01 DIAGNOSIS — R35.1 NOCTURIA: ICD-10-CM

## 2022-02-01 LAB
ALBUMIN SERPL-MCNC: 4.6 G/DL (ref 3.5–5.7)
ALP SERPL-CCNC: 61 U/L (ref 34–104)
ALT SERPL W P-5'-P-CCNC: 27 U/L (ref 7–52)
ANION GAP SERPL CALCULATED.3IONS-SCNC: 5 MMOL/L (ref 3–14)
AST SERPL W P-5'-P-CCNC: 18 U/L (ref 13–39)
BILIRUB SERPL-MCNC: 0.6 MG/DL (ref 0.3–1)
BUN SERPL-MCNC: 16 MG/DL (ref 7–25)
CALCIUM SERPL-MCNC: 9.7 MG/DL (ref 8.6–10.3)
CHLORIDE BLD-SCNC: 105 MMOL/L (ref 98–107)
CHOLEST SERPL-MCNC: 135 MG/DL
CO2 SERPL-SCNC: 26 MMOL/L (ref 21–31)
CREAT SERPL-MCNC: 0.92 MG/DL (ref 0.7–1.3)
D DIMER PPP FEU-MCNC: 0.53 UG/ML FEU (ref 0–0.5)
FASTING STATUS PATIENT QL REPORTED: NORMAL
GFR SERPL CREATININE-BSD FRML MDRD: >90 ML/MIN/1.73M2
GLUCOSE BLD-MCNC: 105 MG/DL (ref 70–105)
HBA1C MFR BLD: 5.9 % (ref 4–6.2)
HDLC SERPL-MCNC: 40 MG/DL (ref 23–92)
LDLC SERPL CALC-MCNC: 74 MG/DL
NONHDLC SERPL-MCNC: 95 MG/DL
POTASSIUM BLD-SCNC: 4 MMOL/L (ref 3.5–5.1)
PROT SERPL-MCNC: 7 G/DL (ref 6.4–8.9)
PSA SERPL-MCNC: 0.4 UG/L (ref 0–4)
SODIUM SERPL-SCNC: 136 MMOL/L (ref 134–144)
TRIGL SERPL-MCNC: 105 MG/DL
VIT B12 SERPL-MCNC: 436 PG/ML (ref 180–914)

## 2022-02-01 PROCEDURE — 83036 HEMOGLOBIN GLYCOSYLATED A1C: CPT | Mod: ZL

## 2022-02-01 PROCEDURE — 80061 LIPID PANEL: CPT | Mod: ZL

## 2022-02-01 PROCEDURE — 84153 ASSAY OF PSA TOTAL: CPT | Mod: ZL

## 2022-02-01 PROCEDURE — 85379 FIBRIN DEGRADATION QUANT: CPT | Mod: ZL

## 2022-02-01 PROCEDURE — 82607 VITAMIN B-12: CPT | Mod: ZL

## 2022-02-01 PROCEDURE — 36415 COLL VENOUS BLD VENIPUNCTURE: CPT | Mod: ZL

## 2022-02-01 PROCEDURE — 80053 COMPREHEN METABOLIC PANEL: CPT | Mod: ZL

## 2022-03-02 DIAGNOSIS — F41.8 OTHER SPECIFIED ANXIETY DISORDERS: ICD-10-CM

## 2022-03-02 RX ORDER — ALPRAZOLAM 0.5 MG
TABLET ORAL
Qty: 180 TABLET | Refills: 0 | OUTPATIENT
Start: 2022-03-02

## 2022-03-02 NOTE — TELEPHONE ENCOUNTER
Disp Refills Start End RUBA   ALPRAZolam (XANAX) 0.5 MG tablet 180 tablet 1 1/18/2022  No   Sig: Take 1 tablet twice daily as needed     To [facility] should have refills  Valeria Myers RN on 3/2/2022 at 4:06 PM

## 2022-04-01 NOTE — TELEPHONE ENCOUNTER
ALPRAZolam (XANAX) 0.5 MG tablet 180 tablet 1 6/9/2021  No   Sig: Take 1 tablet twice daily as needed     To Josh Myers RN on 9/8/2021 at 9:21 AM    
0

## 2022-05-28 ENCOUNTER — MYC MEDICAL ADVICE (OUTPATIENT)
Dept: INTERNAL MEDICINE | Facility: OTHER | Age: 67
End: 2022-05-28
Payer: COMMERCIAL

## 2022-05-28 DIAGNOSIS — F41.8 OTHER SPECIFIED ANXIETY DISORDERS: ICD-10-CM

## 2022-05-31 RX ORDER — ALPRAZOLAM 0.5 MG
TABLET ORAL
Qty: 180 TABLET | Refills: 1 | Status: SHIPPED | OUTPATIENT
Start: 2022-05-31 | End: 2022-12-06

## 2023-01-04 ENCOUNTER — IMMUNIZATION (OUTPATIENT)
Dept: FAMILY MEDICINE | Facility: OTHER | Age: 68
End: 2023-01-04
Attending: INTERNAL MEDICINE
Payer: COMMERCIAL

## 2023-01-04 DIAGNOSIS — Z23 NEED FOR PROPHYLACTIC VACCINATION AND INOCULATION AGAINST INFLUENZA: Primary | ICD-10-CM

## 2023-01-04 DIAGNOSIS — Z23 HIGH PRIORITY FOR 2019-NCOV VACCINE: ICD-10-CM

## 2023-01-04 PROCEDURE — 90662 IIV NO PRSV INCREASED AG IM: CPT

## 2023-01-04 PROCEDURE — 91312 COVID-19 VACCINE BIVALENT BOOSTER 12+ (PFIZER): CPT

## 2023-01-19 ENCOUNTER — OFFICE VISIT (OUTPATIENT)
Dept: INTERNAL MEDICINE | Facility: OTHER | Age: 68
End: 2023-01-19
Attending: INTERNAL MEDICINE
Payer: COMMERCIAL

## 2023-01-19 VITALS
BODY MASS INDEX: 34.29 KG/M2 | RESPIRATION RATE: 20 BRPM | HEART RATE: 70 BPM | TEMPERATURE: 96.8 F | DIASTOLIC BLOOD PRESSURE: 86 MMHG | OXYGEN SATURATION: 95 % | HEIGHT: 74 IN | WEIGHT: 267.2 LBS | SYSTOLIC BLOOD PRESSURE: 136 MMHG

## 2023-01-19 DIAGNOSIS — R35.1 NOCTURIA: ICD-10-CM

## 2023-01-19 DIAGNOSIS — F41.9 ANXIETY: ICD-10-CM

## 2023-01-19 DIAGNOSIS — E78.5 HYPERLIPIDEMIA, UNSPECIFIED HYPERLIPIDEMIA TYPE: ICD-10-CM

## 2023-01-19 DIAGNOSIS — M79.89 CALF SWELLING: ICD-10-CM

## 2023-01-19 DIAGNOSIS — R73.9 HYPERGLYCEMIA: ICD-10-CM

## 2023-01-19 DIAGNOSIS — Z87.891 PERSONAL HISTORY OF TOBACCO USE: Primary | ICD-10-CM

## 2023-01-19 DIAGNOSIS — Z87.2 HISTORY OF VASCULITIS OF SKIN: ICD-10-CM

## 2023-01-19 DIAGNOSIS — G62.9 PERIPHERAL POLYNEUROPATHY: ICD-10-CM

## 2023-01-19 DIAGNOSIS — Z86.0101 H/O ADENOMATOUS POLYP OF COLON: ICD-10-CM

## 2023-01-19 LAB
ALBUMIN SERPL BCG-MCNC: 4.6 G/DL (ref 3.5–5.2)
ALP SERPL-CCNC: 87 U/L (ref 40–129)
ALT SERPL W P-5'-P-CCNC: 32 U/L (ref 10–50)
ANION GAP SERPL CALCULATED.3IONS-SCNC: 9 MMOL/L (ref 7–15)
AST SERPL W P-5'-P-CCNC: 24 U/L (ref 10–50)
BILIRUB SERPL-MCNC: 0.4 MG/DL
BUN SERPL-MCNC: 19.2 MG/DL (ref 8–23)
CALCIUM SERPL-MCNC: 9.5 MG/DL (ref 8.8–10.2)
CHLORIDE SERPL-SCNC: 103 MMOL/L (ref 98–107)
CHOLEST SERPL-MCNC: 139 MG/DL
CREAT SERPL-MCNC: 0.96 MG/DL (ref 0.67–1.17)
DEPRECATED HCO3 PLAS-SCNC: 27 MMOL/L (ref 22–29)
GFR SERPL CREATININE-BSD FRML MDRD: 88 ML/MIN/1.73M2
GLUCOSE SERPL-MCNC: 96 MG/DL (ref 70–99)
HBA1C MFR BLD: 5.7 % (ref 4–6.2)
HDLC SERPL-MCNC: 43 MG/DL
HOLD SPECIMEN: NORMAL
LDLC SERPL CALC-MCNC: 71 MG/DL
NONHDLC SERPL-MCNC: 96 MG/DL
POTASSIUM SERPL-SCNC: 4.5 MMOL/L (ref 3.4–5.3)
PROT SERPL-MCNC: 7.3 G/DL (ref 6.4–8.3)
PSA SERPL-MCNC: 0.43 NG/ML (ref 0–4.5)
SODIUM SERPL-SCNC: 139 MMOL/L (ref 136–145)
TRIGL SERPL-MCNC: 127 MG/DL

## 2023-01-19 PROCEDURE — G0296 VISIT TO DETERM LDCT ELIG: HCPCS | Performed by: INTERNAL MEDICINE

## 2023-01-19 PROCEDURE — 80061 LIPID PANEL: CPT | Mod: ZL | Performed by: INTERNAL MEDICINE

## 2023-01-19 PROCEDURE — 36415 COLL VENOUS BLD VENIPUNCTURE: CPT | Mod: ZL | Performed by: INTERNAL MEDICINE

## 2023-01-19 PROCEDURE — 99213 OFFICE O/P EST LOW 20 MIN: CPT | Mod: 25 | Performed by: INTERNAL MEDICINE

## 2023-01-19 PROCEDURE — 84153 ASSAY OF PSA TOTAL: CPT | Mod: ZL | Performed by: INTERNAL MEDICINE

## 2023-01-19 PROCEDURE — G0438 PPPS, INITIAL VISIT: HCPCS | Mod: 52 | Performed by: INTERNAL MEDICINE

## 2023-01-19 PROCEDURE — 80053 COMPREHEN METABOLIC PANEL: CPT | Mod: ZL | Performed by: INTERNAL MEDICINE

## 2023-01-19 PROCEDURE — 83036 HEMOGLOBIN GLYCOSYLATED A1C: CPT | Mod: ZL | Performed by: INTERNAL MEDICINE

## 2023-01-19 PROCEDURE — G0463 HOSPITAL OUTPT CLINIC VISIT: HCPCS | Mod: 25

## 2023-01-19 RX ORDER — ROSUVASTATIN CALCIUM 5 MG/1
5 TABLET, COATED ORAL DAILY
Qty: 90 TABLET | Refills: 3 | Status: SHIPPED | OUTPATIENT
Start: 2023-01-19 | End: 2024-03-25

## 2023-01-19 RX ORDER — TRIAMCINOLONE ACETONIDE 1 MG/G
OINTMENT TOPICAL 2 TIMES DAILY
Qty: 30 G | Refills: 3 | Status: SHIPPED | OUTPATIENT
Start: 2023-01-19

## 2023-01-19 RX ORDER — ASCORBIC ACID 500 MG
500 TABLET ORAL DAILY
COMMUNITY
Start: 2023-01-19

## 2023-01-19 ASSESSMENT — ENCOUNTER SYMPTOMS
CONSTIPATION: 0
NAUSEA: 0
EYE PAIN: 1
MYALGIAS: 0
SORE THROAT: 1
FREQUENCY: 0
COUGH: 1
FEVER: 0
HEMATOCHEZIA: 0
CHILLS: 0
JOINT SWELLING: 0
SHORTNESS OF BREATH: 1
NERVOUS/ANXIOUS: 1
HEMATURIA: 0
HEARTBURN: 0
DYSURIA: 0
PALPITATIONS: 0
ABDOMINAL PAIN: 0
ARTHRALGIAS: 1
WEAKNESS: 0
DIARRHEA: 0
DIZZINESS: 0
PARESTHESIAS: 0
HEADACHES: 0

## 2023-01-19 ASSESSMENT — PAIN SCALES - GENERAL: PAINLEVEL: NO PAIN (0)

## 2023-01-19 ASSESSMENT — ACTIVITIES OF DAILY LIVING (ADL): CURRENT_FUNCTION: NO ASSISTANCE NEEDED

## 2023-01-19 NOTE — PATIENT INSTRUCTIONS
Continue your current medications without change.    Complete blood tests today, I will send you a MyChart letter with the results.    They will call you to schedule the CT scan of your lungs, I will let you know the results.    They will call you to schedule the dermatology appointment.    Make sure you see the eye doctor today as we discussed.    For your restless leg symptoms on the plane, use the alprazolam as it will likely help.    Work hard on a healthy diet and weight loss.    Assuming all goes well, follow-up annually.

## 2023-01-19 NOTE — LETTER
January 23, 2023      Garland Rice  [address]  [place] [address]        Dear Jorge L,    Below are the results of your recent labs:    Results for orders placed or performed in visit on 01/19/23   Comprehensive metabolic panel     Status: Normal   Result Value Ref Range    Sodium 139 136 - 145 mmol/L    Potassium 4.5 3.4 - 5.3 mmol/L    Chloride 103 98 - 107 mmol/L    Carbon Dioxide (CO2) 27 22 - 29 mmol/L    Anion Gap 9 7 - 15 mmol/L    Urea Nitrogen 19.2 8.0 - 23.0 mg/dL    Creatinine 0.96 0.67 - 1.17 mg/dL    Calcium 9.5 8.8 - 10.2 mg/dL    Glucose 96 70 - 99 mg/dL    Alkaline Phosphatase 87 40 - 129 U/L    AST 24 10 - 50 U/L    ALT 32 10 - 50 U/L    Protein Total 7.3 6.4 - 8.3 g/dL    Albumin 4.6 3.5 - 5.2 g/dL    Bilirubin Total 0.4 <=1.2 mg/dL    GFR Estimate 88 >60 mL/min/1.73m2   Lipid Panel     Status: Normal   Result Value Ref Range    Cholesterol 139 <200 mg/dL    Triglycerides 127 <150 mg/dL    Direct Measure HDL 43 >=40 mg/dL    LDL Cholesterol Calculated 71 <=100 mg/dL    Non HDL Cholesterol 96 <130 mg/dL    Narrative    Cholesterol  Desirable:  <200 mg/dL    Triglycerides  Normal:  Less than 150 mg/dL  Borderline High:  150-199 mg/dL  High:  200-499 mg/dL  Very High:  Greater than or equal to 500 mg/dL    Direct Measure HDL  Female:  Greater than or equal to 50 mg/dL   Male:  Greater than or equal to 40 mg/dL    LDL Cholesterol  Desirable:  <100mg/dL  Above Desirable:  100-129 mg/dL   Borderline High:  130-159 mg/dL   High:  160-189 mg/dL   Very High:  >= 190 mg/dL    Non HDL Cholesterol  Desirable:  130 mg/dL  Above Desirable:  130-159 mg/dL  Borderline High:  160-189 mg/dL  High:  190-219 mg/dL  Very High:  Greater than or equal to 220 mg/dL   Prostate Specific Antigen     Status: Normal   Result Value Ref Range    PSA Tumor Marker 0.43 0.00 - 4.50 ng/mL    Narrative    This result is obtained using the Roche Elecsys total PSA method on the yelitza [id number] immunoassay analyzer.  Results obtained with different assay methods or kits cannot be used interchangeably.   Hemoglobin A1c     Status: Normal   Result Value Ref Range    Hemoglobin A1C 5.7 4.0 - 6.2 %   Extra Tube     Status: None    Narrative    The following orders were created for panel order Extra Tube.  Procedure                               Abnormality         Status                     ---------                               -----------         ------                     Extra Serum Separator Tu...[[id number]]                      Final result                 Please view results for these tests on the individual orders.   Extra Serum Separator Tube (SST)     Status: None   Result Value Ref Range    Hold Specimen Hold         Your blood tests are normal.  Congratulations on this report.    Sincerely,        Delta Falcon MD  Internal Medicine  [facility]

## 2023-01-19 NOTE — NURSING NOTE
"Chief Complaint   Patient presents with     Physical   Patient presents to the clinic for wellness exam.    FOOD SECURITY SCREENING QUESTIONS:    The next two questions are to help us understand your food security.  If you are feeling you need any assistance in this area, we have resources available to support you today.    Hunger Vital Signs:  Within the past 12 months we worried whether our food would run out before we got money to buy more. Never  Within the past 12 months the food we bought just didn't last and we didn't have money to get more. Never    Advance Care Directive on file? No  Advance Care Directive provided to patient? Declined        Initial /86 (BP Location: Right arm, Patient Position: Sitting, Cuff Size: Adult Large)   Pulse 70   Temp 96.8  F (36  C) (Temporal)   Resp 20   Ht 1.88 m (6' 2\")   Wt 121.2 kg (267 lb 3.2 oz)   SpO2 95%   BMI 34.31 kg/m   Estimated body mass index is 34.31 kg/m  as calculated from the following:    Height as of this encounter: 1.88 m (6' 2\").    Weight as of this encounter: 121.2 kg (267 lb 3.2 oz).  Medication Reconciliation: complete        Alexandria Mustafa  "

## 2023-01-19 NOTE — PROGRESS NOTES
Lung Cancer Screening Shared Decision Making Visit     Garland Rice, a 67 year old male, is eligible for lung cancer screening    History   Smoking Status     Former     Packs/day: 0.50     Years: 40.00     Types: Cigarettes     Quit date: 1/3/2020   Smokeless Tobacco     Never       I have discussed with patient the risks and benefits of screening for lung cancer with low-dose CT.     The risks include:    radiation exposure: one low dose chest CT has as much ionizing radiation as about 15 chest x-rays, or 6 months of background radiation living in [place]      false positives: most findings/nodules are NOT cancer, but some might still require additional diagnostic evaluation, including biopsy    over-diagnosis: some slow growing cancers that might never have been clinically significant will be detected and treated unnecessarily     The benefit of early detection of lung cancer is contingent upon adherence to annual screening or more frequent follow up if indicated.     Furthermore, to benefit from screening, Garland must be willing and able to undergo diagnostic procedures, if indicated. Although no specific guide is available for determining severity of comorbidities, it is reasonable to withhold screening in patients who have greater mortality risk from other diseases.     We did discuss that the best way to prevent lung cancer is to not smoke.    Some patients may value a numeric estimation of lung cancer risk when evaluating if lung cancer screening is right for them, here is one calculator:    ShouldIScreen

## 2023-01-19 NOTE — PROGRESS NOTES
SUBJECTIVE:   Garland Rice is a 67 year old male who presents for Preventive Visit.    This patient comes in today for a Medicare wellness visit.  He has a history of chronic anxiety and he takes alprazolam twice daily as needed.  We have previously talked about more chronic anxiety medications but he is very resistant to try anything else because the alprazolam does work for him very well.  He also has a history of hyperlipidemia and takes low intensity statin therapy.  No known vascular disease.    He has a personal history of smoking 20+ years.  He would like to get another CT scan for lung cancer screening.  He states that he has had some pain in his ribs on either side so he does worry about such things.  He has a history of kidney stones but currently is asymptomatic.  He does have some nocturia.  He has a history of vasculitis, he had a biopsy of his skin done in [place] at some point in time that suggested vasculitis.  He has a rash on his right chest and abdomen that is quite pruritic.  He would like to get a better explanation on the whole vasculitis question.    He has a history of colonic polyps but is up-to-date with colonoscopy.  He has chronic peripheral neuropathy, likely idiopathic.  He has had all testing and short of an EMG.  He would like to get another prescription for Augmentin to have on hand when he is working remotely in [place].  He has some restless leg symptoms that are more bothersome when he is on the airplane.    Medications are reconciled.  Past medical history, past surgical history, family history and social histories are reviewed and updated.      Patient has been advised of split billing requirements and indicates understanding: Yes Answers for HPI/ROS submitted by the patient on 1/19/2023  In general, how would you rate your overall physical health?: good  Frequency of exercise:: None  Do you usually eat at least 4 servings of fruit and vegetables a day, include whole grains  "& fiber, and avoid regularly eating high fat or \"junk\" foods? : No  Taking medications regularly:: Yes  Medication side effects:: None  Activities of Daily Living: no assistance needed  Home safety: no safety concerns identified  Hearing Impairment:: difficulty following a conversation in a noisy restaurant or crowded room, need to ask people to speak up or repeat themselves  In the past 6 months, have you been bothered by leaking of urine?: No  abdominal pain: No  Blood in stool: No  Blood in urine: No  chest pain: No  chills: No  congestion: No  constipation: No  cough: Yes  diarrhea: No  dizziness: No  ear pain: No  eye pain: Yes  nervous/anxious: Yes  fever: No  frequency: No  genital sores: No  headaches: No  hearing loss: Yes  heartburn: No  arthralgias: Yes  joint swelling: No  peripheral edema: Yes  mood changes: No  myalgias: No  nausea: No  dysuria: No  palpitations: No  Skin sensation changes: No  sore throat: Yes  urgency: No  rash: Yes  shortness of breath: Yes  visual disturbance: Yes  weakness: No  impotence: Yes  penile discharge: No  In general, how would you rate your overall mental or emotional health?: good  Additional concerns today:: Yes      Are you in the first 12 months of your Medicare Part B coverage?  No      Do you feel safe in your environment? Yes    Have you ever done Advance Care Planning? (For example, a Health Directive, POLST, or a discussion with a medical provider or your loved ones about your wishes): No, advance care planning information given to patient to review.  Patient plans to discuss their wishes with loved ones or provider.      Additional concerns to address?  YES    Fall risk:       Cognitive Screenin) Repeat 3 items (Leader, Season, Table)    2) Clock draw: NORMAL  3) 3 item recall: Recalls 3 objects  Results: 3 items recalled: COGNITIVE IMPAIRMENT LESS LIKELY    Mini-CogTM Copyright ELIZABETH Urbina. Licensed by the author for use in [facility]; " reprinted with permission ([email or web address]). All rights reserved.      Do you have sleep apnea, excessive snoring or daytime drowsiness?: no        Current Outpatient Medications   Medication     ALPRAZolam (XANAX) 0.5 MG tablet     amoxicillin-clavulanate (AUGMENTIN) 875-125 MG tablet     Multiple Vitamin (MULTI-VITAMINS) TABS     rosuvastatin (CRESTOR) 5 MG tablet     triamcinolone (KENALOG) 0.1 % external ointment     vitamin C (ASCORBIC ACID) 500 MG tablet     No current facility-administered medications for this visit.         Reviewed and updated as needed this visit by clinical staff   Tobacco  Allergies  Meds  Problems  Med Hx  Surg Hx  Fam Hx  Soc   Hx        Reviewed and updated as needed this visit by Provider   Tobacco  Allergies  Meds  Problems  Med Hx  Surg Hx  Fam Hx         Social History     Tobacco Use     Smoking status: Former     Packs/day: 0.50     Years: 40.00     Pack years: 20.00     Types: Cigarettes     Quit date: 1/3/2020     Years since quitting: 3.0     Smokeless tobacco: Never   Substance Use Topics     Alcohol use: Yes     Comment: 2-3 times per week                           Current providers sharing in care for this patient include:   Patient Care Team:  Delta Falcon MD as PCP - General (Internal Medicine)  Delta Falcon MD as Assigned PCP    The following health maintenance items are reviewed in Epic and correct as of today:  Health Maintenance   Topic Date Due     ZOSTER IMMUNIZATION (1 of 2) Never done     Pneumococcal Vaccine: 65+ Years (2 - PCV) 12/03/2021     FALL RISK ASSESSMENT  02/25/2022     LUNG CANCER SCREENING  01/28/2023     DTAP/TDAP/TD IMMUNIZATION (4 - Td or Tdap) 09/16/2023     MEDICARE ANNUAL WELLNESS VISIT  01/19/2024     COLORECTAL CANCER SCREENING  03/15/2026     LIPID  02/01/2027     ADVANCE CARE PLANNING  01/19/2028     HEPATITIS C SCREENING  Completed     PHQ-2 (once per calendar year)  Completed     INFLUENZA VACCINE  Completed     AORTIC  "ANEURYSM SCREENING (SYSTEM ASSIGNED)  Completed     COVID-19 Vaccine  Completed     IPV IMMUNIZATION  Aged Out     MENINGITIS IMMUNIZATION  Aged Out     HIV SCREENING  Discontinued     Lab work is in process      ROS:  Constitutional, HEENT, cardiovascular, pulmonary, GI, , musculoskeletal, neuro, skin, endocrine and psych systems are negative, except as otherwise noted.    OBJECTIVE:   /86 (BP Location: Right arm, Patient Position: Sitting, Cuff Size: Adult Large)   Pulse 70   Temp 96.8  F (36  C) (Temporal)   Resp 20   Ht 1.88 m (6' 2\")   Wt 121.2 kg (267 lb 3.2 oz)   SpO2 95%   BMI 34.31 kg/m   Estimated body mass index is 34.31 kg/m  as calculated from the following:    Height as of this encounter: 1.88 m (6' 2\").    Weight as of this encounter: 121.2 kg (267 lb 3.2 oz).  EXAM:   GENERAL: healthy, alert and no distress  EYES: Eyes grossly normal to inspection, PERRL and conjunctivae and sclerae normal  HENT: ear canals and TM's normal, nose and mouth without ulcers or lesions  NECK: no adenopathy, no asymmetry, masses, or scars and thyroid normal to palpation  RESP: lungs clear to auscultation - no rales, rhonchi or wheezes  CV: regular rate and rhythm, normal S1 S2, no S3 or S4, no murmur, click or rub, no peripheral edema and peripheral pulses strong  ABDOMEN: soft, nontender, no hepatosplenomegaly, no masses and bowel sounds normal  : Normal male, no hernia.  Prostate exam is deferred as he has a very high riding prostate  MS: no gross musculoskeletal defects noted, no edema  SKIN: Nonspecific erythematous patch right upper chest and right lower abdomen  NEURO: Normal strength and tone, mentation intact and speech normal  PSYCH: mentation appears normal, affect normal/bright        ASSESSMENT / PLAN:       ICD-10-CM    1. Personal history of tobacco use  Z87.891 Prof fee: Shared Decision Making for Lung Cancer Screening     CT Chest Lung Cancer Scrn Low Dose wo      2. Hyperlipidemia, " "unspecified hyperlipidemia type  E78.5 Comprehensive metabolic panel     Lipid Panel     rosuvastatin (CRESTOR) 5 MG tablet      3. H/O adenomatous polyp of colon  Z86.010       4. Anxiety  F41.9       5. Hyperglycemia  R73.9 Hemoglobin A1c      6. Nocturia  R35.1 Prostate Specific Antigen      7. Calf swelling  M79.89       8. History of vasculitis of skin  Z87.2 amoxicillin-clavulanate (AUGMENTIN) 875-125 MG tablet     triamcinolone (KENALOG) 0.1 % external ointment     Adult Dermatology Referral      9. Peripheral polyneuropathy  G62.9           Patient has been advised of split billing requirements and indicates understanding: Yes    COUNSELING:    His exam today is fairly unremarkable.  He is going to try some triamcinolone for the rash on his skin but quit this rash and with his history of vasculitis I really think he should see a dermatologist.  He is in agreement so consultation request is placed.  Complete lab drawn and pending, I will send him a letter with the results and any recommendations.  We will get him scheduled for a CT scan because of his smoking history, I will let him know the results.  Obviously if there is anything in his ribs causing pain this would show up on the CT so he was reassured that this will be evaluated.  For his neuropathy, I am not going to do anything further, I did tell him that at some point it might be reasonable to do an EMG to evaluate.  He will see the eye doctor today for his vision complaints.  For his restless leg symptoms on the plane I suggested that he just use his benzodiazepine rather than get a new medication.  Follow-up will be dependent on his clinical course.      Reviewed preventive health counseling, as reflected in patient instructions       Regular exercise       Healthy diet/nutrition    Estimated body mass index is 34.31 kg/m  as calculated from the following:    Height as of this encounter: 1.88 m (6' 2\").    Weight as of this encounter: 121.2 kg (267 " lb 3.2 oz).    Weight management plan: Discussed healthy diet and exercise guidelines    He reports that he quit smoking about 3 years ago. His smoking use included cigarettes. He has a 20.00 pack-year smoking history. He has never used smokeless tobacco.    Appropriate preventive services were discussed with this patient, including applicable screening as appropriate for cardiovascular disease, diabetes, osteopenia/osteoporosis, and glaucoma.  As appropriate for age/gender, discussed screening for colorectal cancer, prostate cancer, breast cancer, and cervical cancer. Checklist reviewing preventive services available has been given to the patient.    Reviewed patients plan of care and provided an AVS. The Basic Care Plan (routine screening as documented in Health Maintenance) for Garland meets the Care Plan requirement. This Care Plan has been established and reviewed with the Patient    Counseling Resources:  ATP IV Guidelines  Pooled Cohorts Equation Calculator  Breast Cancer Risk Calculator  BRCA-Related Cancer Risk Assessment: FHS-7 Tool  FRAX Risk Assessment  ICSI Preventive Guidelines  Dietary Guidelines for Americans, 2010  USDA's MyPlate  ASA Prophylaxis  Lung CA Screening    JESÚS DIAZ MD  [facility]

## 2023-03-03 DIAGNOSIS — F41.8 OTHER SPECIFIED ANXIETY DISORDERS: ICD-10-CM

## 2023-03-06 NOTE — TELEPHONE ENCOUNTER
[facility] sent Rx request for the following:    ALPRAZolam 0.5 MG Oral Tablet  Last Prescription Date:   12/6/22  Last Fill Qty/Refills:         180, R-0    Last Office Visit:              1/19/23   Future Office visit:           None     Jaki Johns RN on 3/6/2023 at 8:56 AM

## 2023-03-07 RX ORDER — ALPRAZOLAM 0.5 MG
TABLET ORAL
Qty: 180 TABLET | Refills: 0 | Status: SHIPPED | OUTPATIENT
Start: 2023-03-07 | End: 2023-06-06

## 2023-03-14 ENCOUNTER — HOSPITAL ENCOUNTER (OUTPATIENT)
Dept: CT IMAGING | Facility: OTHER | Age: 68
Discharge: HOME | End: 2023-03-14
Attending: INTERNAL MEDICINE | Admitting: INTERNAL MEDICINE
Payer: COMMERCIAL

## 2023-03-14 DIAGNOSIS — Z87.891 PERSONAL HISTORY OF TOBACCO USE: ICD-10-CM

## 2023-03-14 PROCEDURE — 71271 CT THORAX LUNG CANCER SCR C-: CPT

## 2023-05-26 ENCOUNTER — TELEPHONE (OUTPATIENT)
Dept: INTERNAL MEDICINE | Facility: OTHER | Age: 68
End: 2023-05-26
Payer: COMMERCIAL

## 2023-05-26 NOTE — TELEPHONE ENCOUNTER
Dr. Falcon is listed as PCP for this patient and there is a RX prior authorization request for a refill on his Alprazolam 0.5MG tablets. The PA request is asking for his PCP. Who would I list? The last time he saw Dr. Falcon and this was prescribed was in March. Thank you.  Elizabeth Valles on 5/26/2023 at 4:23 PM

## 2023-05-31 NOTE — TELEPHONE ENCOUNTER
Call placed to Jorge L and advised he will need to establish care with a new PCP and establish care to have this completed. He will call and schedule visit.  Analilia Vargas LPN on 5/31/2023 at 11:58 AM

## 2023-06-06 ENCOUNTER — OFFICE VISIT (OUTPATIENT)
Dept: FAMILY MEDICINE | Facility: OTHER | Age: 68
End: 2023-06-06
Attending: NURSE PRACTITIONER
Payer: COMMERCIAL

## 2023-06-06 VITALS
BODY MASS INDEX: 34.73 KG/M2 | OXYGEN SATURATION: 96 % | HEART RATE: 80 BPM | SYSTOLIC BLOOD PRESSURE: 138 MMHG | RESPIRATION RATE: 20 BRPM | WEIGHT: 270.6 LBS | TEMPERATURE: 98.2 F | HEIGHT: 74 IN | DIASTOLIC BLOOD PRESSURE: 88 MMHG

## 2023-06-06 DIAGNOSIS — G25.81 RESTLESS LEG SYNDROME: Primary | ICD-10-CM

## 2023-06-06 DIAGNOSIS — F41.8 OTHER SPECIFIED ANXIETY DISORDERS: ICD-10-CM

## 2023-06-06 PROCEDURE — G0463 HOSPITAL OUTPT CLINIC VISIT: HCPCS | Performed by: NURSE PRACTITIONER

## 2023-06-06 PROCEDURE — 99213 OFFICE O/P EST LOW 20 MIN: CPT | Performed by: NURSE PRACTITIONER

## 2023-06-06 RX ORDER — ALPRAZOLAM 0.5 MG
0.5 TABLET ORAL 2 TIMES DAILY PRN
Qty: 180 TABLET | Refills: 1 | Status: SHIPPED | OUTPATIENT
Start: 2023-06-06 | End: 2023-10-05

## 2023-06-06 ASSESSMENT — ANXIETY QUESTIONNAIRES
8. IF YOU CHECKED OFF ANY PROBLEMS, HOW DIFFICULT HAVE THESE MADE IT FOR YOU TO DO YOUR WORK, TAKE CARE OF THINGS AT HOME, OR GET ALONG WITH OTHER PEOPLE?: SOMEWHAT DIFFICULT
7. FEELING AFRAID AS IF SOMETHING AWFUL MIGHT HAPPEN: NOT AT ALL
2. NOT BEING ABLE TO STOP OR CONTROL WORRYING: NOT AT ALL
7. FEELING AFRAID AS IF SOMETHING AWFUL MIGHT HAPPEN: NOT AT ALL
3. WORRYING TOO MUCH ABOUT DIFFERENT THINGS: NOT AT ALL
GAD7 TOTAL SCORE: 3
IF YOU CHECKED OFF ANY PROBLEMS ON THIS QUESTIONNAIRE, HOW DIFFICULT HAVE THESE PROBLEMS MADE IT FOR YOU TO DO YOUR WORK, TAKE CARE OF THINGS AT HOME, OR GET ALONG WITH OTHER PEOPLE: SOMEWHAT DIFFICULT
5. BEING SO RESTLESS THAT IT IS HARD TO SIT STILL: NOT AT ALL
6. BECOMING EASILY ANNOYED OR IRRITABLE: SEVERAL DAYS
GAD7 TOTAL SCORE: 3
4. TROUBLE RELAXING: SEVERAL DAYS
1. FEELING NERVOUS, ANXIOUS, OR ON EDGE: SEVERAL DAYS
GAD7 TOTAL SCORE: 3

## 2023-06-06 ASSESSMENT — PAIN SCALES - GENERAL: PAINLEVEL: NO PAIN (0)

## 2023-06-06 ASSESSMENT — ENCOUNTER SYMPTOMS: NERVOUS/ANXIOUS: 1

## 2023-06-06 NOTE — NURSING NOTE
Patient presents today for anxiety concerns.    Medication Reconciliation Complete    Sandra Cortes LPN  6/6/2023 11:23 AM

## 2023-06-06 NOTE — PROGRESS NOTES
Assessment & Plan   Problem List Items Addressed This Visit        Musculoskeletal and Integumentary    Restless leg syndrome - Primary       Behavioral    Other specified anxiety disorders    Relevant Medications    ALPRAZolam (XANAX) 0.5 MG tablet      Anxiety and restless leg syndrome, alprazolam has been beneficial.  We did discuss as he is tolerating medication well, no need to change at this time.  If he would like to consider coming off this medication we could work on tapering him off of this and looking at other management for anxiety.  He may also benefit from psychiatry involvement.  At this time he would like to continue with alprazolam as prescribed.  [place]  was reviewed, prescription was refilled x6 months.    Prescription drug management  25 minutes spent by me on the date of the encounter doing chart review, history and exam, documentation and further activities per the note           No follow-ups on file.    ERROL Mcknight [facility]    Nichole Coats is a 68 year old, presenting for the following health issues:  Anxiety  Anxiety    History of Present Illness       Mental Health Follow-up:  Patient presents to follow-up on Anxiety.    Patient's anxiety since last visit has been:  Worse  The patient is not having other symptoms associated with anxiety.  Any significant life events: job concerns  Patient is feeling anxious or having panic attacks.  Patient has no concerns about alcohol or drug use.    He eats 2-3 servings of fruits and vegetables daily.He consumes 1 sweetened beverage(s) daily.He exercises with enough effort to increase his heart rate 60 or more minutes per day.  He exercises with enough effort to increase his heart rate 6 days per week.   He is taking medications regularly.  Today's SHARAD-7 Score: 3     He comes in today for refill of alprazolam.  He would like to establish care as his previous provider has retired.  He reports he has been  "taking alprazolam for anxiety twice daily as needed for many years.  Over the past 3 months he has used this twice daily, 3 months prior to that he would use about 1-1/2 tablets daily.  He does report using this for restless leg syndrome as well, symptoms typically flare up about 7:30 in the evening.  Alprazolam has been helpful in managing this.  He does report history of trying multiple antidepressant medications but these have either not been effective or have caused significant side effects including sertraline, Effexor and BuSpar.  He is worried about the long-term use of alprazolam and wanted to make sure that it was okay to take this.      Review of Systems   Psychiatric/Behavioral: The patient is nervous/anxious.    see above         Objective    /88   Pulse 80   Temp 98.2  F (36.8  C)   Resp 20   Ht 1.88 m (6' 2\")   Wt 122.7 kg (270 lb 9.6 oz)   SpO2 96%   BMI 34.74 kg/m    Body mass index is 34.74 kg/m .  Physical Exam   GENERAL: healthy, alert and no distress  RESP: lungs clear to auscultation - no rales, rhonchi or wheezes  CV: regular rate and rhythm, normal S1 S2  NEURO: Normal strength and tone, mentation intact and speech normal  PSYCH: mentation appears normal, affect normal/bright        "

## 2023-10-05 ENCOUNTER — HOSPITAL ENCOUNTER (OUTPATIENT)
Dept: GENERAL RADIOLOGY | Facility: OTHER | Age: 68
Discharge: HOME | End: 2023-10-05
Attending: NURSE PRACTITIONER
Payer: COMMERCIAL

## 2023-10-05 ENCOUNTER — OFFICE VISIT (OUTPATIENT)
Dept: FAMILY MEDICINE | Facility: OTHER | Age: 68
End: 2023-10-05
Attending: NURSE PRACTITIONER
Payer: COMMERCIAL

## 2023-10-05 VITALS
TEMPERATURE: 98 F | OXYGEN SATURATION: 96 % | WEIGHT: 262 LBS | BODY MASS INDEX: 33.62 KG/M2 | DIASTOLIC BLOOD PRESSURE: 84 MMHG | RESPIRATION RATE: 20 BRPM | HEIGHT: 74 IN | HEART RATE: 68 BPM | SYSTOLIC BLOOD PRESSURE: 138 MMHG

## 2023-10-05 DIAGNOSIS — M62.838 MUSCLE SPASM: ICD-10-CM

## 2023-10-05 DIAGNOSIS — M54.50 CHRONIC BILATERAL LOW BACK PAIN WITHOUT SCIATICA: Primary | ICD-10-CM

## 2023-10-05 DIAGNOSIS — F41.8 OTHER SPECIFIED ANXIETY DISORDERS: ICD-10-CM

## 2023-10-05 DIAGNOSIS — M54.50 CHRONIC BILATERAL LOW BACK PAIN WITHOUT SCIATICA: ICD-10-CM

## 2023-10-05 DIAGNOSIS — G89.29 CHRONIC BILATERAL LOW BACK PAIN WITHOUT SCIATICA: ICD-10-CM

## 2023-10-05 DIAGNOSIS — L57.0 ACTINIC KERATOSIS: ICD-10-CM

## 2023-10-05 DIAGNOSIS — G89.29 CHRONIC BILATERAL LOW BACK PAIN WITHOUT SCIATICA: Primary | ICD-10-CM

## 2023-10-05 DIAGNOSIS — R07.81 RIB PAIN ON RIGHT SIDE: ICD-10-CM

## 2023-10-05 DIAGNOSIS — M51.369 DDD (DEGENERATIVE DISC DISEASE), LUMBAR: ICD-10-CM

## 2023-10-05 PROCEDURE — G0463 HOSPITAL OUTPT CLINIC VISIT: HCPCS | Mod: 25

## 2023-10-05 PROCEDURE — 91320 SARSCV2 VAC 30MCG TRS-SUC IM: CPT

## 2023-10-05 PROCEDURE — 72100 X-RAY EXAM L-S SPINE 2/3 VWS: CPT

## 2023-10-05 PROCEDURE — 17110 DESTRUCTION B9 LES UP TO 14: CPT | Mod: XU | Performed by: NURSE PRACTITIONER

## 2023-10-05 PROCEDURE — 71101 X-RAY EXAM UNILAT RIBS/CHEST: CPT | Mod: RT

## 2023-10-05 PROCEDURE — G0008 ADMIN INFLUENZA VIRUS VAC: HCPCS

## 2023-10-05 PROCEDURE — 99214 OFFICE O/P EST MOD 30 MIN: CPT | Mod: 25 | Performed by: NURSE PRACTITIONER

## 2023-10-05 RX ORDER — CYCLOBENZAPRINE HCL 10 MG
5-10 TABLET ORAL 2 TIMES DAILY PRN
Qty: 60 TABLET | Refills: 0 | Status: SHIPPED | OUTPATIENT
Start: 2023-10-05 | End: 2023-11-06

## 2023-10-05 RX ORDER — NAPROXEN 500 MG/1
500 TABLET ORAL 2 TIMES DAILY WITH MEALS
Qty: 120 TABLET | Refills: 0 | Status: SHIPPED | OUTPATIENT
Start: 2023-10-05 | End: 2023-12-04

## 2023-10-05 RX ORDER — ALPRAZOLAM 0.5 MG
0.5 TABLET ORAL 2 TIMES DAILY PRN
Qty: 180 TABLET | Refills: 5 | Status: SHIPPED | OUTPATIENT
Start: 2023-10-05 | End: 2024-04-17

## 2023-10-05 ASSESSMENT — ENCOUNTER SYMPTOMS: BACK PAIN: 1

## 2023-10-05 ASSESSMENT — PAIN SCALES - GENERAL: PAINLEVEL: MILD PAIN (2)

## 2023-10-05 NOTE — PROGRESS NOTES
"  Assessment & Plan   Problem List Items Addressed This Visit          Behavioral    Other specified anxiety disorders    Relevant Medications    ALPRAZolam (XANAX) 0.5 MG tablet     Other Visit Diagnoses       Chronic bilateral low back pain without sciatica    -  Primary    Relevant Medications    naproxen (NAPROSYN) 500 MG tablet    cyclobenzaprine (FLEXERIL) 10 MG tablet    Other Relevant Orders    XR Lumbar Spine 2/3 Views (Completed)    Occupational Medicine Referral    Rib pain on right side        Relevant Medications    naproxen (NAPROSYN) 500 MG tablet    cyclobenzaprine (FLEXERIL) 10 MG tablet    Other Relevant Orders    XR Ribs & Chest Rt 3vw (Completed)    Occupational Medicine Referral    Actinic keratosis        Relevant Orders    DESTRUCT BENIGN LESION, UP TO 14 (Completed)    Muscle spasm        Relevant Medications    cyclobenzaprine (FLEXERIL) 10 MG tablet    DDD (degenerative disc disease), lumbar        Relevant Medications    naproxen (NAPROSYN) 500 MG tablet    cyclobenzaprine (FLEXERIL) 10 MG tablet    Other Relevant Orders    Occupational Medicine Referral           X-ray of lumbar spine and ribs were reviewed.  Degenerative changes to lumbar spine, rib x-ray is normal.  Radiology over read same.  Will treat with naproxen twice daily, Flexeril as needed for muscle spasms.  Referral to occupational medicine for consideration of spine therapy program.  No red flag symptoms were noted on exam or history.    Actinic keratosis to right upper arm, treated with 3 passes of liquid nitrogen and tolerated well.    [facility] reviewed, refill of Xanax x6 months was provided.  Review of the result(s) of each unique test - xray lumbar spine, xray ribs  Ordering of each unique test  Prescription drug management         BMI:   Estimated body mass index is 33.64 kg/m  as calculated from the following:    Height as of this encounter: 1.88 m (6' 2\").    Weight as of this encounter: 118.8 kg (262 lb). "           No follow-ups on file.    ERROL Mcknight CNP  [facility]    Subjective   Jorge L is a 68 year old, presenting for the following health issues:  Back Pain      History of Present Illness       Back Pain:  He presents for follow up of back pain. Patient's back pain is a chronic problem.  Location of back pain:  Right lower back, left lower back, right middle of back, left middle of back, right upper back, left upper back and right shoulder  Description of back pain: burning, dull ache, sharp and shooting  Back pain spreads: right thigh and right shoulder    Since patient first noticed back pain, pain is: gradually worsening  Does back pain interfere with his job:  Yes       Reason for visit:  Back and rib pain. skin growth  Symptom onset:  More than a month  Symptoms include:  Pain lack of mobility  Symptom intensity:  Moderate  Symptom progression:  Staying the same  Had these symptoms before:  No  What makes it worse:  Excercise  What makes it better:  Rest    He eats 2-3 servings of fruits and vegetables daily.He consumes 1 sweetened beverage(s) daily.He exercises with enough effort to increase his heart rate 60 or more minutes per day.  He exercises with enough effort to increase his heart rate 6 days per week. He is missing 1 dose(s) of medications per week.     He presents to clinic today for evaluation of back pain.  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  He reports low back pain, lower thoracic pain, shoulder blades, right hip pain and right rib pain.  The worst symptom is his low back pain.  This does radiate into his right hip and causes some mild numbness.  After he is done working and goes home to rest, he will have radiating pain up to his lower thoracic and shoulder blade region.  This has been going on for several months.  He does take Tylenol and ibuprofen for symptomatic management which does  "seem to help when he is home but not helping when he is working.  He does feel his back is somewhat weak at times.  He will have a sensation of electrical shocks up his spine.  Denies any saddle numbness, no lower extremity weakness, no bowel or bladder dysfunction.  He will work for 3 to 4 days and then feels he needs to take 3 to 4 days off to recover.  Right sided rib pain has been present for several months, no injuries.  No history of back issues.    He also has a lesion to his right lower bicep that is been present for the past 3 to 4 months.  This is raised, rough and light brown in color.  He would like to have this evaluated.    He does take alprazolam 0.5 mg twice daily for anxiety.  He states he notices if he does not take the medication as his anxiety symptoms will significantly worsen.    Review of Systems   Musculoskeletal:  Positive for back pain.    See above        Objective    /84   Pulse 68   Temp 98  F (36.7  C)   Resp 20   Ht 1.88 m (6' 2\")   Wt 118.8 kg (262 lb)   SpO2 96%   BMI 33.64 kg/m    Body mass index is 33.64 kg/m .  Physical Exam   GENERAL: healthy, alert and no distress  EYES: Eyes grossly normal to inspection  RESP: lungs clear to auscultation - no rales, rhonchi or wheezes  CV: regular rate and rhythm, normal S1 S2  MS: no gross musculoskeletal defects noted, tender over right anterior rib cage with palpation.  Tender over lower lumbar spine and paraspinal musculature.  Negative straight leg raise.  Able to stand on toes, heels.  Normal range of motion of lumbar spine.  SKIN: Raised rough lesion with light brown in color measuring approximately 0.5 cm in diameter  NEURO: Normal strength and tone, mentation intact and speech normal  PSYCH: mentation appears normal, affect normal/bright    Results for orders placed or performed during the hospital encounter of 10/05/23   XR Ribs & Chest Rt 3vw     Status: None    Narrative    PROCEDURE: XR RIBS & CHEST RT 3VW 10/5/2023 " 8:23 AM    HISTORY: Rib pain on right side    COMPARISONS: 2018    TECHNIQUE: Chest PA, right RIBS 3 views    FINDINGS: Chest: The lungs are clear of active pulmonary infiltrates.  The heart and pulmonary vessels are within normal limits. There is no  pneumothorax or pleural effusion.    Right RIBS: No right rib fracture or destructive lesion is seen.         Impression    IMPRESSION: Normal right ribs.    No pulmonary infiltrates or pleural effusions.    NORRIS BRYANT MD         SYSTEM ID:  [id number]   Results for orders placed or performed during the hospital encounter of 10/05/23   XR Lumbar Spine 2/3 Views     Status: None    Narrative    PROCEDURE: XR LUMBAR SPINE 2/3 VIEWS 10/5/2023 8:22 AM    HISTORY: Chronic bilateral low back pain without sciatica; Chronic  bilateral low back pain without sciatica    COMPARISONS: None.    TECHNIQUE: AP and lateral    FINDINGS: Lumbar disks are normal in height. Severe facet joint  degenerative changes are noted at L4-L5 and L5-S1 bilaterally the  vertebral bodies and arches are intact. Prevertebral soft tissues  appear normal.         Impression    IMPRESSION: Advanced lower lumbar facet joint degenerative changes    NORRIS BRYANT MD         SYSTEM ID:  [id number]

## 2023-10-05 NOTE — NURSING NOTE
Patient presents today for back and rib pain. Patient also has a skin lesion that he noticed 3-4 months ago.    Medication Reconciliation Complete    Sandra Cortes LPN  10/5/2023 7:45 AM

## 2023-10-18 ENCOUNTER — OFFICE VISIT (OUTPATIENT)
Dept: FAMILY MEDICINE | Facility: OTHER | Age: 68
End: 2023-10-18
Attending: NURSE PRACTITIONER

## 2023-10-18 VITALS
DIASTOLIC BLOOD PRESSURE: 83 MMHG | WEIGHT: 264 LBS | HEART RATE: 75 BPM | OXYGEN SATURATION: 96 % | BODY MASS INDEX: 33.88 KG/M2 | RESPIRATION RATE: 17 BRPM | SYSTOLIC BLOOD PRESSURE: 135 MMHG | HEIGHT: 74 IN

## 2023-10-18 DIAGNOSIS — M51.369 DDD (DEGENERATIVE DISC DISEASE), LUMBAR: ICD-10-CM

## 2023-10-18 DIAGNOSIS — R07.81 RIB PAIN ON RIGHT SIDE: ICD-10-CM

## 2023-10-18 DIAGNOSIS — G89.29 CHRONIC BILATERAL LOW BACK PAIN WITHOUT SCIATICA: ICD-10-CM

## 2023-10-18 DIAGNOSIS — M54.50 CHRONIC BILATERAL LOW BACK PAIN WITHOUT SCIATICA: ICD-10-CM

## 2023-10-18 PROCEDURE — 99207 PR BACK PROGRAM: CPT | Performed by: CHIROPRACTOR

## 2023-10-18 ASSESSMENT — PAIN SCALES - GENERAL: PAINLEVEL: NO PAIN (1)

## 2023-10-18 NOTE — PROGRESS NOTES
CHIEF COMPLAINT:   Chief Complaint   Patient presents with    Consult       HISTORY OF PRESENTING INJURY     Jorge L is a new patient to me sent to me by his primary, Jade Quinn, for consideration of our spine program for ongoing mid thoracic and low back complaints.  These are chronic problems for him and he has never tried any formal therapy.  Most concerning to him as his right anterior thoracic cage, pointing to approximately the eighth through 10th rib regions, and his lumbar spine which is aggravated by excessive standing and walking.  Sitting relieves his low back.  He is currently working as a  and has a lot of difficulty after some time out in the field.  He denies any bowel or bladder changes or saddle anesthesia, motor loss, or sensory changes in the lower extremities.    Oswestry (SAMY) Questionnaire        10/18/2023    12:28 PM   OSWESTRY DISABILITY INDEX   Count 10   Sum 18   Oswestry Score (%) 36 %        PAST MEDICAL HISTORY:  Past Medical History:   Diagnosis Date    Benign neoplasm     1/09, 2010,w/ severe atypia noted on colonoscopy    Colonic polyp     Erectile dysfunction     History of vasculitis of skin     Hyperlipidemia     Kidney stones     Medullary cystic kidney     No Comments Provided    Personal history of nicotine dependence     No Comments Provided       PAST SURGICAL HISTORY:  Past Surgical History:   Procedure Laterality Date    APPENDECTOMY OPEN      No Comments Provided    AS REMOVAL OF KIDNEY STONE  02/2021    COLONOSCOPY      1/09,follow-up recommended in 6 months    COLONOSCOPY  01/11/2010 01/11/2010,F/U 2015    COLONOSCOPY  03/07/2016    3/7/16,F/U 2021 tubular adenomas    COLONOSCOPY N/A 03/15/2021    F/U 2026 h/o adenomatous polyps    URETHROPLASTY         ALLERGIES:  No Known Allergies    CURRENT MEDICATIONS:  Current Outpatient Medications   Medication Sig Dispense Refill    ALPRAZolam (XANAX) 0.5 MG tablet Take 1 tablet (0.5 mg) by mouth 2 times daily as needed  for anxiety 180 tablet 5    cyclobenzaprine (FLEXERIL) 10 MG tablet Take 0.5-1 tablets (5-10 mg) by mouth 2 times daily as needed for muscle spasms 60 tablet 0    Multiple Vitamin (MULTI-VITAMINS) TABS Take 1 tablet by mouth daily      naproxen (NAPROSYN) 500 MG tablet Take 1 tablet (500 mg) by mouth 2 times daily (with meals) for 60 days 120 tablet 0    rosuvastatin (CRESTOR) 5 MG tablet Take 1 tablet (5 mg) by mouth daily 90 tablet 3    triamcinolone (KENALOG) 0.1 % external ointment Apply topically 2 times daily 30 g 3    vitamin C (ASCORBIC ACID) 500 MG tablet Take 1 tablet (500 mg) by mouth daily         SOCIAL HISTORY:  Social History     Socioeconomic History    Marital status:      Spouse name: Not on file    Number of children: Not on file    Years of education: Not on file    Highest education level: Not on file   Occupational History    Not on file   Tobacco Use    Smoking status: Former     Packs/day: 0.50     Years: 40.00     Additional pack years: 0.00     Total pack years: 20.00     Types: Cigarettes     Quit date: 1/3/2020     Years since quitting: 3.7    Smokeless tobacco: Never   Vaping Use    Vaping Use: Never used   Substance and Sexual Activity    Alcohol use: Yes     Comment: 2-3 times per week    Drug use: Never    Sexual activity: Yes     Partners: Female   Other Topics Concern    Parent/sibling w/ CABG, MI or angioplasty before 65F 55M? Not Asked   Social History Narrative    Travels to work in [place] as a  for 100+ day stretches. Will be gone for 6+ months a year.  , wife works at [employer]. No financial or relationship concerns.     Social Determinants of Health     Financial Resource Strain: Low Risk  (10/5/2023)    Financial Resource Strain     Within the past 12 months, have you or your family members you live with been unable to get utilities (heat, electricity) when it was really needed?: No   Food Insecurity: Low Risk  (10/5/2023)    Food Insecurity     Within the  "past 12 months, did you worry that your food would run out before you got money to buy more?: No     Within the past 12 months, did the food you bought just not last and you didn t have money to get more?: No   Transportation Needs: Low Risk  (10/5/2023)    Transportation Needs     Within the past 12 months, has lack of transportation kept you from medical appointments, getting your medicines, non-medical meetings or appointments, work, or from getting things that you need?: No   Physical Activity: Not on file   Stress: Not on file   Social Connections: Not on file   Interpersonal Safety: Low Risk  (10/18/2023)    Interpersonal Safety     Do you feel physically and emotionally safe where you currently live?: Yes     Within the past 12 months, have you been hit, slapped, kicked or otherwise physically hurt by someone?: No     Within the past 12 months, have you been humiliated or emotionally abused in other ways by your partner or ex-partner?: No   Housing Stability: Low Risk  (10/5/2023)    Housing Stability     Do you have housing? : Yes     Are you worried about losing your housing?: No       FAMILY HISTORY:  Family History   Problem Relation Age of Onset    Emphysema Mother     Arthritis Father     Other - See Comments Sister         Unknown    Other - See Comments Brother         Recovering addict    Coronary Artery Disease Brother 61    Colon Cancer Brother     Coronary Artery Disease Maternal Grandfather         Coronary artery disease,60- lived into 80s- smoked early    Other - See Comments Other         No FHx DM or CAD, no cancers in first degree relatives.       REVIEW OF SYSTEMS:    Unremarkable other than chief complaint      PHYSICAL EXAM:   /83   Pulse 75   Resp 17   Ht 1.88 m (6' 2\")   Wt 119.7 kg (264 lb)   SpO2 96%   BMI 33.90 kg/m   Body mass index is 33.9 kg/m . General Appearance: Pleasant, No acute distress. Patient is ambulatory without assistance. Transitions without difficulty.  " Stance and gait normal, mild forward.  Equal and adequate strength of the lower extremities, +1 bilateral patellar reflexes.  Sensory intact.  Negative supine straight leg raise to 45 degrees bilaterally.  Normal plantar and dorsiflexion against resistance.  Prone assessment: Palpation to the eighth vertebrae is painful to him and there is adjacent spasm on the right along the intercostal muscle between eighth and ninth rib.  Palpation to the L5-S1 disc base is also painful.  He does right-sided gluteal hunching with right SI joint segmental dysfunction    Reviewed and discussed imaging related to this condition: CT imaging of the chest reveals moderate osteophytosis occurring in the thoracic spine on the right.  X-ray imaging of the lumbar spine reveals diminished disc height at L5-S1 with mild associated osteophytosis      IMPRESSION/PLAN:    I would like Jorge L to begin physical therapy with Soham Gutierrezalejandra at frequency of her spine program with main focus of the lumbar spine and modalities and manual trigger point/work to be performed on the thoracic spine.  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  It is my estimation that therapy to his low back will also help with his thoracic spine as well.  He is willing to start the program and I placed a referral to our physical therapy department.  Plan to see him 4 weeks after starting for re-eval.      Total time spent today in chart review/preparation, face to face evaluation, and documentation: 34 minutes.    Note to: ERROL French CNP, DC, TRA, LUC  Director - Occupational Medicine Department  Diplomate of the American Board of Forensic Professionals  Board Certified - American Board of Independent Medical Examiners    1:52 PM 10/18/2023

## 2023-11-01 ENCOUNTER — MYC REFILL (OUTPATIENT)
Dept: FAMILY MEDICINE | Facility: OTHER | Age: 68
End: 2023-11-01
Payer: COMMERCIAL

## 2023-11-01 DIAGNOSIS — M62.838 MUSCLE SPASM: ICD-10-CM

## 2023-11-01 DIAGNOSIS — M51.369 DDD (DEGENERATIVE DISC DISEASE), LUMBAR: ICD-10-CM

## 2023-11-06 ENCOUNTER — MYC MEDICAL ADVICE (OUTPATIENT)
Dept: FAMILY MEDICINE | Facility: OTHER | Age: 68
End: 2023-11-06
Payer: COMMERCIAL

## 2023-11-06 DIAGNOSIS — M62.838 MUSCLE SPASM: ICD-10-CM

## 2023-11-06 DIAGNOSIS — M51.369 DDD (DEGENERATIVE DISC DISEASE), LUMBAR: ICD-10-CM

## 2023-11-06 RX ORDER — CYCLOBENZAPRINE HCL 10 MG
5-10 TABLET ORAL 2 TIMES DAILY PRN
Qty: 60 TABLET | Refills: 0 | Status: SHIPPED | OUTPATIENT
Start: 2023-11-06 | End: 2023-12-12

## 2023-11-06 NOTE — TELEPHONE ENCOUNTER
Requested Prescriptions   Pending Prescriptions Disp Refills    cyclobenzaprine (FLEXERIL) 10 MG tablet 60 tablet 0     Sig: Take 0.5-1 tablets (5-10 mg) by mouth 2 times daily as needed for muscle spasms       There is no refill protocol information for this order          Last Prescription Date:   10/5/2023  Last Fill Qty/Refills:         60, R-0    Last Office Visit:              10/5/2023   Future Office visit:           None    Rebeca Vaca RN on 11/6/2023 at 3:31 PM

## 2023-11-07 RX ORDER — CYCLOBENZAPRINE HCL 10 MG
5-10 TABLET ORAL 2 TIMES DAILY PRN
Qty: 60 TABLET | Refills: 0 | OUTPATIENT
Start: 2023-11-07

## 2023-11-07 NOTE — TELEPHONE ENCOUNTER
Request received from [facility] on 11/1/23. Request respondedto on 11/6/23 refilled #60 with 0 refills will disregard. Michelle Bonner RN ....................  11/7/2023   3:33 PM

## 2023-12-05 ENCOUNTER — THERAPY VISIT (OUTPATIENT)
Dept: PHYSICAL THERAPY | Facility: OTHER | Age: 68
End: 2023-12-05
Attending: CHIROPRACTOR
Payer: COMMERCIAL

## 2023-12-05 DIAGNOSIS — G89.29 CHRONIC BILATERAL LOW BACK PAIN WITHOUT SCIATICA: Primary | ICD-10-CM

## 2023-12-05 DIAGNOSIS — M51.369 DDD (DEGENERATIVE DISC DISEASE), LUMBAR: ICD-10-CM

## 2023-12-05 DIAGNOSIS — R07.81 RIB PAIN ON RIGHT SIDE: ICD-10-CM

## 2023-12-05 DIAGNOSIS — M54.50 CHRONIC BILATERAL LOW BACK PAIN WITHOUT SCIATICA: Primary | ICD-10-CM

## 2023-12-05 PROCEDURE — 97161 PT EVAL LOW COMPLEX 20 MIN: CPT | Mod: GP,PO

## 2023-12-05 PROCEDURE — 97110 THERAPEUTIC EXERCISES: CPT | Mod: GP,PO

## 2023-12-05 NOTE — PROGRESS NOTES
PHYSICAL THERAPY EVALUATION  Type of Visit: Evaluation    See electronic medical record for Abuse and Falls Screening details.    Subjective       Presenting condition or subjective complaint: Back and arm pain  Patient referred to the spine therapy program.  Patient works as a  and reports that activity such as running his chainsaw are limited to 5 minutes at a time due to worsening low back and thoracic pain.  Patient reports activities such as washing his dishes or standing for prolonged periods will worsen the thoracic and low back pain.  Patient reports he is also noted if he coughs or sneezes he will have increased back pain as well.  Patient is currently not working and is hopeful that working through the spine program allow him to return to work without limitations by back pain.  Date of onset: 12/05/23    Relevant medical history: Bladder or bowel problems; Cold or hot arm or leg; Migraines or headaches; Overweight; Progressive neurological deficits; Significant weakness   Dates & types of surgery: Appendix..1988, urithraplasty...2008?, kidney stone removal ...2021    Prior diagnostic imaging/testing results: CT scan; X-ray     Prior therapy history for the same diagnosis, illness or injury: No      Prior Level of Function  No prior functional limitations reported.    Living Environment  Social support: With a significant other or spouse   Type of home: House; 1 level   Stairs to enter the home: No       Ramp: No   Stairs inside the home: No       Help at home: None  Equipment owned:       Employment: No    Hobbies/Interests: Hunting, fishing, cooking    Patient goals for therapy: Work, stay on my feet longer than 5 minutes without pain.         Objective   LUMBAR SPINE EVALUATION  PAIN: Pain Level at Rest: 0/10  Pain Level with Use: 7/10  Pain Location: Low back, and thoracic region.  Pain Quality: Aching, Burning, Dull, Sharp, Shooting, and Stabbing  Pain Frequency: constant  Pain is Exacerbated By:  Walking, standing, lifting, carrying, work, household tasks, bending, ADL tasks, certain positions, coughing, sneezing  Pain is Relieved By: rest and Prescription medication  INTEGUMENTARY (edema, incisions): WNL  POSTURE:  Mild rounded shoulder posture in sitting  GAIT: Normal gait pattern noted.    ROM:   Lumbar range of motion:  Flexion: Fingertips to knee-pain across the low back.  Extension: 20 degrees-limited by pain and stiffness.    Thoracic extension limited.    MYOTOMES:    Left Right   T12-L3 (Hip Flexion) 5 5   L2-4 (Quads)  5 5   L4 (Ankle DF) 5 5   L5 (Great Toe Ext) 5 5   S1 (Toe Raise) 5 5       NEURAL TENSION: SLR test and slump test negative bilaterally.  FLEXIBILITY: Decreased hamstring flexibility bilaterally.  Decreased hip flexor flexibility bilaterally.    PALPATION: Tightness and tenderness to palpation through the thoracic paraspinals T6-T8 bilaterally.  ERS right at T8.  Tightness and tenderness with palpation of the right QL.  Tightness and tenderness with lumbar paraspinals L1-L5 bilaterally.        Assessment & Plan   CLINICAL IMPRESSIONS  Medical Diagnosis: Chronic bilateral low back pain without sciatica (M54.50, G89.29), Rib pain on right side (R07.81),  DDD (degenerative disc disease), lumbar (M51.36)    Treatment Diagnosis: Thoracic back pain, low back pain, impaired lumbar range of motion, impaired thoracic range of motion, core weakness, decreased flexibility   Impression/Assessment: Patient is a 68 year old male with lumbar and thoracic pain complaints.  The following significant findings have been identified: Pain, Decreased ROM/flexibility, Decreased joint mobility, Decreased strength, Impaired muscle performance, Decreased activity tolerance, and Impaired posture. These impairments interfere with their ability to perform self care tasks, work tasks, recreational activities, and household chores as compared to previous level of function.     Clinical Decision Making  (Complexity):  Clinical Presentation: Stable/Uncomplicated  Clinical Presentation Rationale: based on medical and personal factors listed in PT evaluation  Clinical Decision Making (Complexity): Low complexity    PLAN OF CARE  Treatment Interventions:  Modalities: Ultrasound  Interventions: Manual Therapy, Neuromuscular Re-education, Therapeutic Exercise    Long Term Goals     PT Goal 1  Goal Identifier: Thoracic back pain  Goal Description: Patient will report the ability to ride his chainsaw for 1 hour with back pain not exceeding a 2/10 at its worst.  Rationale: to maximize safety and independence with performance of ADLs and functional tasks  Target Date: 01/30/24  PT Goal 2  Goal Identifier: Low back pain  Goal Description: Patient reports the ability to stand and wash dishes at his sink for 15 minutes with low back pain at a 2/10 or less.  Rationale: to maximize safety and independence with performance of ADLs and functional tasks;to maximize safety and independence within the home  Target Date: 01/30/24  PT Goal 3  Goal Identifier: Core weakness  Goal Description: Patient will demonstrate improved core strength to allow improved neuromuscular control and performance of lifting tasks with proper body mechanics.  Rationale: to maximize safety and independence with performance of ADLs and functional tasks;to maximize safety and independence within the home;to maximize safety and independence within the community  Target Date: 01/30/24      Frequency of Treatment: 16 visits  Duration of Treatment: 8 weeks      Education Assessment:   Learner/Method: Patient;Listening;Reading;Demonstration;Pictures/Video;No Barriers to Learning    Risks and benefits of evaluation/treatment have been explained.   Patient/Family/caregiver agrees with Plan of Care.     Evaluation Time:     PT Eval, Low Complexity Minutes (50486): 15       Signing Clinician: Soham Parker PT      [facility]                                                                                    OUTPATIENT PHYSICAL THERAPY      PLAN OF TREATMENT FOR OUTPATIENT REHABILITATION   Patient's Last Name, First Name, Garland Valverde YOB: 1955   Provider's Name   [facility]   Medical Record No.  [id number]     Onset Date: 12/05/23  Start of Care Date: 12/05/23     Medical Diagnosis:  Chronic bilateral low back pain without sciatica (M54.50, G89.29), Rib pain on right side (R07.81),  DDD (degenerative disc disease), lumbar (M51.36)      PT Treatment Diagnosis:  Thoracic back pain, low back pain, impaired lumbar range of motion, impaired thoracic range of motion, core weakness, decreased flexibility Plan of Treatment  Frequency/Duration: 16 visits/ 8 weeks    Certification date from 12/05/23 to 01/30/24         See note for plan of treatment details and functional goals     Soham Parker, PT                         I CERTIFY THE NEED FOR THESE SERVICES FURNISHED UNDER        THIS PLAN OF TREATMENT AND WHILE UNDER MY CARE     (Physician attestation of this document indicates review and certification of the therapy plan).              Referring Provider:  ALEX Queen NP    Initial Assessment  See Epic Evaluation- Start of Care Date: 12/05/23

## 2023-12-06 ENCOUNTER — MYC MEDICAL ADVICE (OUTPATIENT)
Dept: FAMILY MEDICINE | Facility: OTHER | Age: 68
End: 2023-12-06
Payer: COMMERCIAL

## 2023-12-06 DIAGNOSIS — M51.369 DDD (DEGENERATIVE DISC DISEASE), LUMBAR: ICD-10-CM

## 2023-12-06 DIAGNOSIS — M62.838 MUSCLE SPASM: ICD-10-CM

## 2023-12-07 ENCOUNTER — THERAPY VISIT (OUTPATIENT)
Dept: PHYSICAL THERAPY | Facility: OTHER | Age: 68
End: 2023-12-07
Attending: CHIROPRACTOR
Payer: COMMERCIAL

## 2023-12-07 DIAGNOSIS — M51.369 DDD (DEGENERATIVE DISC DISEASE), LUMBAR: ICD-10-CM

## 2023-12-07 DIAGNOSIS — G89.29 CHRONIC BILATERAL LOW BACK PAIN WITHOUT SCIATICA: Primary | ICD-10-CM

## 2023-12-07 DIAGNOSIS — M54.50 CHRONIC BILATERAL LOW BACK PAIN WITHOUT SCIATICA: Primary | ICD-10-CM

## 2023-12-07 DIAGNOSIS — R07.81 RIB PAIN ON RIGHT SIDE: ICD-10-CM

## 2023-12-07 PROCEDURE — 97110 THERAPEUTIC EXERCISES: CPT | Mod: GP,PO

## 2023-12-07 PROCEDURE — 97140 MANUAL THERAPY 1/> REGIONS: CPT | Mod: GP,PO

## 2023-12-11 ENCOUNTER — THERAPY VISIT (OUTPATIENT)
Dept: PHYSICAL THERAPY | Facility: OTHER | Age: 68
End: 2023-12-11
Attending: CHIROPRACTOR
Payer: COMMERCIAL

## 2023-12-11 DIAGNOSIS — G89.29 CHRONIC BILATERAL LOW BACK PAIN WITHOUT SCIATICA: Primary | ICD-10-CM

## 2023-12-11 DIAGNOSIS — M51.369 DDD (DEGENERATIVE DISC DISEASE), LUMBAR: ICD-10-CM

## 2023-12-11 DIAGNOSIS — R07.81 RIB PAIN ON RIGHT SIDE: ICD-10-CM

## 2023-12-11 DIAGNOSIS — M54.50 CHRONIC BILATERAL LOW BACK PAIN WITHOUT SCIATICA: Primary | ICD-10-CM

## 2023-12-11 PROCEDURE — 97140 MANUAL THERAPY 1/> REGIONS: CPT | Mod: GP,PO

## 2023-12-11 PROCEDURE — 97110 THERAPEUTIC EXERCISES: CPT | Mod: GP,PO

## 2023-12-12 RX ORDER — CYCLOBENZAPRINE HCL 10 MG
TABLET ORAL
Qty: 60 TABLET | Refills: 0 | Status: SHIPPED | OUTPATIENT
Start: 2023-12-12 | End: 2024-04-17

## 2023-12-12 NOTE — TELEPHONE ENCOUNTER
Last Prescription Date: 11/06/2023  Last Qty/Refills: 60 / R-0  Last Office Visit: 10/05/2023  Future Office Visit: None     Requested Prescriptions   Pending Prescriptions Disp Refills    cyclobenzaprine (FLEXERIL) 10 MG tablet [Pharmacy Med Name: Cyclobenzaprine HCl 10 MG Oral Tablet] 60 tablet 0     Sig: TAKE 1/2 TO 1 (ONE-HALF TO ONE) TABLET BY MOUTH TWICE DAILY AS NEEDED FOR MUSCLE SPASM       There is no refill protocol information for this order        Routing refill request to provider for review/approval because:  D[place] [facility] [place]       Diana Johnson RN on 12/12/2023 at 10:14 AM

## 2023-12-13 ENCOUNTER — THERAPY VISIT (OUTPATIENT)
Dept: PHYSICAL THERAPY | Facility: OTHER | Age: 68
End: 2023-12-13
Attending: CHIROPRACTOR
Payer: COMMERCIAL

## 2023-12-13 DIAGNOSIS — M54.50 CHRONIC BILATERAL LOW BACK PAIN WITHOUT SCIATICA: Primary | ICD-10-CM

## 2023-12-13 DIAGNOSIS — G89.29 CHRONIC BILATERAL LOW BACK PAIN WITHOUT SCIATICA: Primary | ICD-10-CM

## 2023-12-13 DIAGNOSIS — R07.81 RIB PAIN ON RIGHT SIDE: ICD-10-CM

## 2023-12-13 DIAGNOSIS — M51.369 DDD (DEGENERATIVE DISC DISEASE), LUMBAR: ICD-10-CM

## 2023-12-13 PROCEDURE — 97140 MANUAL THERAPY 1/> REGIONS: CPT | Mod: GP,PO

## 2023-12-20 ENCOUNTER — THERAPY VISIT (OUTPATIENT)
Dept: PHYSICAL THERAPY | Facility: OTHER | Age: 68
End: 2023-12-20
Attending: CHIROPRACTOR
Payer: COMMERCIAL

## 2023-12-20 DIAGNOSIS — G89.29 CHRONIC BILATERAL LOW BACK PAIN WITHOUT SCIATICA: Primary | ICD-10-CM

## 2023-12-20 DIAGNOSIS — R07.81 RIB PAIN ON RIGHT SIDE: ICD-10-CM

## 2023-12-20 DIAGNOSIS — M54.50 CHRONIC BILATERAL LOW BACK PAIN WITHOUT SCIATICA: Primary | ICD-10-CM

## 2023-12-20 DIAGNOSIS — M51.369 DDD (DEGENERATIVE DISC DISEASE), LUMBAR: ICD-10-CM

## 2023-12-20 PROCEDURE — 97032 APPL MODALITY 1+ESTIM EA 15: CPT | Mod: GP,PO

## 2023-12-27 ENCOUNTER — THERAPY VISIT (OUTPATIENT)
Dept: PHYSICAL THERAPY | Facility: OTHER | Age: 68
End: 2023-12-27
Attending: CHIROPRACTOR
Payer: COMMERCIAL

## 2023-12-27 DIAGNOSIS — G89.29 CHRONIC BILATERAL LOW BACK PAIN WITHOUT SCIATICA: Primary | ICD-10-CM

## 2023-12-27 DIAGNOSIS — M54.50 CHRONIC BILATERAL LOW BACK PAIN WITHOUT SCIATICA: Primary | ICD-10-CM

## 2023-12-27 DIAGNOSIS — R07.81 RIB PAIN ON RIGHT SIDE: ICD-10-CM

## 2023-12-27 DIAGNOSIS — M51.369 DDD (DEGENERATIVE DISC DISEASE), LUMBAR: ICD-10-CM

## 2023-12-27 PROCEDURE — 97110 THERAPEUTIC EXERCISES: CPT | Mod: GP,PO

## 2023-12-27 PROCEDURE — 97035 APP MDLTY 1+ULTRASOUND EA 15: CPT | Mod: GP,PO

## 2024-01-04 ENCOUNTER — HOSPITAL ENCOUNTER (OUTPATIENT)
Dept: MRI IMAGING | Facility: OTHER | Age: 69
Discharge: HOME | End: 2024-01-04
Attending: NURSE PRACTITIONER | Admitting: NURSE PRACTITIONER
Payer: COMMERCIAL

## 2024-01-04 DIAGNOSIS — M54.50 CHRONIC BILATERAL LOW BACK PAIN WITHOUT SCIATICA: ICD-10-CM

## 2024-01-04 DIAGNOSIS — G89.29 CHRONIC BILATERAL LOW BACK PAIN WITHOUT SCIATICA: ICD-10-CM

## 2024-01-04 DIAGNOSIS — M51.369 DDD (DEGENERATIVE DISC DISEASE), LUMBAR: ICD-10-CM

## 2024-01-04 PROCEDURE — 72148 MRI LUMBAR SPINE W/O DYE: CPT

## 2024-01-08 ENCOUNTER — OFFICE VISIT (OUTPATIENT)
Dept: FAMILY MEDICINE | Facility: OTHER | Age: 69
End: 2024-01-08
Attending: CHIROPRACTOR
Payer: COMMERCIAL

## 2024-01-08 VITALS
RESPIRATION RATE: 17 BRPM | OXYGEN SATURATION: 95 % | BODY MASS INDEX: 34.91 KG/M2 | HEIGHT: 74 IN | HEART RATE: 80 BPM | DIASTOLIC BLOOD PRESSURE: 82 MMHG | SYSTOLIC BLOOD PRESSURE: 130 MMHG | WEIGHT: 272 LBS

## 2024-01-08 DIAGNOSIS — M54.50 CHRONIC BILATERAL LOW BACK PAIN WITHOUT SCIATICA: Primary | ICD-10-CM

## 2024-01-08 DIAGNOSIS — G89.29 CHRONIC BILATERAL LOW BACK PAIN WITHOUT SCIATICA: Primary | ICD-10-CM

## 2024-01-08 DIAGNOSIS — M51.369 DDD (DEGENERATIVE DISC DISEASE), LUMBAR: ICD-10-CM

## 2024-01-08 PROCEDURE — 99207 PR BACK PROGRAM: CPT | Performed by: CHIROPRACTOR

## 2024-01-08 PROCEDURE — G0463 HOSPITAL OUTPT CLINIC VISIT: HCPCS

## 2024-01-08 ASSESSMENT — PAIN SCALES - GENERAL: PAINLEVEL: MODERATE PAIN (4)

## 2024-01-08 NOTE — PROGRESS NOTES
12/27/23 0722   Appointment Info   Signing clinician's name / credentials Soham Brendaalejandra, PT, OCS   Visits Used 6   Medical Diagnosis Chronic bilateral low back pain without sciatica (M54.50, G89.29), Rib pain on right side (R07.81),  DDD (degenerative disc disease), lumbar (M51.36)   PT Tx Diagnosis Thoracic back pain, low back pain, impaired lumbar range of motion, impaired thoracic range of motion, core weakness, decreased flexibility   Other pertinent information Referred for spine program.   Progress Note/Certification   Start of Care Date 12/05/23   Onset of illness/injury or Date of Surgery 12/05/23   Therapy Frequency 16 visits   Predicted Duration 8 weeks   Certification date from 12/05/23   Certification date to 01/30/24   GOALS   PT Goals 2;3   PT Goal 1   Goal Identifier Thoracic back pain   Goal Description Patient will report the ability to ride his chainsaw for 1 hour with back pain not exceeding a 2/10 at its worst.   Rationale to maximize safety and independence with performance of ADLs and functional tasks   Target Date 01/30/24   PT Goal 2   Goal Identifier Low back pain   Goal Description Patient reports the ability to stand and wash dishes at his sink for 15 minutes with low back pain at a 2/10 or less.   Rationale to maximize safety and independence with performance of ADLs and functional tasks;to maximize safety and independence within the home   Target Date 01/30/24   PT Goal 3   Goal Identifier Core weakness   Goal Description Patient will demonstrate improved core strength to allow improved neuromuscular control and performance of lifting tasks with proper body mechanics.   Rationale to maximize safety and independence with performance of ADLs and functional tasks;to maximize safety and independence within the home;to maximize safety and independence within the community   Target Date 01/30/24   Subjective Report   Subjective Report Patient reports continued symptoms of thoracic and low  "back pain.  He is scheduled for an MRI in 1/4/2024.   PT Modalities   PT Modalities Electrical Stimulation;Ultrasound   Electrical Stimulation   Duration   (20 minutes)   Ultrasound   Ultrasound -Type (does not include 3-5 min prep/cleanup time) Continuous;10 cm sound head   Ultrasound Minutes (31533) 16   Patient Response/Progress Positive response to treatment.   Intensity 1.5 w/cm2   Duration (does not include the 3-5 min set up/clean up time) 16 min   Frequency 1 MHz   Location 8 minutes to the left low thoracic and lumbar paraspinals.  8 minutes to the right lower thoracic and lumbar paraspinals   Positioning prone   Therapeutic Procedure/Exercise   Therapeutic Procedures: strength, endurance, ROM, flexibillity minutes (59327) 15   Ther Proc 1 - Details Supine HS stretch 3 x 30\"B.  Supine PPT with marching x 10 bilaterally.  Supine PPT with opposite arm and leg raise x 10 bilaterally.-Perform once a day or symptoms allow.   Skilled Intervention Education, HEP, range of motion, strengthening   Patient Response/Progress Patient verbalized and demonstrated understanding of home exercise program.   Education   Learner/Method Patient;Listening;Reading;Demonstration;Pictures/Video;No Barriers to Learning   Plan   Home program upine single knee to chest 3 x 30\"B. Supine PPT x10, 5\" hold.-Perform exercises 2 times a day or symptoms allow.  Updated 12/7:Child's pose and child's pose with reach to the side 3 x 30 seconds each, Reach and roll x 3 reps each side, and Supine QL strecth with crossing 1 leg over the other 3x 30\"B-perform 1-2 times daily or as symptoms.  Updated 12/27:Supine HS stretch 3 x 30\"B. Supine PPT with marching x 10 bilaterally. Supine PPT with opposite arm and leg raise x 10 bilaterally.-Perform once a day or symptoms allow.   Plan for next session Progress exercises as symptoms allow.  Manual therapy and modalities as indicated.   Total Session Time   Timed Code Treatment Minutes 31   Total " Treatment Time (sum of timed and untimed services) 31         DISCHARGE  Reason for Discharge: No progress in symptoms. Patient had a follow up with Dr. Galdamez on 1/8/24.  Patient will discontinue the spine program at this time and return to his primary provider for additional diagnostics.     Discharge Plan: Return primary care provider for additional diagnostics.     Referring Provider:  Cornelius Galdamez

## 2024-01-08 NOTE — PROGRESS NOTES
CHIEF COMPLAINT:   Chief Complaint   Patient presents with    Follow Up       HISTORY OF PRESENTING INJURY     Jorge L returns today for his 4-week follow-up since beginning the spine program.  He did obtain MRI imaging last week and is here to review the results.  Jorge L has had 6 sessions of physical therapy thus far with 0% reported improvement, no changes and flexibility, and no changes in strength.  He reports continued symptoms of debilitating pain while upright and walking, again relieved by sitting.  New symptoms of tingling in the penile region and spasm in the rectum are reported today.  He also reports pain traveling down the legs on occasion and approximately 10 minutes into my questioning he reported having increased symptoms while seated.    Oswestry (SAMY) Questionnaire        1/8/2024     7:32 AM   OSWESTRY DISABILITY INDEX   Count 6   Sum 6   Oswestry Score (%) 20 %        PAST MEDICAL HISTORY:  Past Medical History:   Diagnosis Date    Benign neoplasm     1/09, 2010,w/ severe atypia noted on colonoscopy    Colonic polyp     Erectile dysfunction     History of vasculitis of skin     Hyperlipidemia     Kidney stones     Medullary cystic kidney     No Comments Provided    Personal history of nicotine dependence     No Comments Provided       PAST SURGICAL HISTORY:  Past Surgical History:   Procedure Laterality Date    APPENDECTOMY OPEN      No Comments Provided    AS REMOVAL OF KIDNEY STONE  02/2021    COLONOSCOPY      1/09,follow-up recommended in 6 months    COLONOSCOPY  01/11/2010 01/11/2010,F/U 2015    COLONOSCOPY  03/07/2016    3/7/16,F/U 2021 tubular adenomas    COLONOSCOPY N/A 03/15/2021    F/U 2026 h/o adenomatous polyps    URETHROPLASTY         ALLERGIES:  No Known Allergies    CURRENT MEDICATIONS:  Current Outpatient Medications   Medication Sig Dispense Refill    ALPRAZolam (XANAX) 0.5 MG tablet Take 1 tablet (0.5 mg) by mouth 2 times daily as needed for anxiety 180 tablet 5     cyclobenzaprine (FLEXERIL) 10 MG tablet TAKE 1/2 TO 1 (ONE-HALF TO ONE) TABLET BY MOUTH TWICE DAILY AS NEEDED FOR MUSCLE SPASM 60 tablet 0    Multiple Vitamin (MULTI-VITAMINS) TABS Take 1 tablet by mouth daily      rosuvastatin (CRESTOR) 5 MG tablet Take 1 tablet (5 mg) by mouth daily 90 tablet 3    triamcinolone (KENALOG) 0.1 % external ointment Apply topically 2 times daily 30 g 3    vitamin C (ASCORBIC ACID) 500 MG tablet Take 1 tablet (500 mg) by mouth daily         SOCIAL HISTORY:  Social History     Socioeconomic History    Marital status:      Spouse name: Not on file    Number of children: Not on file    Years of education: Not on file    Highest education level: Not on file   Occupational History    Not on file   Tobacco Use    Smoking status: Former     Packs/day: 0.50     Years: 40.00     Additional pack years: 0.00     Total pack years: 20.00     Types: Cigarettes     Quit date: 1/3/2020     Years since quittin.0    Smokeless tobacco: Never   Vaping Use    Vaping Use: Never used   Substance and Sexual Activity    Alcohol use: Yes     Comment: 2-3 times per week    Drug use: Never    Sexual activity: Yes     Partners: Female   Other Topics Concern    Parent/sibling w/ CABG, MI or angioplasty before 65F 55M? Not Asked   Social History Narrative    Travels to work in [place] as a  for 100+ day stretches. Will be gone for 6+ months a year.  , wife works at [employer]. No financial or relationship concerns.     Social Determinants of Health     Financial Resource Strain: Low Risk  (2024)    Financial Resource Strain     Within the past 12 months, have you or your family members you live with been unable to get utilities (heat, electricity) when it was really needed?: No   Food Insecurity: Low Risk  (2024)    Food Insecurity     Within the past 12 months, did you worry that your food would run out before you got money to buy more?: No     Within the past 12 months, did the food you  "bought just not last and you didn t have money to get more?: No   Transportation Needs: Low Risk  (1/8/2024)    Transportation Needs     Within the past 12 months, has lack of transportation kept you from medical appointments, getting your medicines, non-medical meetings or appointments, work, or from getting things that you need?: No   Physical Activity: Not on file   Stress: Not on file   Social Connections: Not on file   Interpersonal Safety: Low Risk  (1/8/2024)    Interpersonal Safety     Do you feel physically and emotionally safe where you currently live?: Yes     Within the past 12 months, have you been hit, slapped, kicked or otherwise physically hurt by someone?: No     Within the past 12 months, have you been humiliated or emotionally abused in other ways by your partner or ex-partner?: No   Housing Stability: Low Risk  (1/8/2024)    Housing Stability     Do you have housing? : Yes     Are you worried about losing your housing?: No       FAMILY HISTORY:  Family History   Problem Relation Age of Onset    Emphysema Mother     Arthritis Father     Other - See Comments Sister         Unknown    Other - See Comments Brother         Recovering addict    Coronary Artery Disease Brother 61    Colon Cancer Brother     Coronary Artery Disease Maternal Grandfather         Coronary artery disease,60- lived into 80s- smoked early    Other - See Comments Other         No FHx DM or CAD, no cancers in first degree relatives.       REVIEW OF SYSTEMS:    Unremarkable other than chief complaint      PHYSICAL EXAM:   /82   Pulse 80   Resp 17   Ht 1.88 m (6' 2\")   Wt 123.4 kg (272 lb)   SpO2 95%   BMI 34.92 kg/m   Body mass index is 34.92 kg/m . General Appearance: Cooperative, appropriate responses.  He demonstrates no acute distress while sitting during my questioning, negative Minor sign, normal gait with no antalgia.    MRI performed January 4, 2024.  Reviewed and discussed findings:    Study " Result    Narrative & Impression   PROCEDURE: MR LUMBAR SPINE W/O CONTRAST 1/4/2024 5:08 PM     HISTORY: Chronic bilateral low back pain without sciatica; Chronic  bilateral low back pain without sciatica; DDD (degenerative disc  disease), lumbar     COMPARISONS: None.     Meds/Dose Given:     TECHNIQUE: Images were obtained sagittally T1 STIR and T2-weighted.  Images were obtained axially T2 weighted     FINDINGS: The T11-T12 and T12-L1 discs appear normal. 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     The L1-L2 and L2-L3 disks are normal.     The L3-L4 disc appears normal.     The L4-L5 disc appears normal. Moderate facet joint degenerative  changes are noted.     The L5-S1 disc is normal. Moderate facet joint degenerative changes  are noted.     Durally the nerves of the cauda equina and conus are normal.     The paravertebral soft tissues appear normal.                                                                        IMPRESSION: Moderate facet joint degenerative changes L4-L5 and L5-S1  bilaterally.     No nerve root compressions are seen.     NORRIS BRYANT MD             IMPRESSION/PLAN:    Patient elects to stop physical therapy at this time due to no improvement.  His MRI does not exhibit objective findings that correlate with his subjective complaints.  And, at the very least, he should have had some improvement with the therapy that I reviewed today.  Therefore, I am referring him back to his PCP for further diagnostic consideration.      Note to: Jade Quinn    Total time spent today in chart review/preparation, face to face evaluation, and documentation: 34 minutes.        Cornelius Galdamez DC, TRA, LUC  Director - Occupational Medicine Department  Diplomate of the American Board of Forensic Professionals  Board Certified - American Board of Independent Medical Examiners    8:24 AM 1/8/2024

## 2024-01-23 ENCOUNTER — OFFICE VISIT (OUTPATIENT)
Dept: FAMILY MEDICINE | Facility: OTHER | Age: 69
End: 2024-01-23
Attending: CHIROPRACTOR
Payer: COMMERCIAL

## 2024-01-23 VITALS
SYSTOLIC BLOOD PRESSURE: 142 MMHG | DIASTOLIC BLOOD PRESSURE: 84 MMHG | BODY MASS INDEX: 36.01 KG/M2 | HEIGHT: 74 IN | RESPIRATION RATE: 18 BRPM | HEART RATE: 90 BPM | WEIGHT: 280.6 LBS | OXYGEN SATURATION: 96 %

## 2024-01-23 DIAGNOSIS — M54.15 RADICULOPATHY, THORACOLUMBAR REGION: ICD-10-CM

## 2024-01-23 DIAGNOSIS — M54.50 CHRONIC BILATERAL LOW BACK PAIN WITHOUT SCIATICA: Primary | ICD-10-CM

## 2024-01-23 DIAGNOSIS — M51.369 DDD (DEGENERATIVE DISC DISEASE), LUMBAR: ICD-10-CM

## 2024-01-23 DIAGNOSIS — G89.29 CHRONIC BILATERAL LOW BACK PAIN WITHOUT SCIATICA: Primary | ICD-10-CM

## 2024-01-23 PROCEDURE — 99213 OFFICE O/P EST LOW 20 MIN: CPT | Performed by: NURSE PRACTITIONER

## 2024-01-23 PROCEDURE — G0463 HOSPITAL OUTPT CLINIC VISIT: HCPCS

## 2024-01-23 ASSESSMENT — PAIN SCALES - GENERAL: PAINLEVEL: NO PAIN (1)

## 2024-01-23 NOTE — NURSING NOTE
Patient presents today for follow up on back pain.    Medication Reconciliation Complete    Sandra Cortes LPN  1/23/2024 9:14 AM

## 2024-01-23 NOTE — PROGRESS NOTES
"  Assessment & Plan   Problem List Items Addressed This Visit          Nervous and Auditory    Chronic bilateral low back pain without sciatica - Primary    Relevant Orders    XR Lumbar Epidural Injection Incl Imaging       Musculoskeletal and Integumentary    DDD (degenerative disc disease), lumbar    Relevant Orders    XR Lumbar Epidural Injection Incl Imaging     Other Visit Diagnoses       Radiculopathy, thoracolumbar region        Relevant Orders    XR Lumbar Epidural Injection Incl Imaging           Chronic low back pain with radicular symptoms because manage degenerative disc disease.  Discussed referral to spine specialist, injections versus others.  At this time he would like to move forward with an injection if possible.  Orders placed.  If referral to spine specialist needed, he would prefer to go to [place].    Blood pressure mildly elevated in office today, home blood pressure readings have been normal.  Review of the result(s) of each unique test - MRI       BMI  Estimated body mass index is 36.03 kg/m  as calculated from the following:    Height as of this encounter: 1.88 m (6' 2\").    Weight as of this encounter: 127.3 kg (280 lb 9.6 oz).           No follow-ups on file.      Nichole Coats is a 68 year old, presenting for the following health issues:  RECHECK (Back pain)    History of Present Illness       Back Pain:  He presents for follow up of back pain. Patient's back pain is a chronic problem.  Location of back pain:  Right lower back, left lower back, right middle of back, left middle of back, right shoulder, right hip and left hip  Description of back pain: burning, gnawing, sharp and stabbing  Back pain spreads: right thigh and left thigh    Since patient first noticed back pain, pain is: gradually worsening  Does back pain interfere with his job:  Yes       He eats 2-3 servings of fruits and vegetables daily.He consumes 1 sweetened beverage(s) daily.He exercises with enough effort to " "increase his heart rate 60 or more minutes per day.  He exercises with enough effort to increase his heart rate 5 days per week.   He is taking medications regularly.     He presents to clinic today for follow-up on low back pain.  He has had chronic low back pain with degenerative disc disease as well as pain to thoracolumbar region.  He has been doing physical therapy which aggravated his pain, occupational medicine referred him back to primary care.  He has been unable to work, works as a , due to the pain.  MRI was reviewed recently completed which showed moderate facet joint degenerative changes as well as a benign nerve root sleeve cyst at the T12 nerve root on the left.  He is having radicular symptoms, pain seems to be gradually worsening.        Review of Systems  See above      Objective    BP (!) 148/96   Pulse 90   Resp 18   Ht 1.88 m (6' 2\")   Wt 127.3 kg (280 lb 9.6 oz)   SpO2 96%   BMI 36.03 kg/m    Body mass index is 36.03 kg/m .  Physical Exam   GENERAL: alert and no distress  EYES: Eyes grossly normal to inspection  MS: no gross musculoskeletal defects noted, unable to reproduce pain with palpation.  Normal gait.  NEURO: Normal strength and tone, mentation intact and speech normal  PSYCH: mentation appears normal, affect normal/bright  LYMPH: no cervical, supraclavicular, axillary, or inguinal adenopathy            Signed Electronically by: ERROL Mcknight CNP    "

## 2024-01-24 ENCOUNTER — HOSPITAL ENCOUNTER (OUTPATIENT)
Dept: GENERAL RADIOLOGY | Facility: OTHER | Age: 69
Discharge: HOME | End: 2024-01-24
Attending: NURSE PRACTITIONER | Admitting: NURSE PRACTITIONER
Payer: COMMERCIAL

## 2024-01-24 DIAGNOSIS — M54.15 RADICULOPATHY, THORACOLUMBAR REGION: ICD-10-CM

## 2024-01-24 DIAGNOSIS — M54.50 CHRONIC BILATERAL LOW BACK PAIN WITHOUT SCIATICA: ICD-10-CM

## 2024-01-24 DIAGNOSIS — M51.369 DDD (DEGENERATIVE DISC DISEASE), LUMBAR: ICD-10-CM

## 2024-01-24 DIAGNOSIS — G89.29 CHRONIC BILATERAL LOW BACK PAIN WITHOUT SCIATICA: ICD-10-CM

## 2024-01-24 PROCEDURE — 255N000002 HC RX 255 OP 636: Performed by: RADIOLOGY

## 2024-01-24 PROCEDURE — 250N000011 HC RX IP 250 OP 636: Performed by: RADIOLOGY

## 2024-01-24 PROCEDURE — 250N000009 HC RX 250: Performed by: RADIOLOGY

## 2024-01-24 PROCEDURE — 62323 NJX INTERLAMINAR LMBR/SAC: CPT

## 2024-01-24 RX ORDER — DEXAMETHASONE SODIUM PHOSPHATE 10 MG/ML
10 INJECTION, SOLUTION INTRAMUSCULAR; INTRAVENOUS ONCE
Status: COMPLETED | OUTPATIENT
Start: 2024-01-24 | End: 2024-01-24

## 2024-01-24 RX ORDER — LIDOCAINE HYDROCHLORIDE 10 MG/ML
2 INJECTION, SOLUTION EPIDURAL; INFILTRATION; INTRACAUDAL; PERINEURAL ONCE
Status: COMPLETED | OUTPATIENT
Start: 2024-01-24 | End: 2024-01-24

## 2024-01-24 RX ORDER — LIDOCAINE HYDROCHLORIDE 10 MG/ML
20 INJECTION, SOLUTION INFILTRATION; PERINEURAL ONCE
Status: COMPLETED | OUTPATIENT
Start: 2024-01-24 | End: 2024-01-24

## 2024-01-24 RX ADMIN — DEXAMETHASONE SODIUM PHOSPHATE 10 MG: 10 INJECTION INTRAMUSCULAR; INTRAVENOUS at 12:43

## 2024-01-24 RX ADMIN — LIDOCAINE HYDROCHLORIDE 2 ML: 10 INJECTION, SOLUTION EPIDURAL; INFILTRATION; INTRACAUDAL; PERINEURAL at 12:43

## 2024-01-24 RX ADMIN — IOHEXOL 2 ML: 240 INJECTION, SOLUTION INTRATHECAL; INTRAVASCULAR; INTRAVENOUS; ORAL at 12:42

## 2024-01-24 RX ADMIN — LIDOCAINE HYDROCHLORIDE 5 ML: 10 INJECTION, SOLUTION INFILTRATION; PERINEURAL at 12:42

## 2024-02-12 ENCOUNTER — MYC MEDICAL ADVICE (OUTPATIENT)
Dept: FAMILY MEDICINE | Facility: OTHER | Age: 69
End: 2024-02-12
Payer: COMMERCIAL

## 2024-03-04 ENCOUNTER — PATIENT OUTREACH (OUTPATIENT)
Dept: GASTROENTEROLOGY | Facility: CLINIC | Age: 69
End: 2024-03-04
Payer: COMMERCIAL

## 2024-03-05 ENCOUNTER — OFFICE VISIT (OUTPATIENT)
Dept: FAMILY MEDICINE | Facility: OTHER | Age: 69
End: 2024-03-05
Attending: NURSE PRACTITIONER
Payer: COMMERCIAL

## 2024-03-05 ENCOUNTER — MYC MEDICAL ADVICE (OUTPATIENT)
Dept: FAMILY MEDICINE | Facility: OTHER | Age: 69
End: 2024-03-05
Payer: COMMERCIAL

## 2024-03-05 VITALS
TEMPERATURE: 97.6 F | OXYGEN SATURATION: 96 % | SYSTOLIC BLOOD PRESSURE: 138 MMHG | DIASTOLIC BLOOD PRESSURE: 86 MMHG | WEIGHT: 286.6 LBS | HEART RATE: 78 BPM | RESPIRATION RATE: 20 BRPM | BODY MASS INDEX: 36.8 KG/M2

## 2024-03-05 DIAGNOSIS — G89.29 CHRONIC BILATERAL THORACIC BACK PAIN: Primary | ICD-10-CM

## 2024-03-05 DIAGNOSIS — M51.369 DDD (DEGENERATIVE DISC DISEASE), LUMBAR: ICD-10-CM

## 2024-03-05 DIAGNOSIS — M54.6 CHRONIC BILATERAL THORACIC BACK PAIN: Primary | ICD-10-CM

## 2024-03-05 PROCEDURE — 99213 OFFICE O/P EST LOW 20 MIN: CPT | Performed by: NURSE PRACTITIONER

## 2024-03-05 PROCEDURE — G0463 HOSPITAL OUTPT CLINIC VISIT: HCPCS

## 2024-03-05 ASSESSMENT — PAIN SCALES - GENERAL: PAINLEVEL: NO PAIN (0)

## 2024-03-05 NOTE — PROGRESS NOTES
"  Assessment & Plan   Problem List Items Addressed This Visit          Musculoskeletal and Integumentary    DDD (degenerative disc disease), lumbar    Relevant Orders    Orthopedic  Referral     Other Visit Diagnoses       Chronic bilateral thoracic back pain    -  Primary    Relevant Orders    MR Thoracic Spine w/o Contrast (Completed)    Orthopedic  Referral        Chronic thoracic and lumbar pain, degenerative changes noted.  Has done physical therapy without relief.  MRI of thoracic spine ordered.  Referral to spine specialist for further evaluation and management.    Ordering of each unique test       BMI  Estimated body mass index is 36.8 kg/m  as calculated from the following:    Height as of 1/23/24: 1.88 m (6' 2\").    Weight as of this encounter: 130 kg (286 lb 9.6 oz).           No follow-ups on file.      Nichole Coats is a 69 year old, presenting for the following health issues:  RECHECK (Back pain)    History of Present Illness       Back Pain:  He presents for follow up of back pain. Patient's back pain is a chronic problem.  Location of back pain:  Right lower back, left lower back, right middle of back, right upper back, right side of neck, right shoulder, right hip, right side of waist and other  Description of back pain: burning, cramping, dull ache, fullness, gnawing, sharp, shooting and stabbing  Back pain spreads: right thigh    Since patient first noticed back pain, pain is: always present, but gets better and worse  Does back pain interfere with his job:  Yes       He eats 2-3 servings of fruits and vegetables daily.He consumes 1 sweetened beverage(s) daily.He exercises with enough effort to increase his heart rate 30 to 60 minutes per day.  He exercises with enough effort to increase his heart rate 6 days per week.   He is taking medications regularly.     He presents to clinic today to discuss thoracic back pain.  He has had his lumbar spine evaluated thoroughly, " physical therapy was not helpful.  Has had injections which helped for short time and then pain has returned.  He reports his pain is that heat, tingling and soreness.  He has pain in his mid thoracic region that is more of a pain per his report.  He is a  and has not been able to work, he has increased pain with extending his arms, bending over and using his chainsaw.  He reports the pain radiates around the front of his chest but does not go up or down his spine.          Review of Systems  See above      Objective    /86   Pulse 78   Temp 97.6  F (36.4  C)   Resp 20   Wt 130 kg (286 lb 9.6 oz)   SpO2 96%   BMI 36.80 kg/m    Body mass index is 36.8 kg/m .  Physical Exam   GENERAL: alert and no distress  MS: no gross musculoskeletal defects noted, tender over mid thoracic region with palpation, normal range of motion.  PSYCH: mentation appears normal, affect normal/bright            Signed Electronically by: ERROL Mcknight CNP

## 2024-03-05 NOTE — NURSING NOTE
Patient presents today for follow up on his back pain after physical therapy.    Medication Reconciliation Complete    Sandra Cortes LPN  3/5/2024 7:47 AM

## 2024-03-06 ENCOUNTER — HOSPITAL ENCOUNTER (OUTPATIENT)
Dept: MRI IMAGING | Facility: OTHER | Age: 69
Discharge: HOME | End: 2024-03-06
Attending: NURSE PRACTITIONER | Admitting: NURSE PRACTITIONER
Payer: COMMERCIAL

## 2024-03-06 DIAGNOSIS — M54.6 CHRONIC BILATERAL THORACIC BACK PAIN: ICD-10-CM

## 2024-03-06 DIAGNOSIS — G89.29 CHRONIC BILATERAL THORACIC BACK PAIN: ICD-10-CM

## 2024-03-06 PROCEDURE — 72146 MRI CHEST SPINE W/O DYE: CPT

## 2024-03-25 DIAGNOSIS — E78.5 HYPERLIPIDEMIA, UNSPECIFIED HYPERLIPIDEMIA TYPE: ICD-10-CM

## 2024-03-26 RX ORDER — ROSUVASTATIN CALCIUM 5 MG/1
5 TABLET, COATED ORAL DAILY
Qty: 90 TABLET | Refills: 0 | Status: SHIPPED | OUTPATIENT
Start: 2024-03-26 | End: 2024-04-17

## 2024-03-26 NOTE — TELEPHONE ENCOUNTER
[id number] sent Rx request for the following:      Requested Prescriptions   Pending Prescriptions Disp Refills    rosuvastatin (CRESTOR) 5 MG tablet 90 tablet 3     Sig: Take 1 tablet (5 mg) by mouth daily       Antihyperlipidemic agents Failed - 3/26/2024  4:11 PM        Failed - LDL on file in the past 12 months          Last Prescription Date:   1/19/23  Last Fill Qty/Refills:         90, R-3    Last Office Visit:              3/5/24   Future Office visit:           none    Routing refill request to provider for review/approval because:  Labs not current:  LDL    Agnieszka Villanueva RN on 3/26/2024 at 4:12 PM

## 2024-04-17 ENCOUNTER — OFFICE VISIT (OUTPATIENT)
Dept: FAMILY MEDICINE | Facility: OTHER | Age: 69
End: 2024-04-17
Attending: NURSE PRACTITIONER
Payer: COMMERCIAL

## 2024-04-17 VITALS
HEART RATE: 80 BPM | HEIGHT: 74 IN | OXYGEN SATURATION: 95 % | DIASTOLIC BLOOD PRESSURE: 82 MMHG | WEIGHT: 290 LBS | SYSTOLIC BLOOD PRESSURE: 144 MMHG | BODY MASS INDEX: 37.22 KG/M2 | RESPIRATION RATE: 20 BRPM

## 2024-04-17 DIAGNOSIS — H61.22 IMPACTED CERUMEN OF LEFT EAR: Primary | ICD-10-CM

## 2024-04-17 DIAGNOSIS — H57.89 EYE REDNESS: ICD-10-CM

## 2024-04-17 DIAGNOSIS — M51.34 DDD (DEGENERATIVE DISC DISEASE), THORACIC: ICD-10-CM

## 2024-04-17 DIAGNOSIS — F41.8 OTHER SPECIFIED ANXIETY DISORDERS: ICD-10-CM

## 2024-04-17 DIAGNOSIS — E66.812 CLASS 2 SEVERE OBESITY DUE TO EXCESS CALORIES WITH SERIOUS COMORBIDITY AND BODY MASS INDEX (BMI) OF 37.0 TO 37.9 IN ADULT (H): ICD-10-CM

## 2024-04-17 DIAGNOSIS — R06.81 APNEA: ICD-10-CM

## 2024-04-17 DIAGNOSIS — Z87.891 PERSONAL HISTORY OF TOBACCO USE: ICD-10-CM

## 2024-04-17 DIAGNOSIS — Z12.5 SCREENING FOR PROSTATE CANCER: ICD-10-CM

## 2024-04-17 DIAGNOSIS — Z00.00 ENCOUNTER FOR MEDICARE ANNUAL WELLNESS EXAM: ICD-10-CM

## 2024-04-17 DIAGNOSIS — E78.5 HYPERLIPIDEMIA, UNSPECIFIED HYPERLIPIDEMIA TYPE: ICD-10-CM

## 2024-04-17 DIAGNOSIS — E66.01 CLASS 2 SEVERE OBESITY DUE TO EXCESS CALORIES WITH SERIOUS COMORBIDITY AND BODY MASS INDEX (BMI) OF 37.0 TO 37.9 IN ADULT (H): ICD-10-CM

## 2024-04-17 LAB
ALBUMIN SERPL BCG-MCNC: 4.5 G/DL (ref 3.5–5.2)
ALP SERPL-CCNC: 82 U/L (ref 40–150)
ALT SERPL W P-5'-P-CCNC: 39 U/L (ref 0–70)
ANION GAP SERPL CALCULATED.3IONS-SCNC: 12 MMOL/L (ref 7–15)
AST SERPL W P-5'-P-CCNC: 30 U/L (ref 0–45)
BILIRUB SERPL-MCNC: 0.3 MG/DL
BUN SERPL-MCNC: 16.5 MG/DL (ref 8–23)
CALCIUM SERPL-MCNC: 9.5 MG/DL (ref 8.8–10.2)
CHLORIDE SERPL-SCNC: 106 MMOL/L (ref 98–107)
CHOLEST SERPL-MCNC: 137 MG/DL
CREAT SERPL-MCNC: 0.88 MG/DL (ref 0.67–1.17)
DEPRECATED HCO3 PLAS-SCNC: 23 MMOL/L (ref 22–29)
EGFRCR SERPLBLD CKD-EPI 2021: >90 ML/MIN/1.73M2
FASTING STATUS PATIENT QL REPORTED: NO
GLUCOSE SERPL-MCNC: 112 MG/DL (ref 70–99)
HDLC SERPL-MCNC: 41 MG/DL
LDLC SERPL CALC-MCNC: 65 MG/DL
NONHDLC SERPL-MCNC: 96 MG/DL
POTASSIUM SERPL-SCNC: 4.7 MMOL/L (ref 3.4–5.3)
PROT SERPL-MCNC: 7.3 G/DL (ref 6.4–8.3)
PSA SERPL DL<=0.01 NG/ML-MCNC: 0.5 NG/ML (ref 0–4.5)
SODIUM SERPL-SCNC: 141 MMOL/L (ref 135–145)
TRIGL SERPL-MCNC: 154 MG/DL

## 2024-04-17 PROCEDURE — 36415 COLL VENOUS BLD VENIPUNCTURE: CPT | Mod: ZL | Performed by: NURSE PRACTITIONER

## 2024-04-17 PROCEDURE — G0103 PSA SCREENING: HCPCS | Mod: ZL | Performed by: NURSE PRACTITIONER

## 2024-04-17 PROCEDURE — 80061 LIPID PANEL: CPT | Mod: ZL | Performed by: NURSE PRACTITIONER

## 2024-04-17 PROCEDURE — 69209 REMOVE IMPACTED EAR WAX UNI: CPT | Performed by: NURSE PRACTITIONER

## 2024-04-17 PROCEDURE — G0463 HOSPITAL OUTPT CLINIC VISIT: HCPCS

## 2024-04-17 PROCEDURE — G0296 VISIT TO DETERM LDCT ELIG: HCPCS | Performed by: NURSE PRACTITIONER

## 2024-04-17 PROCEDURE — 99214 OFFICE O/P EST MOD 30 MIN: CPT | Mod: 25 | Performed by: NURSE PRACTITIONER

## 2024-04-17 PROCEDURE — 80053 COMPREHEN METABOLIC PANEL: CPT | Mod: ZL | Performed by: NURSE PRACTITIONER

## 2024-04-17 PROCEDURE — G0439 PPPS, SUBSEQ VISIT: HCPCS | Performed by: NURSE PRACTITIONER

## 2024-04-17 RX ORDER — HYDROCODONE BITARTRATE AND ACETAMINOPHEN 5; 325 MG/1; MG/1
1 TABLET ORAL EVERY 6 HOURS PRN
Qty: 40 TABLET | Refills: 0 | Status: SHIPPED | OUTPATIENT
Start: 2024-04-17 | End: 2024-04-30

## 2024-04-17 RX ORDER — ROSUVASTATIN CALCIUM 5 MG/1
5 TABLET, COATED ORAL DAILY
Qty: 90 TABLET | Refills: 0 | Status: SHIPPED | OUTPATIENT
Start: 2024-04-17

## 2024-04-17 RX ORDER — ALPRAZOLAM 0.5 MG
0.5 TABLET ORAL 2 TIMES DAILY PRN
Qty: 60 TABLET | Refills: 0 | Status: SHIPPED | OUTPATIENT
Start: 2024-04-17 | End: 2024-05-17

## 2024-04-17 SDOH — HEALTH STABILITY: PHYSICAL HEALTH: ON AVERAGE, HOW MANY DAYS PER WEEK DO YOU ENGAGE IN MODERATE TO STRENUOUS EXERCISE (LIKE A BRISK WALK)?: 4 DAYS

## 2024-04-17 ASSESSMENT — PAIN SCALES - GENERAL: PAINLEVEL: MODERATE PAIN (4)

## 2024-04-17 ASSESSMENT — SOCIAL DETERMINANTS OF HEALTH (SDOH): HOW OFTEN DO YOU GET TOGETHER WITH FRIENDS OR RELATIVES?: THREE TIMES A WEEK

## 2024-04-17 NOTE — NURSING NOTE
Patient presents today for annual medicare wellness visit.    Medication Reconciliation Complete    Sandra Cortes LPN  4/17/2024 7:28 AM

## 2024-04-17 NOTE — PATIENT INSTRUCTIONS
Hydrocortisone to ear twice daily as needed  Pataday eye drops-consider follow up with eye doctor    Preventive Care Advice   This is general advice given by our system to help you stay healthy. However, your care team may have specific advice just for you. Please talk to your care team about your preventive care needs.  Nutrition  Eat 5 or more servings of fruits and vegetables each day.  Try wheat bread, brown rice and whole grain pasta (instead of white bread, rice, and pasta).  Get enough calcium and vitamin D. Check the label on foods and aim for 100% of the RDA (recommended daily allowance).  Lifestyle  Exercise at least 150 minutes each week   (30 minutes a day, 5 days a week).  Do muscle strengthening activities 2 days a week. These help control your weight and prevent disease.  No smoking.  Wear sunscreen to prevent skin cancer.  Have a dental exam and cleaning every 6 months.  Yearly exams  See your health care team every year to talk about:  Any changes in your health.  Any medicines your care team has prescribed.  Preventive care, family planning, and ways to prevent chronic diseases.  Shots (vaccines)   HPV shots (up to age 26), if you've never had them before.  Hepatitis B shots (up to age 59), if you've never had them before.  COVID-19 shot: Get this shot when it's due.  Flu shot: Get a flu shot every year.  Tetanus shot: Get a tetanus shot every 10 years.  Pneumococcal, hepatitis A, and RSV shots: Ask your care team if you need these based on your risk.  Shingles shot (for age 50 and up).  General health tests  Diabetes screening:  Starting at age 35, Get screened for diabetes at least every 3 years.  If you are younger than age 35, ask your care team if you should be screened for diabetes.  Cholesterol test: At age 39, start having a cholesterol test every 5 years, or more often if advised.  Bone density scan (DEXA): At age 50, ask your care team if you should have this scan for osteoporosis  (brittle bones).  Hepatitis C: Get tested at least once in your life.  STIs (sexually transmitted infections)  Before age 24: Ask your care team if you should be screened for STIs.  After age 24: Get screened for STIs if you're at risk. You are at risk for STIs (including HIV) if:  You are sexually active with more than one person.  You don't use condoms every time.  You or a partner was diagnosed with a sexually transmitted infection.  If you are at risk for HIV, ask about PrEP medicine to prevent HIV.  Get tested for HIV at least once in your life, whether you are at risk for HIV or not.  Cancer screening tests  Cervical cancer screening: If you have a cervix, begin getting regular cervical cancer screening tests at age 21. Most people who have regular screenings with normal results can stop after age 65. Talk about this with your provider.  Breast cancer scan (mammogram): If you've ever had breasts, begin having regular mammograms starting at age 40. This is a scan to check for breast cancer.  Colon cancer screening: It is important to start screening for colon cancer at age 45.  Have a colonoscopy test every 10 years (or more often if you're at risk) Or, ask your provider about stool tests like a FIT test every year or Cologuard test every 3 years.  To learn more about your testing options, visit: https://[email or web address].pdf.  For help making a decision, visit: https://bit.ly/gr07207.  Prostate cancer screening test: If you have a prostate and are age 55 to 69, ask your provider if you would benefit from a yearly prostate cancer screening test.  Lung cancer screening: If you are a current or former smoker age 50 to 80, ask your care team if ongoing lung cancer screenings are right for you.  For informational purposes only. Not to replace the advice of your health care provider. Copyright   2023 [employer]. All rights reserved. Clinically reviewed by the [facility] Transitions Program.  [employer] [id number] - REV 01/24.    Preventing Falls: Care Instructions  Injuries and health problems such as trouble walking or poor eyesight can increase your risk of falling. So can some medicines. But there are things you can do to help prevent falls. You can exercise to get stronger. You can also arrange your home to make it safer.    Talk to your doctor about the medicines you take. Ask if any of them increase the risk of falls and whether they can be changed or stopped.   Try to exercise regularly. It can help improve your strength and balance. This can help lower your risk of falling.     Practice fall safety and prevention.    Wear low-heeled shoes that fit well and give your feet good support. Talk to your doctor if you have foot problems that make this hard.  Carry a cellphone or wear a medical alert device that you can use to call for help.  Use stepladders instead of chairs to reach high objects. Don't climb if you're at risk for falls. Ask for help, if needed.  Wear the correct eyeglasses, if you need them.    Make your home safer.    Remove rugs, cords, clutter, and furniture from walkways.  Keep your house well lit. Use night-lights in hallways and bathrooms.  Install and use sturdy handrails on stairways.  Wear nonskid footwear, even inside. Don't walk barefoot or in socks without shoes.    Be safe outside.    Use handrails, curb cuts, and ramps whenever possible.  Keep your hands free by using a shoulder bag or backpack.  Try to walk in well-lit areas. Watch out for uneven ground, changes in pavement, and debris.  Be careful in the winter. Walk on the grass or gravel when sidewalks are slippery. Use de-icer on steps and walkways. Add non-slip devices to shoes.    Put grab bars and nonskid mats in your shower or tub and near the toilet. Try to use a shower chair or bath bench when bathing.   Get into a tub or shower by putting in your weaker leg first. Get out with your strong side first. Have a phone  "or medical alert device in the bathroom with you.   Where can you learn more?  Go to https://www.[employer].net/patiented  Enter G117 in the search box to learn more about \"Preventing Falls: Care Instructions.\"  Current as of: July 17, 2023               Content Version: 14.0    0821-2970 [employer].   Care instructions adapted under license by your healthcare professional. If you have questions about a medical condition or this instruction, always ask your healthcare professional. [employer] disclaims any warranty or liability for your use of this information.      Hearing Loss: Care Instructions  Overview     Hearing loss is a sudden or slow decrease in how well you hear. It can range from slight to profound. Permanent hearing loss can occur with aging. It also can happen when you are exposed long-term to loud noise. Examples include listening to loud music, riding motorcycles, or being around other loud machines.  Hearing loss can affect your work and home life. It can make you feel lonely or depressed. You may feel that you have lost your independence. But hearing aids and other devices can help you hear better and feel connected to others.  Follow-up care is a key part of your treatment and safety. Be sure to make and go to all appointments, and call your doctor if you are having problems. It's also a good idea to know your test results and keep a list of the medicines you take.  How can you care for yourself at home?  Avoid loud noises whenever possible. This helps keep your hearing from getting worse.  Always wear hearing protection around loud noises.  Wear a hearing aid as directed.  A professional can help you pick a hearing aid that will work best for you.  You can also get hearing aids over the counter for mild to moderate hearing loss.  Have hearing tests as your doctor suggests. They can show whether your hearing has changed. Your hearing aid may need to be adjusted.  Use " "other devices as needed. These may include:  Telephone amplifiers and hearing aids that can connect to a television, stereo, radio, or microphone.  Devices that use lights or vibrations. These alert you to the doorbell, a ringing telephone, or a baby monitor.  Television closed-captioning. This shows the words at the bottom of the screen. Most new TVs can do this.  TTY (text telephone). This lets you type messages back and forth on the telephone instead of talking or listening. These devices are also called TDD. When messages are typed on the keyboard, they are sent over the phone line to a receiving TTY. The message is shown on a monitor.  Use text messaging, social media, and email if it is hard for you to communicate by telephone.  Try to learn a listening technique called speechreading. It is not lipreading. You pay attention to people's gestures, expressions, posture, and tone of voice. These clues can help you understand what a person is saying. Face the person you are talking to, and have them face you. Make sure the lighting is good. You need to see the other person's face clearly.  Think about counseling if you need help to adjust to your hearing loss.  When should you call for help?  Watch closely for changes in your health, and be sure to contact your doctor if:    You think your hearing is getting worse.     You have new symptoms, such as dizziness or nausea.   Where can you learn more?  Go to https://www.[employer].net/patiented  Enter R798 in the search box to learn more about \"Hearing Loss: Care Instructions.\"  Current as of: September 27, 2023               Content Version: 14.0    7380-5515 [employer].   Care instructions adapted under license by your healthcare professional. If you have questions about a medical condition or this instruction, always ask your healthcare professional. [employer] disclaims any warranty or liability for your use of this " information.      Learning About Stress  What is stress?     Stress is your body's response to a hard situation. Your body can have a physical, emotional, or mental response. Stress is a fact of life for most people, and it affects everyone differently. What causes stress for you may not be stressful for someone else.  A lot of things can cause stress. You may feel stress when you go on a job interview, take a test, or run a race. This kind of short-term stress is normal and even useful. It can help you if you need to work hard or react quickly. For example, stress can help you finish an important job on time.  Long-term stress is caused by ongoing stressful situations or events. Examples of long-term stress include long-term health problems, ongoing problems at work, or conflicts in your family. Long-term stress can harm your health.  How does stress affect your health?  When you are stressed, your body responds as though you are in danger. It makes hormones that speed up your heart, make you breathe faster, and give you a burst of energy. This is called the fight-or-flight stress response. If the stress is over quickly, your body goes back to normal and no harm is done.  But if stress happens too often or lasts too long, it can have bad effects. Long-term stress can make you more likely to get sick, and it can make symptoms of some diseases worse. If you tense up when you are stressed, you may develop neck, shoulder, or low back pain. Stress is linked to high blood pressure and heart disease.  Stress also harms your emotional health. It can make you harper, tense, or depressed. Your relationships may suffer, and you may not do well at work or school.  What can you do to manage stress?  You can try these things to help manage stress:   Do something active. Exercise or activity can help reduce stress. Walking is a great way to get started. Even everyday activities such as housecleaning or yard work can help.  Try  yoga or kristen chi. These techniques combine exercise and meditation. You may need some training at first to learn them.  Do something you enjoy. For example, listen to music or go to a movie. Practice your hobby or do volunteer work.  Meditate. This can help you relax, because you are not worrying about what happened before or what may happen in the future.  Do guided imagery. Imagine yourself in any setting that helps you feel calm. You can use online videos, books, or a teacher to guide you.  Do breathing exercises. For example:  From a standing position, bend forward from the waist with your knees slightly bent. Let your arms dangle close to the floor.  Breathe in slowly and deeply as you return to a standing position. Roll up slowly and lift your head last.  Hold your breath for just a few seconds in the standing position.  Breathe out slowly and bend forward from the waist.  Let your feelings out. Talk, laugh, cry, and express anger when you need to. Talking with supportive friends or family, a counselor, or a alejo leader about your feelings is a healthy way to relieve stress. Avoid discussing your feelings with people who make you feel worse.  Write. It may help to write about things that are bothering you. This helps you find out how much stress you feel and what is causing it. When you know this, you can find better ways to cope.  What can you do to prevent stress?  You might try some of these things to help prevent stress:  Manage your time. This helps you find time to do the things you want and need to do.  Get enough sleep. Your body recovers from the stresses of the day while you are sleeping.  Get support. Your family, friends, and community can make a difference in how you experience stress.  Limit your news feed. Avoid or limit time on social media or news that may make you feel stressed.  Do something active. Exercise or activity can help reduce stress. Walking is a great way to get started.  Where  "can you learn more?  Go to https://www.[employer].net/patiented  Enter N032 in the search box to learn more about \"Learning About Stress.\"  Current as of: October 24, 2023               Content Version: 14.0    2957-5682 [employer].   Care instructions adapted under license by your healthcare professional. If you have questions about a medical condition or this instruction, always ask your healthcare professional. [employer] disclaims any warranty or liability for your use of this information.      Learning About Sleeping Well  What does sleeping well mean?     Sleeping well means getting enough sleep to feel good and stay healthy. How much sleep is enough varies among people.  The number of hours you sleep and how you feel when you wake up are both important. If you do not feel refreshed, you probably need more sleep. Another sign of not getting enough sleep is feeling tired during the day.  Experts recommend that adults get at least 7 or more hours of sleep per day. Children and older adults need more sleep.  Why is getting enough sleep important?  Getting enough quality sleep is a basic part of good health. When your sleep suffers, your physical health, mood, and your thoughts can suffer too. You may find yourself feeling more grumpy or stressed. Not getting enough sleep also can lead to serious problems, including injury, accidents, anxiety, and depression.  What might cause poor sleeping?  Many things can cause sleep problems, including:  Changes to your sleep schedule.  Stress. Stress can be caused by fear about a single event, such as giving a speech. Or you may have ongoing stress, such as worry about work or school.  Depression, anxiety, and other mental or emotional conditions.  Changes in your sleep habits or surroundings. This includes changes that happen where you sleep, such as noise, light, or sleeping in a different bed. It also includes changes in your sleep pattern, such " "as having jet lag or working a late shift.  Health problems, such as pain, breathing problems, and restless legs syndrome.  Lack of regular exercise.  Using alcohol, nicotine, or caffeine before bed.  How can you help yourself?  Here are some tips that may help you sleep more soundly and wake up feeling more refreshed.  Your sleeping area   Use your bedroom only for sleeping and sex. A bit of light reading may help you fall asleep. But if it doesn't, do your reading elsewhere in the house. Try not to use your TV, computer, smartphone, or tablet while you are in bed.  Be sure your bed is big enough to stretch out comfortably, especially if you have a sleep partner.  Keep your bedroom quiet, dark, and cool. Use curtains, blinds, or a sleep mask to block out light. To block out noise, use earplugs, soothing music, or a \"white noise\" machine.  Your evening and bedtime routine   Create a relaxing bedtime routine. You might want to take a warm shower or bath, or listen to soothing music.  Go to bed at the same time every night. And get up at the same time every morning, even if you feel tired.  What to avoid   Limit caffeine (coffee, tea, caffeinated sodas) during the day, and don't have any for at least 6 hours before bedtime.  Avoid drinking alcohol before bedtime. Alcohol can cause you to wake up more often during the night.  Try not to smoke or use tobacco, especially in the evening. Nicotine can keep you awake.  Limit naps during the day, especially close to bedtime.  Avoid lying in bed awake for too long. If you can't fall asleep or if you wake up in the middle of the night and can't get back to sleep within about 20 minutes, get out of bed and go to another room until you feel sleepy.  Avoid taking medicine right before bed that may keep you awake or make you feel hyper or energized. Your doctor can tell you if your medicine may do this and if you can take it earlier in the day.  If you can't sleep   Imagine " "yourself in a peaceful, pleasant scene. Focus on the details and feelings of being in a place that is relaxing.  Get up and do a quiet or boring activity until you feel sleepy.  Avoid drinking any liquids before going to bed to help prevent waking up often to use the bathroom.  Where can you learn more?  Go to https://www.[employer].net/patiented  Enter J942 in the search box to learn more about \"Learning About Sleeping Well.\"  Current as of: July 10, 2023               Content Version: 14.0    7277-4678 [employer].   Care instructions adapted under license by your healthcare professional. If you have questions about a medical condition or this instruction, always ask your healthcare professional. [employer] disclaims any warranty or liability for your use of this information.      Substance Use Disorder: Care Instructions  Overview     You can improve your life and health by stopping your use of alcohol or drugs. When you don't drink or use drugs, you may feel and sleep better. You may get along better with your family, friends, and coworkers. There are medicines and programs that can help with substance use disorder.  How can you care for yourself at home?  Here are some ways to help you stay sober and prevent relapse.  If you have been given medicine to help keep you sober or reduce your cravings, be sure to take it exactly as prescribed.  Talk to your doctor about programs that can help you stop using drugs or drinking alcohol.  Do not keep alcohol or drugs in your home.  Plan ahead. Think about what you'll say if other people ask you to drink or use drugs. Try not to spend time with people who drink or use drugs.  Use the time and money spent on drinking or drugs to do something that's important to you.  Preventing a relapse  Have a plan to deal with relapse. Learn to recognize changes in your thinking that lead you to drink or use drugs. Get help before you start to drink or use " drugs again.  Try to stay away from situations, friends, or places that may lead you to drink or use drugs.  If you feel the need to drink alcohol or use drugs again, seek help right away. Call a trusted friend or family member. Some people get support from organizations such as Narcotics Anonymous or [employer] or from treatment facilities.  If you relapse, get help as soon as you can. Some people make a plan with another person that outlines what they want that person to do for them if they relapse. The plan usually includes how to handle the relapse and who to notify in case of relapse.  Don't give up. Remember that a relapse doesn't mean that you have failed. Use the experience to learn the triggers that lead you to drink or use drugs. Then quit again. Recovery is a lifelong process. Many people have several relapses before they are able to quit for good.  Follow-up care is a key part of your treatment and safety. Be sure to make and go to all appointments, and call your doctor if you are having problems. It's also a good idea to know your test results and keep a list of the medicines you take.  When should you call for help?   Call [phone]  anytime you think you may need emergency care. For example, call if you or someone else:    Has overdosed or has withdrawal signs. Be sure to tell the emergency workers that you are or someone else is using or trying to quit using drugs. Overdose or withdrawal signs may include:  Losing consciousness.  Seizure.  Seeing or hearing things that aren't there (hallucinations).     Is thinking or talking about suicide or harming others.   Where to get help 24 hours a day, 7 days a week   If you or someone you know talks about suicide, self-harm, a mental health crisis, a substance use crisis, or any other kind of emotional distress, get help right away. You can:    Call the Suicide and Crisis Lifeline at 029.     Call 4-432-004-TALK (1-409.497.7401).     Text HOME to 159967 to  "access the Crisis Text Line.   Consider saving these numbers in your phone.  Go to [employer] for more information or to chat online.  Call your doctor now or seek immediate medical care if:    You are having withdrawal symptoms. These may include nausea or vomiting, sweating, shakiness, and anxiety.   Watch closely for changes in your health, and be sure to contact your doctor if:    You have a relapse.     You need more help or support to stop.   Where can you learn more?  Go to https://www.[employer].net/patiented  Enter H573 in the search box to learn more about \"Substance Use Disorder: Care Instructions.\"  Current as of: November 15, 2023               Content Version: 14.0    1870-8947 [employer].   Care instructions adapted under license by your healthcare professional. If you have questions about a medical condition or this instruction, always ask your healthcare professional. [employer] disclaims any warranty or liability for your use of this information.      Lung Cancer Screening   Frequently Asked Questions  If you are at high-risk for lung cancer, getting screened with low-dose computed tomography (LDCT) every year can help save your life. This handout offers answers to some of the most common questions about lung cancer screening. If you have other questions, please call [phone] ([phone]).     What is it?  Lung cancer screening uses special X-ray technology to create an image of your lung tissue. The exam is quick and easy and takes less than 10 seconds. We don t give you any medicine or use any needles. You can eat before and after the exam. You don t need to change your clothes as long as the clothing on your chest doesn t contain metal. But, you do need to be able to hold your breath for at least 6 seconds during the exam.    What is the goal of lung cancer screening?  The goal of lung cancer screening is to save lives. Many times, lung cancer is not " found until a person starts having physical symptoms. Lung cancer screening can help detect lung cancer in the earliest stages when it may be easier to treat.    Who should be screened for lung cancer?  We suggest lung cancer screening for anyone who is at high-risk for lung cancer. You are in the high-risk group if you:     are between the ages of 55 and 79, and   have smoked at least 1 pack of cigarettes a day for 20 or more years, and   still smoke or have quit within the past 15 years.    However, if you have a new cough or shortness of breath, you should talk to your doctor before being screened.    Why does it matter if I have symptoms?  Certain symptoms can be a sign that you have a condition in your lungs that should be checked and treated by your doctor. These symptoms include fever, chest pain, a new or changing cough, shortness of breath that you have never felt before, coughing up blood or unexplained weight loss. Having any of these symptoms can greatly affect the results of lung cancer screening.       Should all smokers get an LDCT lung cancer screening exam?  It depends. Lung cancer screening is for a very specific group of men and women who have a history of heavy smoking over a long period of time (see  Who should be screened for lung cancer  above).  I am in the high-risk group, but have been diagnosed with cancer in the past. Is LDCT lung cancer screening right for me?  In some cases, you should not have LDCT lung screening, such as when your doctor is already following your cancer with CT scan studies. Your doctor will help you decide if LDCT lung screening is right for you.  Do I need to have a screening exam every year?  Yes. If you are in the high-risk group described earlier, you should get an LDCT lung cancer screening exam every year until you are 79, or are no longer willing or able to undergo screening and possible procedures to diagnose and treat lung cancer.  How effective is LDCT at  preventing death from lung cancer?  Studies have shown that LDCT lung cancer screening can lower the risk of death from lung cancer by 20 percent in people who are at high-risk.  What are the risks?  There are some risks and limitations of LDCT lung cancer screening. We want to make sure you understand the risks and benefits, so please let us know if you have any questions. Your doctor may want to talk with you more about these risks.   Radiation exposure: As with any exam that uses radiation, there is a very small increased risk of cancer. The amount of radiation in LDCT is small--about the same amount a person would get from a mammogram. Your doctor orders the exam when he or she feels the potential benefits outweigh the risks.   False negatives: No test is perfect, including LDCT. It is possible that you may have a medical condition, including lung cancer, that is not found during your exam. This is called a false negative result.   False positives and more testing: LDCT very often finds something in the lung that could be cancer, but in fact is not. This is called a false positive result. False positive tests often cause anxiety. To make sure these findings are not cancer, you may need to have more tests. These tests will be done only if you give us permission. Sometimes patients need a treatment that can have side effects, such as a biopsy. For more information on false positives, see  What can I expect from the results?    Findings not related to lung cancer: Your LDCT exam also takes pictures of areas of your body next to your lungs. In a very small number of cases, the CT scan will show an abnormal finding in one of these areas, such as your kidneys, adrenal glands, liver or thyroid. This finding may not be serious, but you may need more tests. Your doctor can help you decide what other tests you may need, if any.  What can I expect from the results?  About 1 out of 4 LDCT exams will find something that may  need more tests. Most of the time, these findings are lung nodules. Lung nodules are very small collections of tissue in the lung. These nodules are very common, and the vast majority--more than 97 percent--are not cancer (benign). Most are normal lymph nodes or small areas of scarring from past infections.  But, if a small lung nodule is found to be cancer, the cancer can be cured more than 90 percent of the time. To know if the nodule is cancer, we may need to get more images before your next yearly screening exam. If the nodule has suspicious features (for example, it is large, has an odd shape or grows over time), we will refer you to a specialist for further testing.  Will my doctor also get the results?  Yes. Your doctor will get a copy of your results.  Is it okay to keep smoking now that there s a cancer screening exam?  No. Tobacco is one of the strongest cancer-causing agents. It causes not only lung cancer, but other cancers and cardiovascular (heart) diseases as well. The damage caused by smoking builds over time. This means that the longer you smoke, the higher your risk of disease. While it is never too late to quit, the sooner you quit, the better.  Where can I find help to quit smoking?  The best way to prevent lung cancer is to stop smoking. If you have already quit smoking, congratulations and keep it up! For help on quitting smoking, please call [facility] at 2-808-QUITNOW (1-252.736.9529) or the American [employer] at 1-894.769.3149 to find local resources near you.  One-on-one health coaching:  If you d prefer to work individually with a health care provider on tobacco cessation, we offer:     Medication Therapy Management:  Our specially trained pharmacists work closely with you and your doctor to help you quit smoking.  Call [phone] or [phone] (toll free).

## 2024-04-17 NOTE — PROGRESS NOTES
Preventive Care Visit  [facility]  Jade Quinn, APRN CNP, Nurse Practitioner - Family  Apr 17, 2024      Assessment & Plan   Problem List Items Addressed This Visit          Digestive    Class 2 severe obesity due to excess calories with serious comorbidity in adult (H)       Endocrine    Hyperlipidemia    Relevant Medications    rosuvastatin (CRESTOR) 5 MG tablet    Other Relevant Orders    Comprehensive Metabolic Panel (Completed)    Lipid Panel (Completed)       Behavioral    Other specified anxiety disorders    Relevant Medications    ALPRAZolam (XANAX) 0.5 MG tablet     Other Visit Diagnoses       Impacted cerumen of left ear    -  Primary    Relevant Orders    HC REMOVAL IMPACTED CERUMEN IRRIGATION/LVG UNILAT (Completed)    Encounter for Medicare annual wellness exam        Personal history of tobacco use        Relevant Orders    Prof fee: Shared Decision Making for Lung Cancer Screening (Completed)    CT Chest Lung Cancer Scrn Low Dose wo    Apnea        Relevant Orders    Adult Sleep Eval & Management  Referral    DDD (degenerative disc disease), thoracic        Relevant Medications    HYDROcodone-acetaminophen (NORCO) 5-325 MG tablet    Screening for prostate cancer        Relevant Orders    PSA Screen GH (Completed)    Eye redness            He has gained weight, unable to be physically active since the back pain started.  Increasing risk for hypertension and sleep apnea.  Hyperlipidemia, refilled rosuvastatin.  CMP and lipid panel updated.  Will follow-up with results when available.  Continues on Xanax twice daily.  [facility] reviewed and as expected.  Order for 6 months provided.  Impacted wax left ear, flushed by nursing and tolerated well.  Recommend hydrocortisone cream to ear canal  Lung cancer screening ordered for routine screening  Reported apneic episodes, elevated blood pressure and weight gain, sleep study referral placed.  Chronic thoracic back pain,  "working with specialist in a few weeks.  Short course of hydrocodone with acetaminophen provided.  He is aware that this will not be a long-term prescription and if he did need refills he would need to be seen in office.  We did discuss avoidance of Xanax and hydrocodone at the same time, risks of respiratory concerns.  PSA ordered for screening  Right eye redness, concerns for allergy.  Recommend Pataday eyedrops, follow-up with eye doctor if needed.  Recommend shingles, RSV, pneumonia and Tdap, he will follow-up with local pharmacy if desired.  Up-to-date on other preventative healthcare needs      Patient has been advised of split billing requirements and indicates understanding: Yes  Ordering of each unique test  Prescription drug management       BMI  Estimated body mass index is 37.23 kg/m  as calculated from the following:    Height as of this encounter: 1.88 m (6' 2\").    Weight as of this encounter: 131.5 kg (290 lb).   Weight management plan: Discussed healthy diet and exercise guidelines    Counseling  Appropriate preventive services were discussed with this patient, including applicable screening as appropriate for fall prevention, nutrition, physical activity, Tobacco-use cessation, weight loss and cognition.  Checklist reviewing preventive services available has been given to the patient.  Reviewed patient's diet, addressing concerns and/or questions.   He is at risk for psychosocial distress and has been provided with information to reduce risk.   Discussed possible causes of fatigue. The patient was provided with written information regarding signs of hearing loss.         No follow-ups on file.      Nichole Coats is a 69 year old, presenting for the following:  Medicare Visit          Health Care Directive  Patient does not have a Health Care Directive or Living Will:     HPI    He presents to clinic today to discuss multiple concerns as well as complete his annual wellness exam.    He has been " having some crusting that builds up in his left ear for the past month and a half.  It is not painful or itchy but does have to peel off the scab frequently.    He has had chronic issues with red puffy eyes, itchy and uncomfortable at times.  He uses refresh and Lumify which does provide some relief.  Saw an eye doctor a year ago.  Right seems worse than left.  Wife has been noting this to be worsening.    He is having sleep concerns.  Wife reports that she wakes up and notices him not breathing in his sleep.  He does not feel rested in the morning, sleeps from about 9 PM to 2 AM will wake up and try to go back to sleep but often gets up between 3 and 4 in the morning.  He will also nap in the morning.  He does snore.  Has had increased weight gain and increased blood pressure recently.  He has been dealing with a lot of chronic back pain.  He does report a sleep study 20 years ago but does not know the results of this.    He continues to have chronic thoracic back pain.  This has been significantly affecting his life.  At rest he has pain 1 out of 10, with any movement after 5 minutes it is 6 out of 10.  He is unable to do yard work, canoeing, and doing his normal work.  He has done naproxen and Flexeril which have not been helpful.  Has had therapy and injections.  He did see spine specialist and has an appointment in early May for procedure to see about an ablation.  Nothing seems to help for the pain other than rest.  He is wondering about pain medication today.        4/17/2024   General Health   How would you rate your overall physical health? Good   Feel stress (tense, anxious, or unable to sleep) Only a little   (!) STRESS CONCERN      4/17/2024   Nutrition   Diet: Regular (no restrictions)         4/17/2024   Exercise   Days per week of moderate/strenous exercise 4 days         4/17/2024   Social Factors   Frequency of gathering with friends or relatives Three times a week   Worry food won't last until get  money to buy more No   Food not last or not have enough money for food? No   Do you have housing?  Yes   Are you worried about losing your housing? No   Lack of transportation? No   Unable to get utilities (heat,electricity)? No         4/17/2024   Fall Risk   Fallen 2 or more times in the past year? No   Trouble with walking or balance? Yes           4/17/2024   Activities of Daily Living- Home Safety   Needs help with the following daily activites None of the above   Safety concerns in the home None of the above         4/17/2024   Dental   Dentist two times every year? Yes         4/17/2024   Hearing Screening   Hearing concerns? (!) IT'S HARDER TO UNDERSTAND WOMEN'S VOICES THAN MEN'S VOICES.    (!) TROUBLE UNDERSTANDING SOFT OR WHISPERED SPEECH.         4/17/2024   Driving Risk Screening   Patient/family members have concerns about driving No         4/17/2024   General Alertness/Fatigue Screening   Have you been more tired than usual lately? (!) YES         4/17/2024   Urinary Incontinence Screening   Bothered by leaking urine in past 6 months No         4/17/2024   TB Screening   Were you born outside of the [place]? No         Today's PHQ-2 Score:       4/17/2024     7:21 AM   PHQ-2 ( 1999 [employer])   Q1: Little interest or pleasure in doing things 0   Q2: Feeling down, depressed or hopeless 0   PHQ-2 Score 0   Q1: Little interest or pleasure in doing things Not at all   Q2: Feeling down, depressed or hopeless Not at all   PHQ-2 Score 0           4/17/2024   Substance Use   Alcohol more than 3/day or more than 7/wk No   Do you have a current opioid prescription? No   How severe/bad is pain from 1 to 10? 4/10   Do you use any other substances recreationally? (!) ALCOHOL     Social History     Tobacco Use    Smoking status: Former     Current packs/day: 0.00     Average packs/day: 0.5 packs/day for 40.0 years (20.0 ttl pk-yrs)     Types: Cigarettes     Start date: 1/3/1980     Quit date: 1/3/2020     Years since  quittin.2    Smokeless tobacco: Never   Vaping Use    Vaping status: Never Used   Substance Use Topics    Alcohol use: Yes     Comment: 2-3 times per week    Drug use: Never       Last PSA:   PSA Tumor Marker   Date Value Ref Range Status   2023 0.43 0.00 - 4.50 ng/mL Final   2022 0.40 0.00 - 4.00 ug/L Final     ASCVD Risk   The 10-year ASCVD risk score (Brandon NICHOLSON, et al., 2019) is: 17.6%    Values used to calculate the score:      Age: 69 years      Sex: Male      Is Non- : No      Diabetic: No      Tobacco smoker: No      Systolic Blood Pressure: 156 mmHg      Is BP treated: No      HDL Cholesterol: 43 mg/dL      Total Cholesterol: 139 mg/dL            Reviewed and updated as needed this visit by Provider                    Past Medical History:   Diagnosis Date    Benign neoplasm     2010,w/ severe atypia noted on colonoscopy    Colonic polyp     Erectile dysfunction     History of vasculitis of skin     Hyperlipidemia     Kidney stones     Medullary cystic kidney     No Comments Provided    Personal history of nicotine dependence     No Comments Provided     Past Surgical History:   Procedure Laterality Date    APPENDECTOMY OPEN      No Comments Provided    AS REMOVAL OF KIDNEY STONE  2021    COLONOSCOPY      ,follow-up recommended in 6 months    COLONOSCOPY  2010,F/U     COLONOSCOPY  2016    3/7/16,F/U  tubular adenomas    COLONOSCOPY N/A 03/15/2021    F/U  h/o adenomatous polyps    URETHROPLASTY       Patient Active Problem List   Diagnosis    Other specified anxiety disorders    Diverticulosis of sigmoid colon    H/O adenomatous polyp of colon    Hyperlipidemia    Irritable bowel syndrome    History of smoking    Pulmonary nodules    History of vasculitis of skin    Peripheral polyneuropathy    Hyperglycemia    Restless leg syndrome    Chronic bilateral low back pain without sciatica    Rib pain on right side     DDD (degenerative disc disease), lumbar    Class 2 severe obesity due to excess calories with serious comorbidity in adult (H)     Past Surgical History:   Procedure Laterality Date    APPENDECTOMY OPEN      No Comments Provided    AS REMOVAL OF KIDNEY STONE  2021    COLONOSCOPY      ,follow-up recommended in 6 months    COLONOSCOPY  2010,F/U     COLONOSCOPY  2016    3/7/16,F/U  tubular adenomas    COLONOSCOPY N/A 03/15/2021    F/U  h/o adenomatous polyps    URETHROPLASTY         Social History     Tobacco Use    Smoking status: Former     Current packs/day: 0.00     Average packs/day: 0.5 packs/day for 40.0 years (20.0 ttl pk-yrs)     Types: Cigarettes     Start date: 1/3/1980     Quit date: 1/3/2020     Years since quittin.2    Smokeless tobacco: Never   Substance Use Topics    Alcohol use: Yes     Comment: 2-3 times per week     Family History   Problem Relation Age of Onset    Emphysema Mother     Arthritis Father     Other - See Comments Sister         Unknown    Other - See Comments Brother         Recovering addict    Coronary Artery Disease Brother 61    Colon Cancer Brother     Coronary Artery Disease Maternal Grandfather         Coronary artery disease,60- lived into 80s- smoked early    Other - See Comments Other         No FHx DM or CAD, no cancers in first degree relatives.         Current Outpatient Medications   Medication Sig Dispense Refill    ALPRAZolam (XANAX) 0.5 MG tablet Take 1 tablet (0.5 mg) by mouth 2 times daily as needed for anxiety 60 tablet 0    HYDROcodone-acetaminophen (NORCO) 5-325 MG tablet Take 1 tablet by mouth every 6 hours as needed for severe pain 40 tablet 0    Multiple Vitamin (MULTI-VITAMINS) TABS Take 1 tablet by mouth daily      rosuvastatin (CRESTOR) 5 MG tablet Take 1 tablet (5 mg) by mouth daily 90 tablet 0    triamcinolone (KENALOG) 0.1 % external ointment Apply topically 2 times daily 30 g 3    vitamin C (ASCORBIC  "ACID) 500 MG tablet Take 1 tablet (500 mg) by mouth daily       No Known Allergies  Current providers sharing in care for this patient include:  Patient Care Team:  Jade Quinn APRN CNP as PCP - General (Nurse Practitioner - Family)  Jade Quinn APRN CNP as Assigned PCP    The following health maintenance items are reviewed in Epic and correct as of today:  Health Maintenance   Topic Date Due    ZOSTER IMMUNIZATION (1 of 2) Never done    RSV VACCINE (Pregnancy & 60+) (1 - 1-dose 60+ series) Never done    Pneumococcal Vaccine: 65+ Years (2 of 2 - PCV) 02/25/2022    DTAP/TDAP/TD IMMUNIZATION (4 - Td or Tdap) 09/16/2023    MEDICARE ANNUAL WELLNESS VISIT  01/19/2024    LIPID  01/19/2024    LUNG CANCER SCREENING  03/14/2024    FALL RISK ASSESSMENT  04/17/2025    GLUCOSE  01/19/2026    COLORECTAL CANCER SCREENING  03/15/2026    ADVANCE CARE PLANNING  01/19/2028    HEPATITIS C SCREENING  Completed    PHQ-2 (once per calendar year)  Completed    INFLUENZA VACCINE  Completed    AORTIC ANEURYSM SCREENING (SYSTEM ASSIGNED)  Completed    COVID-19 Vaccine  Completed    IPV IMMUNIZATION  Aged Out    HPV IMMUNIZATION  Aged Out    MENINGITIS IMMUNIZATION  Aged Out    RSV MONOCLONAL ANTIBODY  Aged Out         Review of Systems  Negative other than noted above     Objective    Exam  BP (!) 156/88   Pulse 80   Resp 20   Ht 1.88 m (6' 2\")   Wt 131.5 kg (290 lb)   SpO2 95%   BMI 37.23 kg/m     Estimated body mass index is 37.23 kg/m  as calculated from the following:    Height as of this encounter: 1.88 m (6' 2\").    Weight as of this encounter: 131.5 kg (290 lb).    Physical Exam  GENERAL: alert and no distress  EYES: Eyes grossly normal to inspection, PERRL and conjunctivae and sclerae mildly redenned  HENT: left canal with small dry flaking skin. Impacted was noted in left canal. Small firm lesion to outer ear.  NECK: no adenopathy, no asymmetry, masses, or scars  RESP: lungs clear to auscultation - no rales, " rhonchi or wheezes  CV: regular rate and rhythm, normal S1 S2, no S3 or S4, no murmur, click or rub, no peripheral edema  ABDOMEN: soft, nontender, no hepatosplenomegaly, no masses and bowel sounds normal  MS: no gross musculoskeletal defects noted, no edema  SKIN: no suspicious lesions or rashes  NEURO: Normal strength and tone, mentation intact and speech normal  PSYCH: mentation appears normal, affect normal/bright        4/17/2024   Mini Cog   Clock Draw Score 2 Normal   3 Item Recall 3 objects recalled   Mini Cog Total Score 5              Signed Electronically by: ERROL Mcknight [facility]  Lung Cancer Screening Shared Decision Making Visit     Garland Rice, a 69 year old male, is eligible for lung cancer screening    History   Smoking Status    Former    Packs/day: 0.50    Years: 40.00    Types: Cigarettes    Quit date: 1/3/2020   Smokeless Tobacco    Never       I have discussed with patient the risks and benefits of screening for lung cancer with low-dose CT.     The risks include:    radiation exposure: one low dose chest CT has as much ionizing radiation as about 15 chest x-rays, or 6 months of background radiation living in [place]      false positives: most findings/nodules are NOT cancer, but some might still require additional diagnostic evaluation, including biopsy    over-diagnosis: some slow growing cancers that might never have been clinically significant will be detected and treated unnecessarily     The benefit of early detection of lung cancer is contingent upon adherence to annual screening or more frequent follow up if indicated.     Furthermore, to benefit from screening, Garland must be willing and able to undergo diagnostic procedures, if indicated. Although no specific guide is available for determining severity of comorbidities, it is reasonable to withhold screening in patients who have greater mortality risk from other diseases.     We did discuss that the best way to prevent  lung cancer is to not smoke.    Some patients may value a numeric estimation of lung cancer risk when evaluating if lung cancer screening is right for them, here is one calculator:    ShouldIScreen

## 2024-04-22 ENCOUNTER — MYC MEDICAL ADVICE (OUTPATIENT)
Dept: PULMONOLOGY | Facility: OTHER | Age: 69
End: 2024-04-22

## 2024-04-23 ASSESSMENT — SLEEP AND FATIGUE QUESTIONNAIRES
HOW LIKELY ARE YOU TO NOD OFF OR FALL ASLEEP WHILE WATCHING TV: HIGH CHANCE OF DOZING
HOW LIKELY ARE YOU TO NOD OFF OR FALL ASLEEP WHEN YOU ARE A PASSENGER IN A CAR FOR AN HOUR WITHOUT A BREAK: SLIGHT CHANCE OF DOZING
HOW LIKELY ARE YOU TO NOD OFF OR FALL ASLEEP WHILE SITTING INACTIVE IN A PUBLIC PLACE: MODERATE CHANCE OF DOZING
HOW LIKELY ARE YOU TO NOD OFF OR FALL ASLEEP WHILE SITTING AND TALKING TO SOMEONE: WOULD NEVER DOZE
HOW LIKELY ARE YOU TO NOD OFF OR FALL ASLEEP WHILE SITTING AND READING: HIGH CHANCE OF DOZING
HOW LIKELY ARE YOU TO NOD OFF OR FALL ASLEEP WHILE SITTING QUIETLY AFTER LUNCH WITHOUT ALCOHOL: SLIGHT CHANCE OF DOZING
HOW LIKELY ARE YOU TO NOD OFF OR FALL ASLEEP WHILE LYING DOWN TO REST IN THE AFTERNOON WHEN CIRCUMSTANCES PERMIT: HIGH CHANCE OF DOZING
HOW LIKELY ARE YOU TO NOD OFF OR FALL ASLEEP IN A CAR, WHILE STOPPED FOR A FEW MINUTES IN TRAFFIC: WOULD NEVER DOZE

## 2024-04-26 ENCOUNTER — HOSPITAL ENCOUNTER (OUTPATIENT)
Dept: CT IMAGING | Facility: OTHER | Age: 69
Discharge: HOME | End: 2024-04-26
Attending: NURSE PRACTITIONER | Admitting: NURSE PRACTITIONER
Payer: COMMERCIAL

## 2024-04-26 DIAGNOSIS — Z87.891 PERSONAL HISTORY OF TOBACCO USE: ICD-10-CM

## 2024-04-26 PROCEDURE — 71271 CT THORAX LUNG CANCER SCR C-: CPT

## 2024-04-30 ENCOUNTER — MYC MEDICAL ADVICE (OUTPATIENT)
Dept: FAMILY MEDICINE | Facility: OTHER | Age: 69
End: 2024-04-30
Payer: COMMERCIAL

## 2024-04-30 DIAGNOSIS — M51.34 DDD (DEGENERATIVE DISC DISEASE), THORACIC: ICD-10-CM

## 2024-04-30 RX ORDER — HYDROCODONE BITARTRATE AND ACETAMINOPHEN 5; 325 MG/1; MG/1
1 TABLET ORAL EVERY 6 HOURS PRN
Qty: 40 TABLET | Refills: 0 | Status: SHIPPED | OUTPATIENT
Start: 2024-04-30 | End: 2024-05-24

## 2024-04-30 NOTE — PROGRESS NOTES
04/23/24 1423   Reason For Your Visit   Please briefly describe the main reason(s) for your sleep visit Broken up sleep pattern. Sleeping 4-5 hours at night and then a nap mid morning. Not feeling well rested. My wife says I stop breathing at times archie snore   Approximately when did this problem start Sept. 2024   What are your goals for this visit To get back to restful sleep.   Time in Bed - Work Or School Days   Do you work or go to school No   What time do you usually get into bed 9pm   About how long does it take you to fall asleep A few minutes.   How often do you have trouble falling asleep Rarely   How often do you wake up during the night 5-10   About how long does it take to fall back to sleep At first only a few minutes. The duration gets longer until eventually I have to get up.   What do you usually do if you have trouble getting back to sleep Get up.   What time do you usually get out of bed to start your day 2-3 AM   Do you use an alarm No   Time in Bed - Weekends/Non-work Days/All Other Days   What time do you usually get into bed 9PM   About how long does it take you to fall asleep A few minutes   What time do you usually get out of bed to start your day 2-3AM   Do you use an alarm No   Sleep Need   About how much sleep do you think you need 8-9 hours   Sleep Position   How often do you take a nap on purpose 7   Do you feel better after naps Yes   How often do you doze off unintentionally 7   Have you ever had a driving accident or near-miss due to sleepiness/drowsiness No   Sleep Disruptions - Breathing/Snoring   Do you snore Yes   Do other people complain about your snoring Yes   Have you been told you stop breathing in your sleep Yes   Do you have issues with any of the following Morning mouth dryness   Sleep Disruptions - Movement   Do you get pain, discomfort, with an urge to move Yes   Does it happen when you are resting Yes   Does it get better if you move around Yes   Does it happen more  at night Yes   Have you been told you kick your legs at night No   Sleep Disruptions - Behaviours in Sleep   Have you ever experienced any of the following during your sleep Sleep-talking   Do you ever experience sudden muscle weakness during the day No   4) Is there anything else you would like your sleep provider to know I haven't been able to work the last 5 months due to back issues. Imbeing treated for it.   Caffeine, Alcohol and Other Substances   What time of day is your last caffeine use 8AM   Do you drink alcohol to help you sleep No   Do you drink alcohol near bedtime No   Family History   Has any family member been diagnosed with a sleep disorder No   In the last TWO WEEKS have you experienced any of the following symptoms?   Night Sweats Yes   Weight Gain Yes   Pain at Night Yes   Sore Throat in Morning Yes   Dry Mouth in the Morning Yes   Joint Pains at Night Yes   Headaches in Morning Yes   Weakness in Arms or Legs Yes   Anxiety Yes           4/23/2024     2:30 PM    Elida Sleepiness Scale ( MANUEL Stanford  [id number]<br>ESS - [place]/English - Final version - 21 Nov 07 - [facility] [employer].)   Sitting and reading High chance of dozing   Watching TV High chance of dozing   Sitting, inactive in a public place (e.g. a theatre or a meeting) Moderate chance of dozing   As a passenger in a car for an hour without a break Slight chance of dozing   Lying down to rest in the afternoon when circumstances permit High chance of dozing   Sitting and talking to someone Would never doze   Sitting quietly after a lunch without alcohol Slight chance of dozing   In a car, while stopped for a few minutes in traffic Would never doze   Elida Score (MC) 13   Elida Score (Sleep) 13         4/23/2024     2:26 PM   Insomnia Severity Index (CHAVO)   Difficulty falling asleep 0   Difficulty staying asleep 2   Problems waking up too early 3   How SATISFIED/DISSATISFIED are you with your CURRENT sleep pattern? 3   How NOTICEABLE  to others do you think your sleep problem is in terms of impairing the quality of your life? 2   How WORRIED/DISTRESSED are you about your current sleep problem? 2   To what extent do you consider your sleep problem to INTERFERE with your daily functioning (e.g. daytime fatigue, mood, ability to function at work/daily chores, concentration, memory, mood, etc.) CURRENTLY? 2   CHAVO Total Score 14         4/23/2024     2:28 PM   STOP BANG Questionnaire (  2008, the American Society [employer]est[employer]   1. Snoring - Do you snore loudly (louder than talking or loud enough to be heard through closed doors)? Yes   2. Tired - Do you often feel tired, fatigued, or sleepy during daytime? Yes   3. Observed - Has anyone observed you stop breathing during your sleep? Yes   4. Blood pressure - Do you have or are you being treated for high blood pressure? No   5. BMI - BMI more than 35 kg/m2? Yes   6. Age - Age over 50 yr old? Yes   7. Neck circumference - Neck circumference greater than 40 cm? Yes   8. Gender - Gender male? Yes   STOP BANG Score (MC): 6 (High risk of LEON)

## 2024-04-30 NOTE — TELEPHONE ENCOUNTER
Chart review prior to sleep testing.    Patient Summary:  69 year old male who is referred for insomnia.    Patient Active Problem List    Diagnosis Date Noted    Class 2 severe obesity due to excess calories with serious comorbidity in adult (H) 04/17/2024     Priority: Medium    Chronic bilateral low back pain without sciatica 12/05/2023     Priority: Medium    Rib pain on right side 12/05/2023     Priority: Medium    DDD (degenerative disc disease), lumbar 12/05/2023     Priority: Medium    Restless leg syndrome 06/06/2023     Priority: Medium    Hyperglycemia 01/19/2023     Priority: Medium    History of vasculitis of skin 01/18/2022     Priority: Medium    Peripheral polyneuropathy 01/18/2022     Priority: Medium    History of smoking 12/03/2020     Priority: Medium    Pulmonary nodules 12/03/2020     Priority: Medium    Other specified anxiety disorders 01/17/2018     Priority: Medium    Hyperlipidemia 01/17/2018     Priority: Medium    Irritable bowel syndrome 01/17/2018     Priority: Medium    H/O adenomatous polyp of colon 03/02/2016     Priority: Medium    Diverticulosis of sigmoid colon 01/11/2010     Priority: Medium       Current Outpatient Medications   Medication Sig Dispense Refill    ALPRAZolam (XANAX) 0.5 MG tablet Take 1 tablet (0.5 mg) by mouth 2 times daily as needed for anxiety 60 tablet 0    HYDROcodone-acetaminophen (NORCO) 5-325 MG tablet Take 1 tablet by mouth every 6 hours as needed for severe pain 40 tablet 0    Multiple Vitamin (MULTI-VITAMINS) TABS Take 1 tablet by mouth daily      rosuvastatin (CRESTOR) 5 MG tablet Take 1 tablet (5 mg) by mouth daily 90 tablet 0    triamcinolone (KENALOG) 0.1 % external ointment Apply topically 2 times daily 30 g 3    vitamin C (ASCORBIC ACID) 500 MG tablet Take 1 tablet (500 mg) by mouth daily       No current facility-administered medications for this visit.       Pertinent PMHx of obesity, anxiety.    STOP-BANG score of 6, with unknown neck  "circumference.  [place]ouse score of 13.  CHAVO: 14    BMI of Estimated body mass index is 37.23 kg/m  as calculated from the following:    Height as of 4/17/24: 1.88 m (6' 2\").    Weight as of 4/17/24: 131.5 kg (290 lb).     Chief concern per questionnaire: \"Broken up sleep pattern. Sleeping 4-5 hours at night and then a nap mid morning. Not feeling well rested. My wife says I stop breathing at times archie snore \"    Duration of symptoms:  \"Sept. 2024 \"    Goals for visit per questionnaire: \"To get back to restful sleep. \"    Sleep pattern:  Workdays.  9pm to 2-3am.  Weekends.  9pm to 2-3am.  Time to fall asleep: ~few minutes.  Awakenings: 5-10 times per night, ? minutes to return to sleep.  Average total sleep time:  ? hours  Napping.  7 days per week, ? hours per nap.    Yes for RLS screen.  No for sleep walking.  No for dream enactment behavior.  No for bruxism.    No for morning headaches.  Yes for snoring.  Yes for observed apnea.  No for FHx of LEON.    Caffeine use:  ? for 3+ per day.  No for within 6 hours of bed.    SHx:  \"I haven't been able to work the last 5 months due to back issues. Imbeing treated for it. \"    A/P:    1.)  High likelihood of LEON with STOP-BANG score of 6.   - Would appear to be candidate for either home sleep testing or in-lab PSG.    ---  This note was written with the assistance of the Dragon voice-dictation technology software. The final document, although reviewed, may contain errors. For corrections, please contact the office.    Yuri Palafox MD    Sleep Medicine  [place], [place]  Main Office: [phone]  [facility] [place], [place]  [address] [facility], [address]  Schedule visits: [phone]  Main Office: [phone]  Fax: [phone]    "

## 2024-04-30 NOTE — TELEPHONE ENCOUNTER
Rx sent-any further refills will need in person visit. ERROL Mcknight CNP on 4/30/2024 at 9:57 AM

## 2024-04-30 NOTE — TELEPHONE ENCOUNTER
4/17/2024  DMO ordered #40/0 of Daphne.  (OV this date)     Called [facility] to inquire about #28 Daphne received.      7 day supply.  For first fill of initial acute controlled substance- they can only fill 7 days.        They would need a remaining order for the additional #12.     Patient requesting further fill.  So I camille'd up #40/0 but adjust as you would like it.     Rebeca Vaca RN on 4/30/2024 at 9:34 AM

## 2024-05-24 ENCOUNTER — OFFICE VISIT (OUTPATIENT)
Dept: FAMILY MEDICINE | Facility: OTHER | Age: 69
End: 2024-05-24
Attending: NURSE PRACTITIONER
Payer: COMMERCIAL

## 2024-05-24 VITALS
HEIGHT: 74 IN | RESPIRATION RATE: 20 BRPM | BODY MASS INDEX: 36.45 KG/M2 | DIASTOLIC BLOOD PRESSURE: 84 MMHG | TEMPERATURE: 98 F | SYSTOLIC BLOOD PRESSURE: 138 MMHG | WEIGHT: 284 LBS | OXYGEN SATURATION: 95 % | HEART RATE: 76 BPM

## 2024-05-24 DIAGNOSIS — G89.29 CHRONIC BILATERAL LOW BACK PAIN WITHOUT SCIATICA: ICD-10-CM

## 2024-05-24 DIAGNOSIS — G89.29 CHRONIC BILATERAL THORACIC BACK PAIN: ICD-10-CM

## 2024-05-24 DIAGNOSIS — E66.812 CLASS 2 SEVERE OBESITY DUE TO EXCESS CALORIES WITH SERIOUS COMORBIDITY AND BODY MASS INDEX (BMI) OF 36.0 TO 36.9 IN ADULT (H): ICD-10-CM

## 2024-05-24 DIAGNOSIS — E66.01 CLASS 2 SEVERE OBESITY DUE TO EXCESS CALORIES WITH SERIOUS COMORBIDITY AND BODY MASS INDEX (BMI) OF 36.0 TO 36.9 IN ADULT (H): ICD-10-CM

## 2024-05-24 DIAGNOSIS — M51.34 DDD (DEGENERATIVE DISC DISEASE), THORACIC: ICD-10-CM

## 2024-05-24 DIAGNOSIS — M54.6 CHRONIC BILATERAL THORACIC BACK PAIN: ICD-10-CM

## 2024-05-24 DIAGNOSIS — Z01.818 PREOP GENERAL PHYSICAL EXAM: Primary | ICD-10-CM

## 2024-05-24 DIAGNOSIS — M54.50 CHRONIC BILATERAL LOW BACK PAIN WITHOUT SCIATICA: ICD-10-CM

## 2024-05-24 LAB
ANION GAP SERPL CALCULATED.3IONS-SCNC: 11 MMOL/L (ref 7–15)
BUN SERPL-MCNC: 21.5 MG/DL (ref 8–23)
CALCIUM SERPL-MCNC: 9.9 MG/DL (ref 8.8–10.2)
CHLORIDE SERPL-SCNC: 105 MMOL/L (ref 98–107)
CREAT SERPL-MCNC: 0.95 MG/DL (ref 0.67–1.17)
DEPRECATED HCO3 PLAS-SCNC: 24 MMOL/L (ref 22–29)
EGFRCR SERPLBLD CKD-EPI 2021: 88 ML/MIN/1.73M2
GLUCOSE SERPL-MCNC: 103 MG/DL (ref 70–99)
HGB BLD-MCNC: 15.8 G/DL (ref 13.3–17.7)
POTASSIUM SERPL-SCNC: 4.2 MMOL/L (ref 3.4–5.3)
SODIUM SERPL-SCNC: 140 MMOL/L (ref 135–145)

## 2024-05-24 PROCEDURE — 80048 BASIC METABOLIC PNL TOTAL CA: CPT | Mod: ZL | Performed by: NURSE PRACTITIONER

## 2024-05-24 PROCEDURE — 36415 COLL VENOUS BLD VENIPUNCTURE: CPT | Mod: ZL | Performed by: NURSE PRACTITIONER

## 2024-05-24 PROCEDURE — 85018 HEMOGLOBIN: CPT | Mod: ZL | Performed by: NURSE PRACTITIONER

## 2024-05-24 PROCEDURE — G0463 HOSPITAL OUTPT CLINIC VISIT: HCPCS

## 2024-05-24 PROCEDURE — 99214 OFFICE O/P EST MOD 30 MIN: CPT | Performed by: NURSE PRACTITIONER

## 2024-05-24 PROCEDURE — 93010 ELECTROCARDIOGRAM REPORT: CPT | Performed by: STUDENT IN AN ORGANIZED HEALTH CARE EDUCATION/TRAINING PROGRAM

## 2024-05-24 PROCEDURE — 93005 ELECTROCARDIOGRAM TRACING: CPT | Performed by: NURSE PRACTITIONER

## 2024-05-24 PROCEDURE — G2211 COMPLEX E/M VISIT ADD ON: HCPCS | Performed by: NURSE PRACTITIONER

## 2024-05-24 RX ORDER — HYDROCODONE BITARTRATE AND ACETAMINOPHEN 5; 325 MG/1; MG/1
1 TABLET ORAL EVERY 6 HOURS PRN
Qty: 40 TABLET | Refills: 0 | Status: SHIPPED | OUTPATIENT
Start: 2024-05-24 | End: 2024-06-07

## 2024-05-24 ASSESSMENT — PAIN SCALES - GENERAL: PAINLEVEL: MILD PAIN (2)

## 2024-05-24 NOTE — PATIENT INSTRUCTIONS

## 2024-05-24 NOTE — PROGRESS NOTES
Preoperative Evaluation  [facility]  [address]  [place] [address]  Phone: [phone]  Fax: [phone]  Primary Provider: ERROL Mcknight CNP  Pre-op Performing Provider: ERROL Mcknight CNP  May 24, 2024             5/24/2024   Surgical Information   What procedure is being done? back ablation   Facility or Hospital where procedure/surgery will be performed: [facility]   Who is doing the procedure / surgery? Dr. Shahid Godniez   Date of surgery / procedure: june 12   Time of surgery / procedure: unknown   Where do you plan to recover after surgery? at home with family     Fax number for surgical facility: [phone]    Assessment & Plan     The proposed surgical procedure is considered LOW risk.    Problem List Items Addressed This Visit          Nervous and Auditory    Chronic bilateral low back pain without sciatica    Relevant Medications    HYDROcodone-acetaminophen (NORCO) 5-325 MG tablet       Digestive    Class 2 severe obesity due to excess calories with serious comorbidity in adult (H)     Other Visit Diagnoses       Preop general physical exam    -  Primary    Relevant Orders    Basic Metabolic Panel (Completed)    Hemoglobin (Completed)    EKG 12-lead, tracing only (Same Day) (Completed)    DDD (degenerative disc disease), thoracic        Relevant Medications    HYDROcodone-acetaminophen (NORCO) 5-325 MG tablet    Chronic bilateral thoracic back pain        Relevant Medications    HYDROcodone-acetaminophen (NORCO) 5-325 MG tablet        He is optimized for upcoming procedure as requested.  Did refill hydrocodone for 40 tablets.  Plan to readdress pain management after procedure.  Expect that he will not need long-term opioid use.    The longitudinal plan of care for the diagnosis(es)/condition(s) as documented were addressed during this visit. Due to the added complexity in care, I will continue to support Jorge L in the subsequent  management and with ongoing continuity of care.     - No identified additional risk factors other than previously addressed    Preoperative Medication Instructions  Antiplatelet or Anticoagulation Medication Instructions   - Patient is on no antiplatelet or anticoagulation medications.    Additional Medication Instructions  Take all scheduled medications on the day of surgery    Recommendation  Approval given to proceed with proposed procedure, without further diagnostic evaluation.        Nichole Coats is a 69 year old, presenting for the following:  Pre-Op Exam        HPI related to upcoming procedure: He presents to clinic today for preoperative clearance for ablation.  He has had chronic low back and thoracic back pain.  Working with spine specialist as well as physical therapy.  He is currently managing pain with hydrocodone, 2 and half tablets daily.  He is aware that this will not be a long-term plan but only until surgical intervention.  He otherwise has been feeling well, no recent chest pains, shortness of breath, pulsatory symptoms or GI symptoms.        5/24/2024   Pre-Op Questionnaire   Have you ever had a heart attack or stroke? No   Have you ever had surgery on your heart or blood vessels, such as a stent placement, a coronary artery bypass, or surgery on an artery in your head, neck, heart, or legs? No   Do you have chest pain with activity? No   Do you have a history of heart failure? No   Do you currently have a cold, bronchitis or symptoms of other infection? No   Do you have a cough, shortness of breath, or wheezing? No   Do you or anyone in your family have previous history of blood clots? No   Do you or does anyone in your family have a serious bleeding problem such as prolonged bleeding following surgeries or cuts? No   Have you ever had problems with anemia or been told to take iron pills? No   Have you had any abnormal blood loss such as black, tarry or bloody stools? No   Have you ever  had a blood transfusion? No   Are you willing to have a blood transfusion if it is medically needed before, during, or after your surgery? Yes   Have you or any of your relatives ever had problems with anesthesia? No   Do you have sleep apnea, excessive snoring or daytime drowsiness? (!) YES   Do you have a CPAP machine? (!) NO not dx yet, has sleep study pending   Do you have any artifical heart valves or other implanted medical devices like a pacemaker, defibrillator, or continuous glucose monitor? No   Do you have artificial joints? No   Are you allergic to latex? No     Health Care Directive  Patient does not have a Health Care Directive or Living Will:     Preoperative Review of    reviewed - controlled substances reflected in medication list.      Status of Chronic Conditions:  See problem list for active medical problems.  Problems all longstanding and stable, except as noted/documented.  See ROS for pertinent symptoms related to these conditions.    Patient Active Problem List    Diagnosis Date Noted    Class 2 severe obesity due to excess calories with serious comorbidity in adult (H) 04/17/2024     Priority: Medium    Chronic bilateral low back pain without sciatica 12/05/2023     Priority: Medium    Rib pain on right side 12/05/2023     Priority: Medium    DDD (degenerative disc disease), lumbar 12/05/2023     Priority: Medium    Restless leg syndrome 06/06/2023     Priority: Medium    Hyperglycemia 01/19/2023     Priority: Medium    History of vasculitis of skin 01/18/2022     Priority: Medium    Peripheral polyneuropathy 01/18/2022     Priority: Medium    History of smoking 12/03/2020     Priority: Medium    Pulmonary nodules 12/03/2020     Priority: Medium    Other specified anxiety disorders 01/17/2018     Priority: Medium    Hyperlipidemia 01/17/2018     Priority: Medium    Irritable bowel syndrome 01/17/2018     Priority: Medium    H/O adenomatous polyp of colon 03/02/2016     Priority: Medium     Diverticulosis of sigmoid colon 2010     Priority: Medium      Past Medical History:   Diagnosis Date    Benign neoplasm     2010,w/ severe atypia noted on colonoscopy    Colonic polyp     Erectile dysfunction     History of vasculitis of skin     Hyperlipidemia     Kidney stones     Medullary cystic kidney     No Comments Provided    Personal history of nicotine dependence     No Comments Provided     Past Surgical History:   Procedure Laterality Date    APPENDECTOMY OPEN      No Comments Provided    AS REMOVAL OF KIDNEY STONE  2021    COLONOSCOPY      ,follow-up recommended in 6 months    COLONOSCOPY  2010,F/U     COLONOSCOPY  2016    3/7/16,F/U  tubular adenomas    COLONOSCOPY N/A 03/15/2021    F/U  h/o adenomatous polyps    URETHROPLASTY       Current Outpatient Medications   Medication Sig Dispense Refill    HYDROcodone-acetaminophen (NORCO) 5-325 MG tablet Take 1 tablet by mouth every 6 hours as needed for severe pain 40 tablet 0    Multiple Vitamin (MULTI-VITAMINS) TABS Take 1 tablet by mouth daily      rosuvastatin (CRESTOR) 5 MG tablet Take 1 tablet (5 mg) by mouth daily 90 tablet 0    triamcinolone (KENALOG) 0.1 % external ointment Apply topically 2 times daily 30 g 3    vitamin C (ASCORBIC ACID) 500 MG tablet Take 1 tablet (500 mg) by mouth daily         No Known Allergies     Social History     Tobacco Use    Smoking status: Former     Current packs/day: 0.00     Average packs/day: 0.5 packs/day for 40.0 years (20.0 ttl pk-yrs)     Types: Cigarettes     Start date: 1/3/1980     Quit date: 1/3/2020     Years since quittin.3    Smokeless tobacco: Never   Substance Use Topics    Alcohol use: Yes     Comment: 2-3 times per week     Family History   Problem Relation Age of Onset    Emphysema Mother     Arthritis Father     Other - See Comments Sister         Unknown    Other - See Comments Brother         Recovering addict    Coronary Artery  "Disease Brother 61    Colon Cancer Brother     Coronary Artery Disease Maternal Grandfather         Coronary artery disease,60- lived into 80s- smoked early    Other - See Comments Other         No FHx DM or CAD, no cancers in first degree relatives.     History   Drug Use Unknown           Objective    /84   Pulse 76   Temp 98  F (36.7  C)   Resp 20   Ht 1.88 m (6' 2\")   Wt 128.8 kg (284 lb)   SpO2 95%   BMI 36.46 kg/m     Estimated body mass index is 36.46 kg/m  as calculated from the following:    Height as of this encounter: 1.88 m (6' 2\").    Weight as of this encounter: 128.8 kg (284 lb).  Physical Exam  GENERAL: alert and no distress  NECK: no adenopathy, no asymmetry, masses, or scars  RESP: lungs clear to auscultation - no rales, rhonchi or wheezes  CV: regular rate and rhythm, normal S1 S2, no S3 or S4, no murmur, click or rub, no peripheral edema  ABDOMEN: soft, nontender, no hepatosplenomegaly, no masses and bowel sounds normal  MS: no gross musculoskeletal defects noted, no edema    Recent Labs   Lab Test 04/17/24  0820      POTASSIUM 4.7   CR 0.88        Diagnostics  Recent Results (from the past 24 hour(s))   EKG 12-lead, tracing only (Same Day)    Collection Time: 05/24/24  9:33 AM   Result Value Ref Range    Systolic Blood Pressure  mmHg    Diastolic Blood Pressure  mmHg    Ventricular Rate 70 BPM    Atrial Rate 70 BPM    RI Interval 178 ms    QRS Duration 116 ms     ms    QTc 434 ms    P Axis 47 degrees    R AXIS 48 degrees    T Axis 51 degrees    Interpretation ECG       Sinus rhythm  Normal ECG  No previous ECGs available     Basic Metabolic Panel    Collection Time: 05/24/24  9:37 AM   Result Value Ref Range    Sodium 140 135 - 145 mmol/L    Potassium 4.2 3.4 - 5.3 mmol/L    Chloride 105 98 - 107 mmol/L    Carbon Dioxide (CO2) 24 22 - 29 mmol/L    Anion Gap 11 7 - 15 mmol/L    Urea Nitrogen 21.5 8.0 - 23.0 mg/dL    Creatinine 0.95 0.67 - 1.17 mg/dL    GFR Estimate 88 >60 " mL/min/1.73m2    Calcium 9.9 8.8 - 10.2 mg/dL    Glucose 103 (H) 70 - 99 mg/dL   Hemoglobin    Collection Time: 05/24/24  9:37 AM   Result Value Ref Range    Hemoglobin 15.8 13.3 - 17.7 g/dL      EKG: appears normal, NSR, normal axis, normal intervals, no acute ST/T changes c/w ischemia, no LVH by voltage criteria, unchanged from previous tracings    Revised Cardiac Risk Index (RCRI)  The patient has the following serious cardiovascular risks for perioperative complications:   - No serious cardiac risks = 0 points     RCRI Interpretation: 0 points: Class I (very low risk - 0.4% complication rate)         Signed Electronically by: ERROL Mcknight CNP  Copy of this evaluation report is provided to requesting physician.

## 2024-05-28 LAB
ATRIAL RATE - MUSE: 70 BPM
DIASTOLIC BLOOD PRESSURE - MUSE: NORMAL MMHG
INTERPRETATION ECG - MUSE: NORMAL
P AXIS - MUSE: 47 DEGREES
PR INTERVAL - MUSE: 178 MS
QRS DURATION - MUSE: 116 MS
QT - MUSE: 402 MS
QTC - MUSE: 434 MS
R AXIS - MUSE: 48 DEGREES
SYSTOLIC BLOOD PRESSURE - MUSE: NORMAL MMHG
T AXIS - MUSE: 51 DEGREES
VENTRICULAR RATE- MUSE: 70 BPM

## 2024-06-21 ENCOUNTER — OFFICE VISIT (OUTPATIENT)
Dept: FAMILY MEDICINE | Facility: OTHER | Age: 69
End: 2024-06-21
Attending: NURSE PRACTITIONER
Payer: COMMERCIAL

## 2024-06-21 VITALS
OXYGEN SATURATION: 95 % | SYSTOLIC BLOOD PRESSURE: 142 MMHG | TEMPERATURE: 97.9 F | BODY MASS INDEX: 36.71 KG/M2 | HEART RATE: 65 BPM | RESPIRATION RATE: 14 BRPM | DIASTOLIC BLOOD PRESSURE: 79 MMHG | WEIGHT: 285.9 LBS

## 2024-06-21 DIAGNOSIS — M51.369 DDD (DEGENERATIVE DISC DISEASE), LUMBAR: ICD-10-CM

## 2024-06-21 DIAGNOSIS — F41.8 OTHER SPECIFIED ANXIETY DISORDERS: ICD-10-CM

## 2024-06-21 DIAGNOSIS — G89.29 CHRONIC BILATERAL LOW BACK PAIN WITHOUT SCIATICA: Primary | ICD-10-CM

## 2024-06-21 DIAGNOSIS — M51.34 DDD (DEGENERATIVE DISC DISEASE), THORACIC: ICD-10-CM

## 2024-06-21 DIAGNOSIS — M54.50 CHRONIC BILATERAL LOW BACK PAIN WITHOUT SCIATICA: Primary | ICD-10-CM

## 2024-06-21 LAB — CREAT UR-MCNC: 191 MG/DL

## 2024-06-21 PROCEDURE — 80360 METHYLPHENIDATE: CPT | Mod: ZL | Performed by: NURSE PRACTITIONER

## 2024-06-21 PROCEDURE — 80366 DRUG SCREENING PREGABALIN: CPT | Mod: ZL | Performed by: NURSE PRACTITIONER

## 2024-06-21 PROCEDURE — 99214 OFFICE O/P EST MOD 30 MIN: CPT | Performed by: NURSE PRACTITIONER

## 2024-06-21 PROCEDURE — G2211 COMPLEX E/M VISIT ADD ON: HCPCS | Performed by: NURSE PRACTITIONER

## 2024-06-21 PROCEDURE — G0463 HOSPITAL OUTPT CLINIC VISIT: HCPCS

## 2024-06-21 RX ORDER — ALPRAZOLAM 0.5 MG
0.5 TABLET ORAL 2 TIMES DAILY PRN
Qty: 60 TABLET | Refills: 5 | Status: SHIPPED | OUTPATIENT
Start: 2024-06-21

## 2024-06-21 RX ORDER — ALPRAZOLAM 0.5 MG
0.5 TABLET ORAL 2 TIMES DAILY PRN
COMMUNITY
Start: 2024-06-11 | End: 2024-06-21

## 2024-06-21 RX ORDER — HYDROCODONE BITARTRATE AND ACETAMINOPHEN 5; 325 MG/1; MG/1
1 TABLET ORAL 3 TIMES DAILY PRN
Qty: 90 TABLET | Refills: 0 | Status: SHIPPED | OUTPATIENT
Start: 2024-06-21 | End: 2024-07-16

## 2024-06-21 ASSESSMENT — ANXIETY QUESTIONNAIRES
8. IF YOU CHECKED OFF ANY PROBLEMS, HOW DIFFICULT HAVE THESE MADE IT FOR YOU TO DO YOUR WORK, TAKE CARE OF THINGS AT HOME, OR GET ALONG WITH OTHER PEOPLE?: SOMEWHAT DIFFICULT
GAD7 TOTAL SCORE: 1
1. FEELING NERVOUS, ANXIOUS, OR ON EDGE: SEVERAL DAYS
3. WORRYING TOO MUCH ABOUT DIFFERENT THINGS: NOT AT ALL
4. TROUBLE RELAXING: NOT AT ALL
7. FEELING AFRAID AS IF SOMETHING AWFUL MIGHT HAPPEN: NOT AT ALL
6. BECOMING EASILY ANNOYED OR IRRITABLE: NOT AT ALL
2. NOT BEING ABLE TO STOP OR CONTROL WORRYING: NOT AT ALL
GAD7 TOTAL SCORE: 1
IF YOU CHECKED OFF ANY PROBLEMS ON THIS QUESTIONNAIRE, HOW DIFFICULT HAVE THESE PROBLEMS MADE IT FOR YOU TO DO YOUR WORK, TAKE CARE OF THINGS AT HOME, OR GET ALONG WITH OTHER PEOPLE: SOMEWHAT DIFFICULT
7. FEELING AFRAID AS IF SOMETHING AWFUL MIGHT HAPPEN: NOT AT ALL
5. BEING SO RESTLESS THAT IT IS HARD TO SIT STILL: NOT AT ALL

## 2024-06-21 ASSESSMENT — PAIN SCALES - PAIN ENJOYMENT GENERAL ACTIVITY SCALE (PEG)
AVG_PAIN_PASTWEEK: 5
INTERFERED_GENERAL_ACTIVITY: 7
INTERFERED_ENJOYMENT_LIFE: 7
INTERFERED_GENERAL_ACTIVITY: 7
INTERFERED_ENJOYMENT_LIFE: 7
AVG_PAIN_PASTWEEK: 5
PEG_TOTALSCORE: 6.33
PEG_TOTALSCORE: 6.33

## 2024-06-21 ASSESSMENT — PAIN SCALES - GENERAL: PAINLEVEL: MILD PAIN (3)

## 2024-06-21 NOTE — NURSING NOTE
"Chief Complaint   Patient presents with    Refill Request     Patient here for ongoing back pain.  Initial BP (!) 142/79   Pulse 65   Temp 97.9  F (36.6  C) (Tympanic)   Resp 14   Wt 129.7 kg (285 lb 14.4 oz)   SpO2 95%   BMI 36.71 kg/m   Estimated body mass index is 36.71 kg/m  as calculated from the following:    Height as of 5/24/24: 1.88 m (6' 2\").    Weight as of this encounter: 129.7 kg (285 lb 14.4 oz).  Medication Review: complete    The next two questions are to help us understand your food security.  If you are feeling you need any assistance in this area, we have resources available to support you today.          4/17/2024   SDOH- Food Insecurity   Within the past 12 months, did you worry that your food would run out before you got money to buy more? N   Within the past 12 months, did the food you bought just not last and you didn t have money to get more? N            Health Care Directive:  Patient does not have a Health Care Directive or Living Will: Discussed advance care planning with patient; however, patient declined at this time.    Cheryl Avalos MA      "

## 2024-06-21 NOTE — LETTER
Opioid / Opioid Plus Controlled Substance Agreement    This is an agreement between you and your provider about the safe and appropriate use of controlled substance/opioids prescribed by your care team. Controlled substances are medicines that can cause physical and mental dependence (abuse).    There are strict laws about having and using these medicines. We here at [facility] are committing to working with you in your efforts to get better. To support you in this work, we ll help you schedule regular office appointments for medicine refills. If we must cancel or change your appointment for any reason, we ll make sure you have enough medicine to last until your next appointment.     As a Provider, I will:  Listen carefully to your concerns and treat you with respect.   Recommend a treatment plan that I believe is in your best interest. This plan may involve therapies other than opioid pain medication.   Talk with you often about the possible benefits, and the risk of harm of any medicine that we prescribe for you.   Provide a plan on how to taper (discontinue or go off) using this medicine if the decision is made to stop its use.    As a Patient, I understand that opioid(s):   Are a controlled substance prescribed by my care team to help me function or work and manage my condition(s).   Are strong medicines and can cause serious side effects such as:  Drowsiness, which can seriously affect my driving ability  A lower breathing rate, enough to cause death  Harm to my thinking ability   Depression   Abuse of and addiction to this medicine  Need to be taken exactly as prescribed. Combining opioids with certain medicines or chemicals (such as illegal drugs, sedatives, sleeping pills, and benzodiazepines) can be dangerous or even fatal. If I stop opioids suddenly, I may have severe withdrawal symptoms.  Do not work for all types of pain nor for all patients. If they re not helpful, I may be asked to stop  them.        The risks, benefits and side effects of these medicine(s) were explained to me. I agree that:  I will take part in other treatments as advised by my care team. This may be psychiatry or counseling, physical therapy, behavioral therapy, group treatment or a referral to a specialist.     I will keep all my appointments. I understand that this is part of the monitoring of opioids. My care team may require an office visit for EVERY opioid/controlled substance refill. If I miss appointments or don t follow instructions, my care team may stop my medicine.    I will take my medicines as prescribed. I will not change the dose or schedule unless my care team tells me to. There will be no refills if I run out early.     I may be asked to come to the clinic and complete a urine drug test or complete a pill count at any time. If I don t give a urine sample or participate in a pill count, the care team may stop my medicine.    I will only receive prescriptions from this clinic for chronic pain. If I am treated by another provider for acute pain issues, I will tell them that I am taking opioid pain medication for chronic pain and that I have a treatment agreement with this provider. I will inform my [facility] care team within one business day if I am given a prescription for any pain medication by another healthcare provider. My [facility] care team can contact other providers and pharmacists about my use of any medicines.    It is up to me to make sure that I don t run out of my medicines on weekends or holidays. If my care team is willing to refill my opioid prescription without a visit, I must request refills only during office hours. Refills may take up to 3 business days to process. I will use one pharmacy to fill all my opioid and other controlled substance prescriptions. I will notify the clinic about any changes to my insurance or medication availability.    I am responsible for my  prescriptions. If the medicine/prescription is lost, stolen or destroyed, it will not be replaced. I also agree not to share controlled substance medicines with anyone.    I am aware I should not use any illegal or recreational drugs. I agree not to drink alcohol unless my care team says I can.       If I enroll in the [place] Medical Cannabis program, I will tell my care team prior to my next refill.     I will tell my care team right away if I become pregnant, have a new medical problem treated outside of my regular clinic, or have a change in my medications.    I understand that this medicine can affect my thinking, judgment and reaction time. Alcohol and drugs affect the brain and body, which can affect the safety of my driving. Being under the influence of alcohol or drugs can affect my decision-making, behaviors, personal safety, and the safety of others. Driving while impaired (DWI) can occur if a person is driving, operating, or in physical control of a car, motorcycle, boat, snowmobile, ATV, motorbike, off-road vehicle, or any other motor vehicle ([place] Statute 169A.20). I understand the risk if I choose to drive or operate any vehicle or machinery.    I understand that if I do not follow any of the conditions above, my prescriptions or treatment may be stopped or changed.          Opioids  What You Need to Know    What are opioids?   Opioids are pain medicines that must be prescribed by a doctor. They are also known as narcotics.     Examples are:   morphine (MS Contin, Mahsa)  oxycodone (Oxycontin)  oxycodone and acetaminophen (Percocet)  hydrocodone and acetaminophen (Vicodin, Norco)   fentanyl patch (Duragesic)   hydromorphone (Dilaudid)   methadone  codeine (Tylenol #3)     What do opioids do well?   Opioids are best for severe short-term pain such as after a surgery or injury. They may work well for cancer pain. They may help some people with long-lasting (chronic) pain.     What do opioids NOT do  well?   Opioids never get rid of pain entirely, and they don t work well for most patients with chronic pain. Opioids don t reduce swelling, one of the causes of pain.                                    Other ways to manage chronic pain and improve function include:     Treat the health problem that may be causing pain  Anti-inflammation medicines, which reduce swelling and tenderness, such as ibuprofen (Advil, Motrin) or naproxen (Aleve)  Acetaminophen (Tylenol)  Antidepressants and anti-seizure medicines, especially for nerve pain  Topical treatments such as patches or creams  Injections or nerve blocks  Chiropractic or osteopathic treatment  Acupuncture, massage, deep breathing, meditation, visual imagery, aromatherapy  Use heat or ice at the pain site  Physical therapy   Exercise  Stop smoking  Take part in therapy       Risks and side effects     Talk to your doctor before you start or decide to keep taking opioids. Possible side effects include:    Lowering your breathing rate enough to cause death  Overdose, including death, especially if taking higher than prescribed doses  Worse depression symptoms; less pleasure in things you usually enjoy  Feeling tired or sluggish  Slower thoughts or cloudy thinking  Being more sensitive to pain over time; pain is harder to control  Trouble sleeping or restless sleep  Changes in hormone levels (for example, less testosterone)  Changes in sex drive or ability to have sex  Constipation  Unsafe driving  Itching and sweating  Dizziness  Nausea, throwing up and dry mouth    What else should I know about opioids?    Opioids may lead to dependence, tolerance, or addiction.    Dependence means that if you stop or reduce the medicine too quickly, you will have withdrawal symptoms. These include loose poop (diarrhea), jitters, flu-like symptoms, nervousness and tremors. Dependence is not the same as addiction.                     Tolerance means needing higher doses over time to  get the same effect. This may increase the chance of serious side effects.    Addiction is when people improperly use a substance that harms their body, their mind or their relations with others. Use of opiates can cause a relapse of addiction if you have a history of drug or alcohol abuse.    People who have used opioids for a long time may have a lower quality of life, worse depression, higher levels of pain and more visits to doctors.    You can overdose on opioids. Take these steps to lower your risk of overdose:    Recognize the signs:  Signs of overdose include decrease or loss of consciousness (blackout), slowed breathing, trouble waking up and blue lips. If someone is worried about overdose, they should call 911.    Talk to your doctor about Narcan (naloxone).   If you are at risk for overdose, you may be given a prescription for Narcan. This medicine very quickly reverses the effects of opioids.   If you overdose, a friend or family member can give you Narcan while waiting for the ambulance. They need to know the signs of overdose and how to give Narcan.     Don't use alcohol or street drugs.   Taking them with opioids can cause death.    Do not take any of these medicines unless your doctor says it s OK. Taking these with opioids can cause death:  Benzodiazepines, such as lorazepam (Ativan), alprazolam (Xanax) or diazepam (Valium)  Muscle relaxers, such as cyclobenzaprine (Flexeril)  Sleeping pills like zolpidem (Ambien)   Other opioids      How to keep you and other people safe while taking opioids:    Never share your opioids with others.  Opioid medicines are regulated by the Drug Enforcement Agency (LENY). Selling or sharing medications is a criminal act.    2. Be sure to store opioids in a secure place, locked up if possible. Young children can easily swallow them and overdose.    3. When you are traveling with your medicines, keep them in the original bottles. If you use a pill box, be sure you also  carry a copy of your medicine list from your clinic or pharmacy.    4. Safe disposal of opioids    Most pharmacies have places to get rid of medicine, called disposal kiosks. Medicine disposal options are also available in every [facility]. Search your county and  medication disposal  to find more options. You can find more details at:  https://[email or web address].[facility].[place]./living-green/managing-unwanted-medications     I agree that my provider, clinic care team, and pharmacy may work with any city, state or federal law enforcement agency that investigates the misuse, sale, or other diversion of my controlled medicine. I will allow my provider to discuss my care with, or share a copy of, this agreement with any other treating provider, pharmacy or emergency room where I receive care.    I have read this agreement and have asked questions about anything I did not understand.    _______________________________________________________  Patient Signature - Garland Rice _____________________                   Date     _______________________________________________________  Provider Signature - ERROL Mcknight CNP   _____________________                   Date     _______________________________________________________  Witness Signature (required if provider not present while patient signing)   _____________________                   Date

## 2024-06-21 NOTE — PROGRESS NOTES
Assessment & Plan   Problem List Items Addressed This Visit          Nervous and Auditory    Chronic bilateral low back pain without sciatica - Primary    Relevant Medications    HYDROcodone-acetaminophen (NORCO) 5-325 MG tablet    Other Relevant Orders    Drug Confirmation Panel Urine with Creat       Musculoskeletal and Integumentary    DDD (degenerative disc disease), lumbar    Relevant Medications    HYDROcodone-acetaminophen (NORCO) 5-325 MG tablet    Other Relevant Orders    Drug Confirmation Panel Urine with Creat       Behavioral    Other specified anxiety disorders    Relevant Medications    ALPRAZolam (XANAX) 0.5 MG tablet     Other Visit Diagnoses       DDD (degenerative disc disease), thoracic        Relevant Medications    HYDROcodone-acetaminophen (NORCO) 5-325 MG tablet    Other Relevant Orders    Drug Confirmation Panel Urine with Creat             Discussed due to needing further medication, at this time we need to treat this as chronic pain management, controlled substance agreement updated today, urine drug screen updated in [place]  reviewed and as expected.  Refilled hydrocodone x 1 month. Hopefully he will have improvement in pain and will not need ongoing pain management.  Narcotics.  We did discuss concerns of benzodiazepines and opioids.  He is aware that he will need to be seen in office for any refills.       The longitudinal plan of care for the diagnosis(es)/condition(s) as documented were addressed during this visit. Due to the added complexity in care, I will continue to support Jorge L in the subsequent management and with ongoing continuity of care.      No follow-ups on file.      Nichole Coats is a 69 year old, presenting for the following health issues:  Refill Request        6/21/2024    12:42 PM   Additional Questions   Roomed by Cheryl MCKEE CMA     History of Present Illness       Back Pain:  He presents for follow up of back pain. Patient's back pain is a chronic  problem.  Location of back pain:  Right lower back, left lower back, right middle of back, left middle of back, right upper back, left upper back, right side of neck, left side of neck, right shoulder, left shoulder, right side of waist, left side of waist and other  Description of back pain: burning, cramping, dull ache, gnawing, sharp, shooting and stabbing  Back pain spreads: right thigh, left thigh, right shoulder, left shoulder, right side of neck and left side of neck    Since patient first noticed back pain, pain is: gradually worsening  Does back pain interfere with his job:  Yes       He eats 2-3 servings of fruits and vegetables daily.He consumes 1 sweetened beverage(s) daily.He exercises with enough effort to increase his heart rate 60 or more minutes per day.  He exercises with enough effort to increase his heart rate 7 days per week.   He is taking medications regularly.         Pain History:  When did you first notice your pain? A year ago   Have you seen this provider for your pain in the past? Yes   Where in your body do you have pain? back  Are you seeing anyone else for your pain? No          12/7/2018     2:50 PM 6/6/2023    11:13 AM 6/21/2024    12:31 PM   SHARAD-7 SCORE   Total Score  3 (minimal anxiety) 1 (minimal anxiety)   Total Score 0 3 1         PDMP Review         Value Time User    State PDMP site checked  Yes 6/21/2024  1:12 PM Jade Quinn APRN CNP          Last CSA Agreement:   CSA -- Patient Level:     [Media Unavailable] Controlled Substance Agreement - Opioid - Scan on 6/24/2024  7:26 AM       Last UDS: 6/21/2024    He comes in today for refill of pain medication.  He has been working with spine specialist, had ablation without relief.  He is to call the specialist on Wednesday for an update, expect a couple weeks before no improvement.  He is taking 2 and half hydrocodone a day.  Does take Xanax twice daily, has been on this for over 15 years.  He is spacing these apart about 1  to 2 hours.      Objective    BP (!) 142/79   Pulse 65   Temp 97.9  F (36.6  C) (Tympanic)   Resp 14   Wt 129.7 kg (285 lb 14.4 oz)   SpO2 95%   BMI 36.71 kg/m    Body mass index is 36.71 kg/m .  Physical Exam   GENERAL: alert and no distress  EYES: Eyes grossly normal to inspection  NEURO: Normal strength and tone, mentation intact and speech normal  PSYCH: mentation appears normal, affect normal/bright            Signed Electronically by: ERROL Mcknight CNP

## 2024-06-27 ENCOUNTER — MYC MEDICAL ADVICE (OUTPATIENT)
Dept: FAMILY MEDICINE | Facility: OTHER | Age: 69
End: 2024-06-27
Payer: COMMERCIAL

## 2024-06-27 LAB
A-OH ALPRAZ UR QL CFM: PRESENT
ALPRAZ UR QL CFM: PRESENT
DHC UR CFM-MCNC: 469 NG/ML
DHC/CREAT UR: 246 NG/MG {CREAT}
HYDROCODONE UR CFM-MCNC: 739 NG/ML
HYDROCODONE/CREAT UR: 387 NG/MG {CREAT}
HYDROMORPHONE UR CFM-MCNC: 354 NG/ML
HYDROMORPHONE/CREAT UR: 185 NG/MG {CREAT}

## 2024-06-27 NOTE — TELEPHONE ENCOUNTER
"6/21/24  OV with Jade Quinn for back pain.      Patient is concerned about \"abnormal results\" on urine drug panel.     My Thoughts:  Jade Quinn is out of the clinic until 7/2 at which time she will be reviewing and commenting on labs that were resulted while she was out.  These test results can be confusing to patient's as the \"abnormals\" are often  medications that you were prescribed but that aren't found in \"normal\" urine, if that makes sense.  Try not to be worried about the abnormals!  This is just to make sure that you are taking the medications you are prescribed and not taking controlled substances that you aren't prescribed.  We'll be in touch next week with official results from Jade.      Rebeca Vaca RN on 6/27/2024 at 2:36 PM    "

## 2024-07-05 NOTE — PROGRESS NOTES
"Garland Rice is a 69 year old male who is being evaluated via in-person clinic visit.       Visit Details     In-clinic visit for concerns for sleep disordered breathing.     A/P:     1.)  High likelihood of LEON with STOP-BANG score of 6.   - Would appear to be candidate for either home sleep testing or in-lab PSG.    We agreed to proceed with [facility] home sleep testing, orders placed today.  He is comfortable with receiving initial results via MyChart.  He seems to deny any specific concerns with his sleep or daytime functioning, so treatment likely would be recommended if there is felt to be a long-term cardiovascular risk benefit.      SUBJECTIVE:  Garland Rice is a 69 year old male.    69 year old male who is referred for insomnia.     Pertinent PMHx of obesity, anxiety.     STOP-BANG score of 6, with unknown neck circumference.  Clemmons score of 13.  CHAVO: 14     BMI of Estimated body mass index is 37.23 kg/m  as calculated from the following:    Height as of 4/17/24: 1.88 m (6' 2\").    Weight as of 4/17/24: 131.5 kg (290 lb).      Today -he presents today under urging from his spouse to be evaluated for sleep apnea he does not identify any concerns, but reports that he has been told that he snores and frequently stops breathing while sleeping.  But, he does acknowledge that he has been more tired during the day, though he does associate this with his worsening back pain that led him to stop working in December and the interim 30 pound weight gain.    He has historically slept in a somewhat bimodal pattern.  When working he would often initiate sleep between 7-8 PM and then awaken between 2-3 AM.  Now that he has not been working, bedtime is closer to 9 PM with similar awakening around 2-3 AM and then he will then have a longer nap in the early afternoon.    Chief concern per questionnaire: \"Broken up sleep pattern. Sleeping 4-5 hours at night and then a nap mid morning. Not feeling well rested. " "My wife says I stop breathing at times archie snore \"     Duration of symptoms:  \"Sept. 2024 \"     Goals for visit per questionnaire: \"To get back to restful sleep. \"     Sleep pattern:  Workdays.  9pm to 2-3am.  Weekends.  9pm to 2-3am.  Time to fall asleep: ~few minutes.  Awakenings: 5-10 times per night, ? minutes to return to sleep.  Average total sleep time:  ? hours  Napping.  7 days per week, ? hours per nap.     Yes for RLS screen.  No for sleep walking.  No for dream enactment behavior.  No for bruxism.     No for morning headaches.  Yes for snoring.  Yes for observed apnea.  No for FHx of LEON.     Caffeine use:  ? for 3+ per day.  No for within 6 hours of bed.     SHx:  \"I haven't been able to work the last 5 months due to back issues. Imbeing treated for it. \"      Past medical history:    Patient Active Problem List    Diagnosis Date Noted    Class 2 severe obesity due to excess calories with serious comorbidity in adult (H) 04/17/2024     Priority: Medium    Chronic bilateral low back pain without sciatica 12/05/2023     Priority: Medium    Rib pain on right side 12/05/2023     Priority: Medium    DDD (degenerative disc disease), lumbar 12/05/2023     Priority: Medium    Restless leg syndrome 06/06/2023     Priority: Medium    Hyperglycemia 01/19/2023     Priority: Medium    History of vasculitis of skin 01/18/2022     Priority: Medium    Peripheral polyneuropathy 01/18/2022     Priority: Medium    History of smoking 12/03/2020     Priority: Medium    Pulmonary nodules 12/03/2020     Priority: Medium    Other specified anxiety disorders 01/17/2018     Priority: Medium    Hyperlipidemia 01/17/2018     Priority: Medium    Irritable bowel syndrome 01/17/2018     Priority: Medium    H/O adenomatous polyp of colon 03/02/2016     Priority: Medium    Diverticulosis of sigmoid colon 01/11/2010     Priority: Medium       10 point ROS of systems including Constitutional, Eyes, Respiratory, Cardiovascular, " Gastroenterology, Genitourinary, Integumentary, Muscularskeletal, Psychiatric were all negative except for pertinent positives noted in my HPI.    Current Outpatient Medications   Medication Sig Dispense Refill    ALPRAZolam (XANAX) 0.5 MG tablet Take 1 tablet (0.5 mg) by mouth 2 times daily as needed for anxiety 60 tablet 5    HYDROcodone-acetaminophen (NORCO) 5-325 MG tablet Take 1 tablet by mouth 3 times daily as needed for severe pain 90 tablet 0    Multiple Vitamin (MULTI-VITAMINS) TABS Take 1 tablet by mouth daily      rosuvastatin (CRESTOR) 5 MG tablet Take 1 tablet (5 mg) by mouth daily 90 tablet 0    triamcinolone (KENALOG) 0.1 % external ointment Apply topically 2 times daily 30 g 3    vitamin C (ASCORBIC ACID) 500 MG tablet Take 1 tablet (500 mg) by mouth daily         OBJECTIVE:  There were no vitals taken for this visit.    Physical Exam     ---  This note was written with the assistance of the Dragon voice-dictation technology software. The final document, although reviewed, may contain errors. For corrections, please contact the office.    Total time spent preparing to see the patient, review of chart, obtaining history and physical examination, review of sleep testing, review of treatment options, education, discussion with patient and documenting in Epic / EMR was 25 minutes.  All time involved was spent on the day of service for the patient (the same day as the patient's appointment).    Yuri Palafox MD    Sleep Medicine  [place], [place]  Main Office: [phone]  [facility], [facility] [address], [address]  Schedule visits: [phone]  Main Office: [phone]  Fax: [phone]

## 2024-07-08 ENCOUNTER — OFFICE VISIT (OUTPATIENT)
Dept: FAMILY MEDICINE | Facility: OTHER | Age: 69
End: 2024-07-08
Attending: FAMILY MEDICINE
Payer: COMMERCIAL

## 2024-07-08 DIAGNOSIS — E66.812 CLASS 2 SEVERE OBESITY DUE TO EXCESS CALORIES WITH SERIOUS COMORBIDITY AND BODY MASS INDEX (BMI) OF 36.0 TO 36.9 IN ADULT (H): ICD-10-CM

## 2024-07-08 DIAGNOSIS — R06.81 APNEA: ICD-10-CM

## 2024-07-08 DIAGNOSIS — E66.01 CLASS 2 SEVERE OBESITY DUE TO EXCESS CALORIES WITH SERIOUS COMORBIDITY AND BODY MASS INDEX (BMI) OF 36.0 TO 36.9 IN ADULT (H): ICD-10-CM

## 2024-07-08 DIAGNOSIS — R06.83 SNORING: Primary | ICD-10-CM

## 2024-07-08 PROCEDURE — G0463 HOSPITAL OUTPT CLINIC VISIT: HCPCS

## 2024-07-08 PROCEDURE — 99214 OFFICE O/P EST MOD 30 MIN: CPT | Performed by: FAMILY MEDICINE

## 2024-07-08 NOTE — PATIENT INSTRUCTIONS
Hello,    It was a pleasure to meet you today.  Here is a summary of our plan:    Our plan was to proceed with scheduling the home sleep test that goes on the finger.  Our scheduling number is [phone].      Yuri Palafox MD    Sleep Medicine  [place], [place]  Main Office: [phone]  [facility] [address], [address]  Schedule visits: [phone]  Main Office: [phone]  Fax: [phone]

## 2024-07-08 NOTE — PROGRESS NOTES
Sleep Medicine Clinic Rooming Note    Patient was identified appropriately by name and date of birth.    Medication Reconciliation: complete    Chief complaint: witnessed apnea      Height: 74 ft/inches  Weight: 280 lbs  SpO2: 95  HR: 73  BP: 140/86  Neck circumference: 18.5 inches     [place] Sleepiness Scale    How likely are you to doze off or fall asleep in the following situations, in contrast to just feeling tired?    This refers to your usual way of life recently.    Even if you haven't done some of these things recently, try to figure out how they would have affected you.    Use the following scale to choose the most appropriate number for each situation:  0 = NO CHANCE of dozing  1 = SLIGHT CHANCE of dozing  2 = MODERATE CHANCE of dozing  3 = HIGH CHANCE of dozing    Sitting and Readin  Watching television: 2  Sitting inactive in a public place:0  Riding in car:1  Laying down to rest in the afternoon:3  Sitting and talking to someone:0  Sitting quietly after lunch without alcohol:3  In a car, stopped in traffic for a few minutes:0    Score: 10    DME needs: ***    Additional Notes: ***

## 2024-07-16 ENCOUNTER — OFFICE VISIT (OUTPATIENT)
Dept: FAMILY MEDICINE | Facility: OTHER | Age: 69
End: 2024-07-16
Attending: NURSE PRACTITIONER
Payer: COMMERCIAL

## 2024-07-16 VITALS
HEIGHT: 74 IN | DIASTOLIC BLOOD PRESSURE: 86 MMHG | WEIGHT: 286.4 LBS | SYSTOLIC BLOOD PRESSURE: 138 MMHG | BODY MASS INDEX: 36.76 KG/M2 | RESPIRATION RATE: 20 BRPM | HEART RATE: 92 BPM | OXYGEN SATURATION: 95 %

## 2024-07-16 DIAGNOSIS — G89.29 CHRONIC BILATERAL LOW BACK PAIN WITHOUT SCIATICA: ICD-10-CM

## 2024-07-16 DIAGNOSIS — F11.90 CHRONIC, CONTINUOUS USE OF OPIOIDS: Primary | ICD-10-CM

## 2024-07-16 DIAGNOSIS — M54.50 CHRONIC BILATERAL LOW BACK PAIN WITHOUT SCIATICA: ICD-10-CM

## 2024-07-16 DIAGNOSIS — M51.34 DDD (DEGENERATIVE DISC DISEASE), THORACIC: ICD-10-CM

## 2024-07-16 PROCEDURE — G2211 COMPLEX E/M VISIT ADD ON: HCPCS | Performed by: NURSE PRACTITIONER

## 2024-07-16 PROCEDURE — G0463 HOSPITAL OUTPT CLINIC VISIT: HCPCS

## 2024-07-16 PROCEDURE — 99214 OFFICE O/P EST MOD 30 MIN: CPT | Performed by: NURSE PRACTITIONER

## 2024-07-16 RX ORDER — HYDROCODONE BITARTRATE AND ACETAMINOPHEN 5; 325 MG/1; MG/1
1 TABLET ORAL 3 TIMES DAILY PRN
Qty: 90 TABLET | Refills: 0 | Status: SHIPPED | OUTPATIENT
Start: 2024-08-15 | End: 2024-09-13

## 2024-07-16 RX ORDER — HYDROCODONE BITARTRATE AND ACETAMINOPHEN 5; 325 MG/1; MG/1
1 TABLET ORAL 3 TIMES DAILY PRN
Qty: 90 TABLET | Refills: 0 | Status: SHIPPED | OUTPATIENT
Start: 2024-07-16 | End: 2024-09-13

## 2024-07-16 ASSESSMENT — ANXIETY QUESTIONNAIRES
IF YOU CHECKED OFF ANY PROBLEMS ON THIS QUESTIONNAIRE, HOW DIFFICULT HAVE THESE PROBLEMS MADE IT FOR YOU TO DO YOUR WORK, TAKE CARE OF THINGS AT HOME, OR GET ALONG WITH OTHER PEOPLE: NOT DIFFICULT AT ALL
GAD7 TOTAL SCORE: 2
GAD7 TOTAL SCORE: 2
1. FEELING NERVOUS, ANXIOUS, OR ON EDGE: SEVERAL DAYS
2. NOT BEING ABLE TO STOP OR CONTROL WORRYING: NOT AT ALL
5. BEING SO RESTLESS THAT IT IS HARD TO SIT STILL: NOT AT ALL
4. TROUBLE RELAXING: NOT AT ALL
8. IF YOU CHECKED OFF ANY PROBLEMS, HOW DIFFICULT HAVE THESE MADE IT FOR YOU TO DO YOUR WORK, TAKE CARE OF THINGS AT HOME, OR GET ALONG WITH OTHER PEOPLE?: NOT DIFFICULT AT ALL
7. FEELING AFRAID AS IF SOMETHING AWFUL MIGHT HAPPEN: NOT AT ALL
GAD7 TOTAL SCORE: 2
6. BECOMING EASILY ANNOYED OR IRRITABLE: SEVERAL DAYS
7. FEELING AFRAID AS IF SOMETHING AWFUL MIGHT HAPPEN: NOT AT ALL
3. WORRYING TOO MUCH ABOUT DIFFERENT THINGS: NOT AT ALL

## 2024-07-16 ASSESSMENT — PATIENT HEALTH QUESTIONNAIRE - PHQ9
SUM OF ALL RESPONSES TO PHQ QUESTIONS 1-9: 2
SUM OF ALL RESPONSES TO PHQ QUESTIONS 1-9: 2

## 2024-07-16 ASSESSMENT — PAIN SCALES - GENERAL: PAINLEVEL: MILD PAIN (3)

## 2024-07-16 NOTE — Clinical Note
preop Detail Level: Detailed Was A Bandage Applied: Yes Punch Size In Mm: 3 Size Of Lesion In Cm (Optional): 0 Depth Of Punch Biopsy: dermis Biopsy Type: H and E Anesthesia Type: 1% lidocaine with epinephrine Anesthesia Volume In Cc: 0.5 Hemostasis: Pressure Epidermal Sutures: gel foam Wound Care: Petrolatum Dressing: bandage Patient Will Remove Sutures At Home?: No Lab: [id number] Lab Facility: [id number] Consent: Written consent was obtained and risks were reviewed including but not limited to scarring, infection, bleeding, scabbing, incomplete removal, nerve damage and allergy to anesthesia. Post-Care Instructions: I reviewed with the patient in detail post-care instructions. Patient is to keep the biopsy site dry overnight, and then apply bacitracin twice daily until healed. Patient may apply hydrogen peroxide soaks to remove any crusting. Home Suture Removal Text: Patient was provided a home suture removal kit and will remove their sutures at home.  If they have any questions or difficulties they will call the office. Notification Instructions: Patient will be notified of biopsy results. However, patient instructed to call the office if not contacted within 2 weeks. Billing Type: Third-Party Bill Information: Selecting Yes will display possible errors in your note based on the variables you have selected. This validation is only offered as a suggestion for you. PLEASE NOTE THAT THE VALIDATION TEXT WILL BE REMOVED WHEN YOU FINALIZE YOUR NOTE. IF YOU WANT TO FAX A PRELIMINARY NOTE YOU WILL NEED TO TOGGLE THIS TO 'NO' IF YOU DO NOT WANT IT IN YOUR FAXED NOTE.

## 2024-07-16 NOTE — PROGRESS NOTES
Assessment & Plan   Problem List Items Addressed This Visit          Nervous and Auditory    Chronic bilateral low back pain without sciatica    Relevant Medications    HYDROcodone-acetaminophen (NORCO) 5-325 MG tablet    HYDROcodone-acetaminophen (NORCO) 5-325 MG tablet (Start on 8/15/2024)     Other Visit Diagnoses       Chronic, continuous use of opioids    -  Primary    Relevant Medications    naloxone (NARCAN) 4 MG/0.1ML nasal spray    HYDROcodone-acetaminophen (NORCO) 5-325 MG tablet    HYDROcodone-acetaminophen (NORCO) 5-325 MG tablet (Start on 8/15/2024)    DDD (degenerative disc disease), thoracic        Relevant Medications    HYDROcodone-acetaminophen (NORCO) 5-325 MG tablet    HYDROcodone-acetaminophen (NORCO) 5-325 MG tablet (Start on 8/15/2024)           Continues with chronic thoracic and low back pain despite recent ablation.  Continues to use hydrocodone 2-3 times daily to help take the edge off and manage pain.  We discussed that I would like to see further intervention as I am not wanting him to be on long-term narcotics especially in light of his chronic benzodiazepines.  We discussed side effects and risk with these medications.  [facility] reviewed and as expected.  Controlled substance agreement and urine drug screen are up-to-date.  Refilled hydrocodone x 2 months.  He will follow-up with spine specialist in 2 weeks as requested.  If he is not getting plans for next steps of further treatment options, encouraged him to consider second opinion.  He will let me know if he needs referral for this.  Narcan ordered due to use of hydrocodone and benzodiazepines.    The longitudinal plan of care for the diagnosis(es)/condition(s) as documented were addressed during this visit. Due to the added complexity in care, I will continue to support Jorge L in the subsequent management and with ongoing continuity of care.      No follow-ups on file.      Nichole Coats is a 69 year old, presenting for  the following health issues:  Medication Therapy Management    History of Present Illness       Reason for visit:  Presciption request    He eats 2-3 servings of fruits and vegetables daily.He consumes 0 sweetened beverage(s) daily.He exercises with enough effort to increase his heart rate 30 to 60 minutes per day.  He exercises with enough effort to increase his heart rate 6 days per week.   He is taking medications regularly.     Pain History:  When did you first notice your pain? A year ago   Have you seen this provider for your pain in the past? Yes   Where in your body do you have pain? back  Are you seeing anyone else for your pain? Yes - Dr Godinez        10/17/2018     7:40 AM 12/7/2018     2:50 PM 7/16/2024     2:33 PM   PHQ-9 SCORE   PHQ-9 Total Score MyChart   2 (Minimal depression)   PHQ-9 Total Score 0 0 2           6/6/2023    11:13 AM 6/21/2024    12:31 PM 7/16/2024     2:34 PM   SHARAD-7 SCORE   Total Score 3 (minimal anxiety) 1 (minimal anxiety) 2 (minimal anxiety)   Total Score 3 1 2       Chronic Pain Follow Up:    Location of pain: back-mid and low  Analgesia/pain control:    - Recent changes:  none    - Overall control: Tolerable with discomfort    - Current treatments: hydrocodone/apap, heating pad, icy hot  Adherence:     - Do you ever take more pain medicine than prescribed? No    - When did you take your last dose of pain medicine?  This am   Adverse effects: Yes: nausea   PDMP Review         Value Time User    State PDMP site checked  Yes 7/16/2024  2:45 PM Jade Quinn APRN CNP          Last CSA Agreement:   CSA -- Patient Level:     [Media Unavailable] Controlled Substance Agreement - Opioid - Scan on 6/24/2024  7:26 AM       Last UDS: 6/27/2024    He called surgical office yesterday, told that he was having very slight improvement to back pain.  They recommend calling back again in 2 weeks, this would be the 8-week teresa to see how he is doing and discuss next steps.  Currently taking  "pain medication 2-3 times daily.          Objective    /86   Pulse 92   Resp 20   Ht 1.88 m (6' 2\")   Wt 129.9 kg (286 lb 6.4 oz)   SpO2 95%   BMI 36.77 kg/m    Body mass index is 36.77 kg/m .  Physical Exam   GENERAL: alert and no distress  RESP: lungs clear to auscultation - no rales, rhonchi or wheezes  CV: regular rate and rhythm, normal S1 S2  NEURO: Normal strength and tone, mentation intact and speech normal  PSYCH: mentation appears normal, affect normal/bright            Signed Electronically by: ERROL Mcknight CNP    "

## 2024-07-16 NOTE — NURSING NOTE
Patient presents today for follow up on chronic pain.    Medication Reconciliation Complete    Sandra Cortes LPN  7/16/2024 2:35 PM

## 2024-07-23 ENCOUNTER — VIRTUAL VISIT (OUTPATIENT)
Dept: SLEEP MEDICINE | Facility: HOSPITAL | Age: 69
End: 2024-07-23
Attending: FAMILY MEDICINE
Payer: COMMERCIAL

## 2024-07-23 DIAGNOSIS — G47.33 OSA (OBSTRUCTIVE SLEEP APNEA): Primary | ICD-10-CM

## 2024-07-23 NOTE — PROGRESS NOTES
Patient was provided both verbal and written education and instructions on use of Watch PAT device. Watch PAT device has been registered and shipped via [employer] on 7/23/2024. Patient was notified that package was mailed out. Watch PAT serial number: [id number]    Tracking # [id number].

## 2024-08-06 ENCOUNTER — MYC MEDICAL ADVICE (OUTPATIENT)
Dept: FAMILY MEDICINE | Facility: OTHER | Age: 69
End: 2024-08-06
Payer: COMMERCIAL

## 2024-08-06 ENCOUNTER — DOCUMENTATION ONLY (OUTPATIENT)
Dept: SLEEP MEDICINE | Facility: HOSPITAL | Age: 69
End: 2024-08-06
Attending: FAMILY MEDICINE
Payer: COMMERCIAL

## 2024-08-06 DIAGNOSIS — E66.01 CLASS 2 SEVERE OBESITY DUE TO EXCESS CALORIES WITH SERIOUS COMORBIDITY AND BODY MASS INDEX (BMI) OF 36.0 TO 36.9 IN ADULT (H): ICD-10-CM

## 2024-08-06 DIAGNOSIS — R06.83 SNORING: ICD-10-CM

## 2024-08-06 DIAGNOSIS — R06.81 APNEA: ICD-10-CM

## 2024-08-06 DIAGNOSIS — E66.812 CLASS 2 SEVERE OBESITY DUE TO EXCESS CALORIES WITH SERIOUS COMORBIDITY AND BODY MASS INDEX (BMI) OF 36.0 TO 36.9 IN ADULT (H): ICD-10-CM

## 2024-08-06 PROCEDURE — 95800 SLP STDY UNATTENDED: CPT

## 2024-08-06 PROCEDURE — 95800 SLP STDY UNATTENDED: CPT | Mod: 26 | Performed by: FAMILY MEDICINE

## 2024-08-06 NOTE — PROGRESS NOTES
WatchPAT data has been received and has been scored using rule 1B, 4%. Patient to follow up with provider to determine appropriate therapy.    Pat AHI: 44.6    Ordering Provider: Dr Yuri Palafox      Sleep Technician/Technologist: Ai KUMAR

## 2024-08-06 NOTE — PROCEDURES
"WatchPAT - HOME SLEEP STUDY INTERPRETATION    Patient: Garland Rice  MRN: [id number]  YOB: 1955  Study Date: 7/31/2024  Referring Provider: Jade Quinn  Ordering Provider: Yuri Palafox MD, MD    Chain of custody patient verification was not enabled.       Indications for Home Study: Garland Rice is a 69 year old male with a history of obesity, anxiety  who presents with symptoms suggestive of obstructive sleep apnea.    Estimated body mass index is 36.77 kg/m  as calculated from the following:    Height as of 7/16/24: 1.88 m (6' 2\").    Weight as of 7/16/24: 129.9 kg (286 lb 6.4 oz).  Total score - [place]: 13 (4/23/2024  2:30 PM)  Total Score: 7 (4/23/2024  2:28 PM)    Data: A full night home sleep study was performed recording the standard physiologic parameters including peripheral arterial tonometry (PAT), sound/snoring, body position,  movement, sound, and oxygen saturation by pulse oximetry. Pulse rate was estimated by oximetry recording. Sleep staging (wake, REM, light, and deep sleep) was derived from PAT signal.  This study was considered adequate based on > 4 hours of quality oximetry and respiratory recording. As specified by the AASM Manual for the Scoring of Sleep and Associated events, version 2.3, Rule VIII.D 1B, 4% oxygen desaturation scoring for hypopneas is used as a standard of care on all home sleep apnea testing.    Total Recording Time: 5 hrs, 54 min  Total Sleep Time: 5 hrs, 14 min  % of Sleep Time REM: 17.6%    Respiratory:  Snoring: Snoring was present.  Respiratory events: The PAT respiratory disturbance index [pRDI] was 45.6 events per hour.  The PAT apnea/hypopnea index [pAHI] was 44.6 events per hour.  SAMY was 33.3 events per hour.  During REM sleep the pAHI was 34.9.  Sleep Associated Hypoxemia: sustained hypoxemia was not present. Mean oxygen saturation was 91%.  Minimum was 86%.  Time with saturation less than 88% was 12.2 minutes.    Heart " Rate: By pulse oximetry normal rate was noted.     Position: Percent of time spent: supine - 1.8%, prone - 0%, on right - 64.5%, on left - 33.8%.  pAHI was - per hour supine, - per hour prone, 21.3 per hour on right side, and 85.6 per hour on left side.     Assessment:   Severe obstructive sleep apnea.  Sleep associated hypoxemia was present.    Recommendations:  Consider auto-CPAP at 5-15 cmH2O or polysomnography with full night PAP titration.  Suggest optimizing sleep hygiene and avoiding sleep deprivation.  Weight management.    Diagnosis Code(s): Obstructive Sleep Apnea G47.33, Hypoxemia G47.36    Yuri Palafox MD, MD, August 6, 2024   Diplomate, American Board of Family Medicine, Sleep Medicine

## 2024-08-19 ENCOUNTER — MYC REFILL (OUTPATIENT)
Dept: FAMILY MEDICINE | Facility: OTHER | Age: 69
End: 2024-08-19
Payer: COMMERCIAL

## 2024-08-19 DIAGNOSIS — M51.34 DDD (DEGENERATIVE DISC DISEASE), THORACIC: ICD-10-CM

## 2024-08-19 DIAGNOSIS — F11.90 CHRONIC, CONTINUOUS USE OF OPIOIDS: ICD-10-CM

## 2024-08-19 RX ORDER — HYDROCODONE BITARTRATE AND ACETAMINOPHEN 5; 325 MG/1; MG/1
1 TABLET ORAL 3 TIMES DAILY PRN
Qty: 90 TABLET | Refills: 0 | Status: CANCELLED | OUTPATIENT
Start: 2024-08-19

## 2024-08-19 NOTE — TELEPHONE ENCOUNTER
Patient updated on 8/15 order at [facility].  New order (not a refill).      Requested Prescriptions   Pending Prescriptions Disp Refills    HYDROcodone-acetaminophen (NORCO) 5-325 MG tablet 90 tablet 0     Sig: Take 1 tablet by mouth 3 times daily as needed for severe pain       There is no refill protocol information for this order          Last Prescription Date:   8/15/2024  Last Fill Qty/Refills:         90, R-0    Last Office Visit:              7/16/24   Future Office visit:           None    Next 5 appointments (look out 90 days)      Sep 09, 2024 8:00 AM  (Arrive by 7:45 AM)  Provider Visit with Yuri Palafox MD  [facility] ([facility] ) [address]  [place] [id number] [phone]            Rebeca Vaca RN on 8/19/2024 at 4:48 PM

## 2024-08-21 ENCOUNTER — MYC MEDICAL ADVICE (OUTPATIENT)
Dept: FAMILY MEDICINE | Facility: OTHER | Age: 69
End: 2024-08-21
Payer: COMMERCIAL

## 2024-08-21 DIAGNOSIS — G47.33 OSA (OBSTRUCTIVE SLEEP APNEA): Primary | ICD-10-CM

## 2024-08-27 ENCOUNTER — DOCUMENTATION ONLY (OUTPATIENT)
Dept: HOME HEALTH SERVICES | Facility: CLINIC | Age: 69
End: 2024-08-27
Payer: COMMERCIAL

## 2024-08-27 NOTE — PROGRESS NOTES
Patient was offered choice of vendor and chose [facility].  Patient Garland Rice was set up at Outside Vendor [facility] on August 27, 2024. Patient received a Resmed Airsense 11 Pressures were set at  5-15 cm H2O.   Patient s ramp is 5 cm H2O for Auto and FLEX/EPR is EPR, 2.  Patient received a Resmed Mask name: Airfit [id number]  Full Face mask size Large, heated tubing and heated humidifier.  Patient has the following compliance requirements: using and visit requirements  Patient has a follow up on 10/14/2024 with Dr. Palafox.    Anival Wynne

## 2024-09-13 ENCOUNTER — OFFICE VISIT (OUTPATIENT)
Dept: FAMILY MEDICINE | Facility: OTHER | Age: 69
End: 2024-09-13
Attending: NURSE PRACTITIONER
Payer: COMMERCIAL

## 2024-09-13 ENCOUNTER — MYC MEDICAL ADVICE (OUTPATIENT)
Dept: FAMILY MEDICINE | Facility: OTHER | Age: 69
End: 2024-09-13
Payer: COMMERCIAL

## 2024-09-13 VITALS
WEIGHT: 292 LBS | OXYGEN SATURATION: 95 % | BODY MASS INDEX: 37.49 KG/M2 | SYSTOLIC BLOOD PRESSURE: 132 MMHG | TEMPERATURE: 98.1 F | DIASTOLIC BLOOD PRESSURE: 80 MMHG | HEART RATE: 77 BPM | RESPIRATION RATE: 18 BRPM

## 2024-09-13 DIAGNOSIS — F41.9 ANXIETY: Primary | ICD-10-CM

## 2024-09-13 DIAGNOSIS — M51.34 DDD (DEGENERATIVE DISC DISEASE), THORACIC: ICD-10-CM

## 2024-09-13 DIAGNOSIS — M51.369 DDD (DEGENERATIVE DISC DISEASE), LUMBAR: ICD-10-CM

## 2024-09-13 DIAGNOSIS — G89.29 CHRONIC MIDLINE THORACIC BACK PAIN: ICD-10-CM

## 2024-09-13 DIAGNOSIS — M54.6 CHRONIC MIDLINE THORACIC BACK PAIN: ICD-10-CM

## 2024-09-13 DIAGNOSIS — Z98.890 H/O RADIOFREQUENCY ABLATION (RFA) OF NERVE OF LUMBAR SPINE: ICD-10-CM

## 2024-09-13 DIAGNOSIS — F11.90 CHRONIC, CONTINUOUS USE OF OPIOIDS: ICD-10-CM

## 2024-09-13 DIAGNOSIS — R07.81 RIB PAIN: ICD-10-CM

## 2024-09-13 PROCEDURE — 99214 OFFICE O/P EST MOD 30 MIN: CPT | Performed by: NURSE PRACTITIONER

## 2024-09-13 PROCEDURE — G0463 HOSPITAL OUTPT CLINIC VISIT: HCPCS

## 2024-09-13 PROCEDURE — G2211 COMPLEX E/M VISIT ADD ON: HCPCS | Performed by: NURSE PRACTITIONER

## 2024-09-13 RX ORDER — FLUOXETINE 10 MG/1
10 CAPSULE ORAL DAILY
Qty: 90 CAPSULE | Refills: 0 | Status: SHIPPED | OUTPATIENT
Start: 2024-09-13

## 2024-09-13 RX ORDER — HYDROCODONE BITARTRATE AND ACETAMINOPHEN 5; 325 MG/1; MG/1
1 TABLET ORAL 3 TIMES DAILY PRN
Qty: 90 TABLET | Refills: 0 | Status: SHIPPED | OUTPATIENT
Start: 2024-10-13

## 2024-09-13 RX ORDER — HYDROCODONE BITARTRATE AND ACETAMINOPHEN 5; 325 MG/1; MG/1
1 TABLET ORAL 3 TIMES DAILY PRN
Qty: 90 TABLET | Refills: 0 | Status: SHIPPED | OUTPATIENT
Start: 2024-09-13

## 2024-09-13 ASSESSMENT — ANXIETY QUESTIONNAIRES
GAD7 TOTAL SCORE: 7
7. FEELING AFRAID AS IF SOMETHING AWFUL MIGHT HAPPEN: NOT AT ALL
8. IF YOU CHECKED OFF ANY PROBLEMS, HOW DIFFICULT HAVE THESE MADE IT FOR YOU TO DO YOUR WORK, TAKE CARE OF THINGS AT HOME, OR GET ALONG WITH OTHER PEOPLE?: SOMEWHAT DIFFICULT
GAD7 TOTAL SCORE: 7
GAD7 TOTAL SCORE: 7

## 2024-09-13 ASSESSMENT — PAIN SCALES - GENERAL: PAINLEVEL: MILD PAIN (3)

## 2024-09-13 ASSESSMENT — ENCOUNTER SYMPTOMS: BACK PAIN: 1

## 2024-09-13 NOTE — NURSING NOTE
"Chief Complaint   Patient presents with    Back Pain     Chronic       Initial /80   Pulse 77   Temp 98.1  F (36.7  C) (Tympanic)   Resp 18   Wt 132.5 kg (292 lb)   SpO2 95%   BMI 37.49 kg/m   Estimated body mass index is 37.49 kg/m  as calculated from the following:    Height as of 7/16/24: 1.88 m (6' 2\").    Weight as of this encounter: 132.5 kg (292 lb).  Medication Reconciliation: complete        " Chief complaint:   Chief Complaint   Patient presents with   • Pacemaker     [employer] Single Chamber Leadless Pacemaker, Hx of AF, s/p AVJ ablation       Vitals:  Visit Vitals  /72   Ht 5' 1\" (1.549 m)   Wt 75.8 kg (167 lb)   BMI 31.55 kg/m²       HISTORY OF PRESENT ILLNESS     HPI      Abst for 2/21/23 OV  Bing Zee is here for 1 month follow up of Abbott leadless PPM implanted 12/06/22 following AVJ ablation in setting of permanent AF. On Metoprolol 25 mg qd and Xarelto 15 mg qd.  PMHx of AF since 2017, HTN, HLD, systolic and diastolic HF, BHAVNA (refuses CPAP), CKD stage III, multinodular goiter, s/p thyroidectomy, gastric bypass surgery, chronic anemia, renal cancer, s/p cryo ablation 11/1/2019,  Vaginal cancer (follows Dr. Dixon) cervical dysplasia s/p hysterectomy 2007 and osteoarthritis.  Last video visit 1/4/2023 SLO she reported extreme fatigue, low energy and SOB slightly worse since device reprogrammed at OV 12/20/2022. Device interrogation showed normal function.  Sensor gain reprogrammed from 2-4 (more aggressive)sensor rate 120 to 130 bpm.   Echo, CXR and VQ scan were ordered.  Plan f/up in 3-4 weeks.  1/6/2023 Echo EF 45%, IVS 1.8 cm, LVIW 1.3 cm, LA vol 55 ml/m2, moderate MR, TR, slightly worse from prior echo and L to R shunt atrial level. Due to transportation issues the pt scheduled the CXR and VQ scan later in February however she called the clinic on 1/10/23 because of  worsening symptoms and was directed to the ED.  EKG showed  93 bpm.  VQ can was neg. For PE.  CXR showed Mild bibasilar opacities are likely due to atelectasis versus less likely infiltrates. NT-proBNP was elevated 5666H ( similar to prior lab).  Discussed with EP who recommended the pt be started on Lasix 20 mg daily.   1/17/2023 OV Dr. Spears.  Pt reported orthopnea that resolved but continues to have SOB and fatigue on exertion and now requires a wheelchair when she goes out. Plan CPM, Lasix increased to 40 mg daily and  RACHID ordered for worsening MR/TR noted on recent echo.  2/7/23 televisit with Dr. Spears she denied any orthopnea and reported her breathing was slightly better with  good and bad days and occasional lightheadedness. Pt advised to CPM and schedule RACHID for evaluation of valves. (not scheduled).  Last NST 12/29/21 negative for ischemia, EF 50-55%.       1/11/23 K+ 4.8, BUN 26, Cr 1.30, GFR 41, H/H 11.9/35, SGOT/SGPT 29/15, NT proBNP 5,666        PAST MEDICAL, FAMILY AND SOCIAL HISTORY     Medications:  Current Outpatient Medications   Medication Sig Dispense Refill   • linaclotide (LINZESS) 145 MCG capsule Take 1 capsule by mouth as needed (constipation). 90 capsule 0   • spironolactone (ALDACTONE) 25 MG tablet Take 1 tablet by mouth daily. 90 tablet 0   • atorvastatin (LIPITOR) 20 MG tablet Take 1 tablet by mouth daily. 90 tablet 3   • furosemide (Lasix) 40 MG tablet Take 1 tablet by mouth daily. 90 tablet 3   • metoPROLOL succinate (TOPROL-XL) 25 MG 24 hr tablet Take 1 tablet by mouth nightly. 30 tablet 3   • trospium 60 MG extended-release capsule Take 1 capsule by mouth daily. 30 capsule 11   • Synthroid 125 MCG tablet daily.     • silver sulfADIAZINE (THERMAZENE) 1 % cream Apply 1 application topically 2 times daily. Apply to a thickness of 1/16 inch (\"nickel thick\"). To clean vulvar lesion 50 g 1   • rivaroxaban (Xarelto) 15 MG Tab Take 1 tablet by mouth nightly. 90 tablet 1   • lidocaine (XYLOCAINE) 5 % ointment Apply topically as needed for Pain. 240 g 11   • Ferrous Sulfate Dried 200 (65 Fe) MG Tab Take 200 mg by mouth daily. 100 tablet 0   • Cholecalciferol (vitamin D3) 25 mcg (1,000 units) capsule Take 25 mcg by mouth daily.     • brimonidine (ALPHAGAN) 0.2 % ophthalmic solution Place 1 drop into both eyes 2 times daily. 5 mL 12   • dorzolamide-timolol (COSOPT) 22.3-6.8 MG/ML ophthalmic solution Place 1 drop into both eyes 2 times daily. 10 mL 12     No current facility-administered medications for this  visit.       Allergies:  ALLERGIES:   Allergen Reactions   • Ciprofloxacin DIZZINESS   • Keflex PRURITUS   • Mirabegron DIZZINESS     Took 1 pill in May 2022 and caused severe dizziness   • Metronidazole NAUSEA     Nausea    • Oxycodone Other (See Comments)     itching         REVIEW OF SYSTEMS     Review of Systems   Constitutional: Positive for appetite change and fatigue.        Poor appetite, recent 5-6 lb weight loss.     Respiratory: Positive for shortness of breath. Negative for cough.    Cardiovascular: Positive for chest pain. Negative for palpitations and leg swelling.   Gastrointestinal: Positive for constipation. Negative for blood in stool, diarrhea, nausea and vomiting.   Neurological: Negative for dizziness and syncope.       PHYSICAL EXAM     Physical Exam       2023 Echo      2023 VQ scan  FINDINGS: Perfusion images demonstrate mildly heterogeneous tracer activity  within both lungs. No segmental V/Q mismatches suspicious for PE are  demonstrated.    2023 CXR  IMPRESSION:    1. Unchanged mild cardiomegaly.   2. Mild bibasilar opacities are likely due to atelectasis versus less  likely infiltrates.    IN PERSON DEVICE CHECK: 2022   [employer] Leadless Pacemaker DEVICE CKED BY MARYANA rowell.  The device was programmed to check thresholds, lead measurements and assess underlying rhythm.  The in person device check shows NORMAL FUNCTION.  Presenting Rhythm:   85 bpm   Underlying Rhythm: not assessed, hx of AVJ, CHB   Battery Voltage/Longevity:  2.4 years  Percent Pacin% , Histogram shows 90% at LR, about 10% between  bpm   Tachycardia Detections/Episodes:  NONE     Rate response reprogrammed from 2-3 for fatigue and SOB on exertion.  Simms walk done.  Pt had to stop after brief walk due  to worsening SOB and fatigue as well as dizziness.  BP on return to room 136/64. PM re interrogated and histogram shows -110 with short simms walk. BP after 5 minutes while sitting  102/58 which pt reports is usual BP for her.       Rate response reprogrammed less aggressive from 2 to 1.  Short simms walk.  Pt reported slightly less SOB and fatigue although stopped due to dizziness. /72 stand.  HR histogram again showed HRs between 100 and 110 bpm.           Programming Changes:  LR decreased from 80 to 70 bpm.  V output decreased from 5v to 3.5v to conserve battery life.  RR decreased from 2 to 1.  Will re-evaluate next visit.       See device parameters/results in the media      ASSESSMENT/PLAN     On 2/21/2023, I, RN scribed the services personally performed by Dr. Laura Jones.    No problem-specific Assessment & Plan notes found for this encounter.

## 2024-09-13 NOTE — PROGRESS NOTES
Assessment & Plan   Problem List Items Addressed This Visit          Musculoskeletal and Integumentary    DDD (degenerative disc disease), lumbar    Relevant Medications    HYDROcodone-acetaminophen (NORCO) 5-325 MG tablet    HYDROcodone-acetaminophen (NORCO) 5-325 MG tablet (Start on 10/13/2024)    Other Relevant Orders    Orthopedic  Referral     Other Visit Diagnoses       Anxiety    -  Primary    Relevant Medications    FLUoxetine (PROZAC) 10 MG capsule    DDD (degenerative disc disease), thoracic        Relevant Medications    HYDROcodone-acetaminophen (NORCO) 5-325 MG tablet    HYDROcodone-acetaminophen (NORCO) 5-325 MG tablet (Start on 10/13/2024)    Other Relevant Orders    Orthopedic  Referral    Chronic, continuous use of opioids        Relevant Medications    HYDROcodone-acetaminophen (NORCO) 5-325 MG tablet    HYDROcodone-acetaminophen (NORCO) 5-325 MG tablet (Start on 10/13/2024)    Other Relevant Orders    Orthopedic  Referral    Chronic midline thoracic back pain        Relevant Medications    HYDROcodone-acetaminophen (NORCO) 5-325 MG tablet    HYDROcodone-acetaminophen (NORCO) 5-325 MG tablet (Start on 10/13/2024)    Other Relevant Orders    Orthopedic  Referral    Rib pain        Relevant Medications    HYDROcodone-acetaminophen (NORCO) 5-325 MG tablet    HYDROcodone-acetaminophen (NORCO) 5-325 MG tablet (Start on 10/13/2024)    Other Relevant Orders    Orthopedic  Referral    H/O radiofrequency ablation (RFA) of nerve of lumbar spine        Relevant Orders    Orthopedic  Referral               Degenerative disc disease lumbar spine, degenerative disc disease of the thoracic spine, chronic midline thoracic and lumbar back pain.  History of radiofrequency ablation.  Referral to orthopedics at [facility] in [place], Dr. Be for second opinion.  [facility] reviewed and as expected.  Refilled hydrocodone.  Controlled substance agreement and  urine drug screen are up-to-date.  Long discussion regarding benzodiazepines, anxiety and treatment.  He is willing to try alternate treatment.  Will start him on fluoxetine 10 mg daily, low-dose to see how he tolerates.  Anticipate improvement in anxiety and reduced use of alprazolam.  He does hope to go up to [place] in January or February for work for approximately 10 months if back and anxiety is under better control.  Will plan to see him back in 1 to 2 months.    The longitudinal plan of care for the diagnosis(es)/condition(s) as documented were addressed during this visit. Due to the added complexity in care, I will continue to support Jorge L in the subsequent management and with ongoing continuity of care.      No follow-ups on file.      Subjective   Jorge L is a 69 year old, presenting for the following health issues:  Back Pain (Chronic)      9/13/2024     7:36 AM   Additional Questions   Roomed by DOUG Forde   Accompanied by Self         9/13/2024     7:36 AM   Patient Reported Additional Medications   Patient reports taking the following new medications N/A     History of Present Illness       Back Pain:  He presents for follow up of back pain. Patient's back pain is a chronic problem.  Location of back pain:  Right lower back, left lower back, right middle of back, left middle of back, right side of neck, right shoulder, right buttock, right hip and other  Description of back pain: burning, gnawing, sharp, shooting and stabbing  Back pain spreads: right thigh, right shoulder and right side of neck    Since patient first noticed back pain, pain is: always present, but gets better and worse  Does back pain interfere with his job:  Yes       Mental Health Follow-up:  Patient presents to follow-up on Anxiety.    Patient's anxiety since last visit has been:  No change  The patient is having other symptoms associated with anxiety.  Any significant life events: job concerns and health concerns  Patient is not  feeling anxious or having panic attacks.  Patient has no concerns about alcohol or drug use.    He eats 2-3 servings of fruits and vegetables daily.He consumes 1 sweetened beverage(s) daily.He exercises with enough effort to increase his heart rate 60 or more minutes per day.  He exercises with enough effort to increase his heart rate 7 days per week.   He is taking medications regularly.     He presents to clinic today to discuss multiple concerns.  He did have lumbar radiofrequency ablation completed in June 2024, reports pain has improved but right leg continues to be numb.  He did not show significant improvement in his symptoms.  He continues to have midthoracic pain, lumbar pain and pain that wraps around to his ribs, pain is constant.  He is looking for second opinion for further evaluation and management.  He continues to use  hydrocodone 3 times daily.  He is not using anything over-the-counter for symptomatic management.  We also discussed his anxiety, use of use of alprazolam twice daily especially in addition to the hydrocortisone.  At his last visit we discussed this in detail.  He does have chronic anxiety, interested in looking at other options.  He does report history of being on antidepressants in the past, these made increased dreams and nightmares, mind racing and body trembling.  Reports being on sertraline in 1996, was good for 2 to 3 years, when this was discontinued and started again a later date he did not tolerate it.  He is also been on Effexor and BuSpar.  He is open to suggestions.          Objective    /80   Pulse 77   Temp 98.1  F (36.7  C) (Tympanic)   Resp 18   Wt 132.5 kg (292 lb)   SpO2 95%   BMI 37.49 kg/m    Body mass index is 37.49 kg/m .  Physical Exam   GENERAL: alert and no distress  EYES: Eyes grossly normal to inspection  RESP: lungs clear to auscultation - no rales, rhonchi or wheezes  CV: regular rate and rhythm, normal S1 S2  MS: Thoracic and lumbar spine  tenderness with palpation, paraspinal musculature tender as well.  SKIN: no suspicious lesions or rashes  NEURO: Normal strength and tone, mentation intact and speech normal  PSYCH: mentation appears normal, affect normal/bright            Signed Electronically by: ERROL Mcknight CNP

## 2024-10-10 NOTE — PROGRESS NOTES
"Garland Rice is a 69 year old male who is being evaluated via in-person clinic visit.       Visit Details     In-clinic visit for CPAP compliance follow-up.     A/P:     1.)  Severe LEON (pAHI 44.6) with mild sleep-associated hypoxemia (SpO2 <= 88% for 12.2 minutes)    Appears well controlled with adequate compliance with CPAP auto-titrate 5-15 cm H2O.    Plan to continue CPAP on current settings.    Plan for routine follow-up in 1 year or sooner if questions or concerns.    The longitudinal plan of care for the diagnosis(es)/condition(s) as documented were addressed during this visit. Due to the added complexity in care, I will continue to support Jorge L in the subsequent management and with ongoing continuity of care.      SUBJECTIVE:  Garland Rice is a 69 year old male.    Pertinent PMHx of obesity, anxiety.     7/8/2024 -he presents today under urging from his spouse to be evaluated for sleep apnea he does not identify any concerns, but reports that he has been told that he snores and frequently stops breathing while sleeping.  But, he does acknowledge that he has been more tired during the day, though he does associate this with his worsening back pain that led him to stop working in December and the interim 30 pound weight gain.     He has historically slept in a somewhat bimodal pattern.  When working he would often initiate sleep between 7-8 PM and then awaken between 2-3 AM.  Now that he has not been working, bedtime is closer to 9 PM with similar awakening around 2-3 AM and then he will then have a longer nap in the early afternoon.     Chief concern per questionnaire: \"Broken up sleep pattern. Sleeping 4-5 hours at night and then a nap mid morning. Not feeling well rested. My wife says I stop breathing at times archie snore \"     Duration of symptoms:  \"Sept. 2024 \"     Goals for visit per questionnaire: \"To get back to restful sleep. \"     Sleep pattern:  Workdays.  9pm to 2-3am.  Weekends.  " "9pm to 2-3am.  Time to fall asleep: ~few minutes.  Awakenings: 5-10 times per night, ? minutes to return to sleep.  Average total sleep time:  ? hours  Napping.  7 days per week, ? hours per nap.     Yes for RLS screen.  No for sleep walking.  No for dream enactment behavior.  No for bruxism.     No for morning headaches.  Yes for snoring.  Yes for observed apnea.  No for FHx of LEON.     Caffeine use:  ? for 3+ per day.  No for within 6 hours of bed.     SHx:  \"I haven't been able to work the last 5 months due to back issues. Imbeing treated for it. \"    A/P for Watch[employer] HST.    Today - He feels the CPAP is working as expected, no concerns or questions today.  He feels he is sleeping longer without disruption, less tired.    CPAP download reviewed over past 30 days on auto-titrate 5-15 cm H2O.  Used 30/30 days, average usage 342 minutes.  AHI 0.9.    WatchPAT - HOME SLEEP STUDY INTERPRETATION     Patient: Garland Rice  MRN: [id number]  YOB: 1955  Study Date: 7/31/2024  Referring Provider: Jade Quinn  Ordering Provider: Yuri Palafox MD, MD     Chain of custody patient verification was not enabled.       Indications for Home Study: Garland Rice is a 69 year old male with a history of obesity, anxiety  who presents with symptoms suggestive of obstructive sleep apnea.     Estimated body mass index is 36.77 kg/m  as calculated from the following:    Height as of 7/16/24: 1.88 m (6' 2\").    Weight as of 7/16/24: 129.9 kg (286 lb 6.4 oz).  Total score - [place]: 13 (4/23/2024  2:30 PM)  Total Score: 7 (4/23/2024  2:28 PM)     Data: A full night home sleep study was performed recording the standard physiologic parameters including peripheral arterial tonometry (PAT), sound/snoring, body position,  movement, sound, and oxygen saturation by pulse oximetry. Pulse rate was estimated by oximetry recording. Sleep staging (wake, REM, light, and deep sleep) was derived from PAT " signal.  This study was considered adequate based on > 4 hours of quality oximetry and respiratory recording. As specified by the AASM Manual for the Scoring of Sleep and Associated events, version 2.3, Rule VIII.D 1B, 4% oxygen desaturation scoring for hypopneas is used as a standard of care on all home sleep apnea testing.     Total Recording Time: 5 hrs, 54 min  Total Sleep Time: 5 hrs, 14 min  % of Sleep Time REM: 17.6%     Respiratory:  Snoring: Snoring was present.  Respiratory events: The PAT respiratory disturbance index [pRDI] was 45.6 events per hour.  The PAT apnea/hypopnea index [pAHI] was 44.6 events per hour.  SAMY was 33.3 events per hour.  During REM sleep the pAHI was 34.9.  Sleep Associated Hypoxemia: sustained hypoxemia was not present. Mean oxygen saturation was 91%.  Minimum was 86%.  Time with saturation less than 88% was 12.2 minutes.     Heart Rate: By pulse oximetry normal rate was noted.      Position: Percent of time spent: supine - 1.8%, prone - 0%, on right - 64.5%, on left - 33.8%.  pAHI was - per hour supine, - per hour prone, 21.3 per hour on right side, and 85.6 per hour on left side.      Assessment:   Severe obstructive sleep apnea.  Sleep associated hypoxemia was present.     Recommendations:  Consider auto-CPAP at 5-15 cmH2O or polysomnography with full night PAP titration.  Suggest optimizing sleep hygiene and avoiding sleep deprivation.  Weight management.     Diagnosis Code(s): Obstructive Sleep Apnea G47.33, Hypoxemia G47.36     Yuri Palafox MD, MD, August 6, 2024   Diplomate, American Board of Family Medicine, Sleep Medicine    Past medical history:    Patient Active Problem List    Diagnosis Date Noted    Class 2 severe obesity due to excess calories with serious comorbidity in adult (H) 04/17/2024     Priority: Medium    Chronic bilateral low back pain without sciatica 12/05/2023     Priority: Medium    Rib pain on right side 12/05/2023     Priority: Medium    DDD  (degenerative disc disease), lumbar 12/05/2023     Priority: Medium    Restless leg syndrome 06/06/2023     Priority: Medium    Hyperglycemia 01/19/2023     Priority: Medium    History of vasculitis of skin 01/18/2022     Priority: Medium    Peripheral polyneuropathy 01/18/2022     Priority: Medium    History of smoking 12/03/2020     Priority: Medium    Pulmonary nodules 12/03/2020     Priority: Medium    Other specified anxiety disorders 01/17/2018     Priority: Medium    Hyperlipidemia 01/17/2018     Priority: Medium    Irritable bowel syndrome 01/17/2018     Priority: Medium    H/O adenomatous polyp of colon 03/02/2016     Priority: Medium    Diverticulosis of sigmoid colon 01/11/2010     Priority: Medium       10 point ROS of systems including Constitutional, Eyes, Respiratory, Cardiovascular, Gastroenterology, Genitourinary, Integumentary, Muscularskeletal, Psychiatric were all negative except for pertinent positives noted in my HPI.    Current Outpatient Medications   Medication Sig Dispense Refill    ALPRAZolam (XANAX) 0.5 MG tablet Take 1 tablet (0.5 mg) by mouth 2 times daily as needed for anxiety 60 tablet 5    FLUoxetine (PROZAC) 10 MG capsule Take 1 capsule (10 mg) by mouth daily. 90 capsule 0    HYDROcodone-acetaminophen (NORCO) 5-325 MG tablet Take 1 tablet by mouth 3 times daily as needed for severe pain. 90 tablet 0    [START ON 10/13/2024] HYDROcodone-acetaminophen (NORCO) 5-325 MG tablet Take 1 tablet by mouth 3 times daily as needed for severe pain. 90 tablet 0    Multiple Vitamin (MULTI-VITAMINS) TABS Take 1 tablet by mouth daily      naloxone (NARCAN) 4 MG/0.1ML nasal spray Spray 1 spray (4 mg) into one nostril alternating nostrils once as needed for opioid reversal every 2-3 minutes until assistance arrives 0.2 mL 0    rosuvastatin (CRESTOR) 5 MG tablet Take 1 tablet (5 mg) by mouth daily 90 tablet 0    triamcinolone (KENALOG) 0.1 % external ointment Apply topically 2 times daily 30 g 3     vitamin C (ASCORBIC ACID) 500 MG tablet Take 1 tablet (500 mg) by mouth daily         OBJECTIVE:  There were no vitals taken for this visit.    Physical Exam     ---  This note was written with the assistance of the Dragon voice-dictation technology software. The final document, although reviewed, may contain errors. For corrections, please contact the office.    Total time spent preparing to see the patient, review of chart, obtaining history and physical examination, review of sleep testing, review of treatment options, education, discussion with patient and documenting in Epic / EMR was 15 minutes.  All time involved was spent on the day of service for the patient (the same day as the patient's appointment).    Yuri Palafox MD    Sleep Medicine  [place], [place]  Main Office: [phone]  [place]  [address] [place] [address], [address]  Schedule visits: [phone]  Main Office: [phone]  Fax: [phone]

## 2024-10-11 NOTE — PROGRESS NOTES
CPAP Compliance Visit:       Name: Garland Rice MRN# [id number]   Age: 69 year old YOB: 1955     Date of Consultation: October 11, 2024  Primary care provider: Jade Quinn    Compliance:   100 % of days with >4 hours of use.   0days/30 with no use   Average use: 5hours 47minutes per day   95%ile leak 32.8 L/min   CPAP 95% pressure 10.7       AHI  events/hour   SANDRA 0  PB 0%     Impression:   Patient is {CPAP Compliance :279010}

## 2024-10-14 ENCOUNTER — OFFICE VISIT (OUTPATIENT)
Dept: FAMILY MEDICINE | Facility: OTHER | Age: 69
End: 2024-10-14
Attending: FAMILY MEDICINE
Payer: COMMERCIAL

## 2024-10-14 DIAGNOSIS — G47.33 OSA (OBSTRUCTIVE SLEEP APNEA): Primary | ICD-10-CM

## 2024-10-14 PROCEDURE — G2211 COMPLEX E/M VISIT ADD ON: HCPCS | Performed by: FAMILY MEDICINE

## 2024-10-14 PROCEDURE — G0463 HOSPITAL OUTPT CLINIC VISIT: HCPCS

## 2024-10-14 PROCEDURE — 99213 OFFICE O/P EST LOW 20 MIN: CPT | Performed by: FAMILY MEDICINE

## 2024-10-14 NOTE — PROGRESS NOTES
Sleep Medicine Clinic Rooming Note    Patient was identified appropriately by name and date of birth.    Medication Reconciliation: complete    Chief complaint: CPAP follow up       Height: 74 ft/inches  Weight: 285 lbs  SpO2: 93  HR: 83  BP: 143/84  Neck circumference: 19 inches     [place] Sleepiness Scale    How likely are you to doze off or fall asleep in the following situations, in contrast to just feeling tired?    This refers to your usual way of life recently.    Even if you haven't done some of these things recently, try to figure out how they would have affected you.    Use the following scale to choose the most appropriate number for each situation:  0 = NO CHANCE of dozing  1 = SLIGHT CHANCE of dozing  2 = MODERATE CHANCE of dozing  3 = HIGH CHANCE of dozing    Sitting and Reading: 3  Watching television: 3  Sitting inactive in a public place:0  Riding in car:1  Laying down to rest in the afternoon:3  Sitting and talking to someone:0  Sitting quietly after lunch without alcohol:2  In a car, stopped in traffic for a few minutes:0    Score: 12    DME needs: ***    Additional Notes: ***

## 2024-10-24 DIAGNOSIS — E78.5 HYPERLIPIDEMIA, UNSPECIFIED HYPERLIPIDEMIA TYPE: ICD-10-CM

## 2024-10-29 RX ORDER — ROSUVASTATIN CALCIUM 5 MG/1
5 TABLET, COATED ORAL DAILY
Qty: 90 TABLET | Refills: 0 | Status: SHIPPED | OUTPATIENT
Start: 2024-10-29

## 2024-10-29 NOTE — TELEPHONE ENCOUNTER
[id number] [place] sent Rx request for the following:      Requested Prescriptions   Pending Prescriptions Disp Refills    rosuvastatin (CRESTOR) 5 MG tablet [Pharmacy Med Name: Rosuvastatin Calcium 5 MG Oral Tablet] 90 tablet 0     Sig: Take 1 tablet by mouth once daily   Last Prescription Date:   4/17/24  Last Fill Qty/Refills:         90, R-0    Last Office Visit:                9/13/24 5/24/24 (Preop)    Future Office visit:             Future Appointments 10/29/2024 - 4/27/2025        Date Visit Type Length Department Provider     11/14/2024  8:00 AM OFFICE VISIT 20 min  FAMILY PRACTICE Jade Quinn APRN CNP    Location Instructions:     [facility] is located [address]                   Unable to complete prescription refill per RN Medication Refill Policy. Tamar Stephenson RN .............. 10/29/2024  11:42 AM

## 2024-11-13 ASSESSMENT — ANXIETY QUESTIONNAIRES
4. TROUBLE RELAXING: SEVERAL DAYS
GAD7 TOTAL SCORE: 4
6. BECOMING EASILY ANNOYED OR IRRITABLE: NOT AT ALL
8. IF YOU CHECKED OFF ANY PROBLEMS, HOW DIFFICULT HAVE THESE MADE IT FOR YOU TO DO YOUR WORK, TAKE CARE OF THINGS AT HOME, OR GET ALONG WITH OTHER PEOPLE?: SOMEWHAT DIFFICULT
1. FEELING NERVOUS, ANXIOUS, OR ON EDGE: SEVERAL DAYS
7. FEELING AFRAID AS IF SOMETHING AWFUL MIGHT HAPPEN: NOT AT ALL
3. WORRYING TOO MUCH ABOUT DIFFERENT THINGS: SEVERAL DAYS
IF YOU CHECKED OFF ANY PROBLEMS ON THIS QUESTIONNAIRE, HOW DIFFICULT HAVE THESE PROBLEMS MADE IT FOR YOU TO DO YOUR WORK, TAKE CARE OF THINGS AT HOME, OR GET ALONG WITH OTHER PEOPLE: SOMEWHAT DIFFICULT
5. BEING SO RESTLESS THAT IT IS HARD TO SIT STILL: SEVERAL DAYS
7. FEELING AFRAID AS IF SOMETHING AWFUL MIGHT HAPPEN: NOT AT ALL
2. NOT BEING ABLE TO STOP OR CONTROL WORRYING: NOT AT ALL

## 2024-11-14 ENCOUNTER — TELEPHONE (OUTPATIENT)
Dept: FAMILY MEDICINE | Facility: OTHER | Age: 69
End: 2024-11-14
Payer: COMMERCIAL

## 2024-11-14 ENCOUNTER — OFFICE VISIT (OUTPATIENT)
Dept: FAMILY MEDICINE | Facility: OTHER | Age: 69
End: 2024-11-14
Attending: NURSE PRACTITIONER
Payer: COMMERCIAL

## 2024-11-14 VITALS
SYSTOLIC BLOOD PRESSURE: 138 MMHG | TEMPERATURE: 97.4 F | HEART RATE: 80 BPM | RESPIRATION RATE: 18 BRPM | BODY MASS INDEX: 38.83 KG/M2 | OXYGEN SATURATION: 93 % | DIASTOLIC BLOOD PRESSURE: 80 MMHG | WEIGHT: 302.4 LBS

## 2024-11-14 DIAGNOSIS — M51.34 DDD (DEGENERATIVE DISC DISEASE), THORACIC: ICD-10-CM

## 2024-11-14 DIAGNOSIS — F41.9 ANXIETY: ICD-10-CM

## 2024-11-14 DIAGNOSIS — F11.90 CHRONIC, CONTINUOUS USE OF OPIOIDS: ICD-10-CM

## 2024-11-14 DIAGNOSIS — G62.9 PERIPHERAL POLYNEUROPATHY: Primary | ICD-10-CM

## 2024-11-14 PROCEDURE — G0463 HOSPITAL OUTPT CLINIC VISIT: HCPCS

## 2024-11-14 RX ORDER — HYDROCODONE BITARTRATE AND ACETAMINOPHEN 5; 325 MG/1; MG/1
1 TABLET ORAL 3 TIMES DAILY PRN
Qty: 90 TABLET | Refills: 0 | Status: SHIPPED | OUTPATIENT
Start: 2024-11-14 | End: 2024-11-14

## 2024-11-14 RX ORDER — GABAPENTIN 100 MG/1
CAPSULE ORAL
Qty: 120 CAPSULE | Refills: 1 | Status: SHIPPED | OUTPATIENT
Start: 2024-11-14

## 2024-11-14 RX ORDER — HYDROCODONE BITARTRATE AND ACETAMINOPHEN 5; 325 MG/1; MG/1
1 TABLET ORAL 3 TIMES DAILY PRN
Qty: 90 TABLET | Refills: 0 | Status: SHIPPED | OUTPATIENT
Start: 2024-11-14

## 2024-11-14 RX ORDER — HYDROCODONE BITARTRATE AND ACETAMINOPHEN 5; 325 MG/1; MG/1
1 TABLET ORAL 3 TIMES DAILY PRN
Qty: 90 TABLET | Refills: 0 | Status: SHIPPED | OUTPATIENT
Start: 2024-12-12

## 2024-11-14 ASSESSMENT — PAIN SCALES - GENERAL: PAINLEVEL_OUTOF10: MILD PAIN (3)

## 2024-11-14 NOTE — TELEPHONE ENCOUNTER
Pharmacy calling in with questions regarding 2 prescriptions for Norco for patient that were received. Please call [phone] to advise.  Laila Herrera on 11/14/2024 at 9:16 AM

## 2024-11-14 NOTE — TELEPHONE ENCOUNTER
Called and verified patient full name and  with Miranda at Pharmacy. States that they recevied two orders for Hydro with fill date of today. Should there be one order or two orders with different dates?     Monica Sales LPN on 2024 at 9:26 AM

## 2024-11-14 NOTE — NURSING NOTE
Chief Complaint   Patient presents with    RECHECK     Anxiety and back pain          Medication Reconciliation: complete    Monica Sales, LPN

## 2024-11-14 NOTE — PROGRESS NOTES
Assessment & Plan   Problem List Items Addressed This Visit          Nervous and Auditory    Peripheral polyneuropathy - Primary    Relevant Medications    gabapentin (NEURONTIN) 100 MG capsule     Other Visit Diagnoses       DDD (degenerative disc disease), thoracic        Relevant Medications    HYDROcodone-acetaminophen (NORCO) 5-325 MG tablet    gabapentin (NEURONTIN) 100 MG capsule    Chronic, continuous use of opioids        Relevant Medications    HYDROcodone-acetaminophen (NORCO) 5-325 MG tablet    gabapentin (NEURONTIN) 100 MG capsule    Anxiety        Relevant Medications    FLUoxetine (PROZAC) 20 MG capsule    gabapentin (NEURONTIN) 100 MG capsule           He does have chronic back pain, peripheral neuropathy, will do a trial of gabapentin 100 mg  twice daily for a few days and then may increase to 2 capsules twice daily.  He is aware that we can adjust this dose further if needed, I can do this through [facility] over the next couple months.  [facility] reviewed and as expected.  Refilled hydrocodone x 2 months.  Urine drug screen and controlled substance agreement are up-to-date.  Increase fluoxetine to 20 mg daily.  He will continue with alprazolam, he is aware not to take this with the hydrocodone.  The goal is to try to reduce the alprazolam and pain medications over time.  He will follow-up after spine specialist appointment.    The longitudinal plan of care for the diagnosis(es)/condition(s) as documented were addressed during this visit. Due to the added complexity in care, I will continue to support Jorge L in the subsequent management and with ongoing continuity of care.       No follow-ups on file.      Nichole Coats is a 69 year old, presenting for the following health issues:  RECHECK (Anxiety and back pain )      11/14/2024     7:49 AM   Additional Questions   Roomed by Monica Sales     History of Present Illness       Back Pain:  He presents for follow up of back pain. Patient's back  pain is a chronic problem.  Location of back pain:  Right lower back, left lower back, right middle of back, left middle of back, right side of neck, right shoulder, right buttock, right hip and right side of waist  Description of back pain: burning, cramping, dull ache, fullness, gnawing, sharp, shooting and stabbing  Back pain spreads: right thigh, right shoulder and right side of neck    Since patient first noticed back pain, pain is: gradually worsening  Does back pain interfere with his job:  Yes       Mental Health Follow-up:  Patient presents to follow-up on Anxiety.    Patient's anxiety since last visit has been:  Better  The patient is not having other symptoms associated with anxiety.  Any significant life events: health concerns  Patient is feeling anxious or having panic attacks.  Patient has no concerns about alcohol or drug use.    Reason for visit:  To discuss my ongoing treatment for back pain, leg numbness, sporadic rib pain, sporadic shocks, anxiety and a new spasm (pain) in the area of my bladder. medication management and to inform of my upcoming appointment for a second opinion on my back.    He eats 2-3 servings of fruits and vegetables daily.He consumes 0 sweetened beverage(s) daily.He exercises with enough effort to increase his heart rate 30 to 60 minutes per day.  He exercises with enough effort to increase his heart rate 7 days per week.   He is taking medications regularly.     He presents to clinic today for follow-up on anxiety as well as back pain.  He does not feel his back pain has changed but does feel somewhat worse/different.  He is going to be seeing specialist in [place] for second opinion December 20.  Continues to take hydrocodone for pain management in the meantime.  He is tolerating fluoxetine well, no side effects, does feel take the edge off of his OCD symptoms.  He does have questions about other medications such as gabapentin.      Objective    /80   Pulse 80    Temp 97.4  F (36.3  C) (Temporal)   Resp 18   Wt 137.2 kg (302 lb 6.4 oz)   SpO2 93%   BMI 38.83 kg/m    Body mass index is 38.83 kg/m .  Physical Exam   GENERAL: alert and no distress  EYES: Eyes grossly normal to inspection  RESP: lungs clear to auscultation - no rales, rhonchi or wheezes  CV: regular rate and rhythm, normal S1 S2  NEURO: Normal strength and tone, mentation intact and speech normal  PSYCH: mentation appears normal, affect normal/bright            Signed Electronically by: ERROL Mcknight CNP

## 2024-11-14 NOTE — TELEPHONE ENCOUNTER
Different dates. I resent one for 28 days from now. Jade Quinn, ERROL CNP on 11/14/2024 at 9:33 AM

## 2025-01-02 ENCOUNTER — OFFICE VISIT (OUTPATIENT)
Dept: FAMILY MEDICINE | Facility: OTHER | Age: 70
End: 2025-01-02
Attending: NURSE PRACTITIONER
Payer: COMMERCIAL

## 2025-01-02 VITALS
OXYGEN SATURATION: 95 % | TEMPERATURE: 98.2 F | BODY MASS INDEX: 40.63 KG/M2 | HEIGHT: 72 IN | RESPIRATION RATE: 20 BRPM | HEART RATE: 80 BPM | DIASTOLIC BLOOD PRESSURE: 82 MMHG | SYSTOLIC BLOOD PRESSURE: 130 MMHG | WEIGHT: 300 LBS

## 2025-01-02 DIAGNOSIS — E66.01 CLASS 3 SEVERE OBESITY DUE TO EXCESS CALORIES WITH SERIOUS COMORBIDITY AND BODY MASS INDEX (BMI) OF 40.0 TO 44.9 IN ADULT (H): ICD-10-CM

## 2025-01-02 DIAGNOSIS — E66.813 CLASS 3 SEVERE OBESITY DUE TO EXCESS CALORIES WITH SERIOUS COMORBIDITY AND BODY MASS INDEX (BMI) OF 40.0 TO 44.9 IN ADULT (H): ICD-10-CM

## 2025-01-02 DIAGNOSIS — G62.9 PERIPHERAL POLYNEUROPATHY: Primary | ICD-10-CM

## 2025-01-02 DIAGNOSIS — F11.90 CHRONIC, CONTINUOUS USE OF OPIOIDS: ICD-10-CM

## 2025-01-02 DIAGNOSIS — M51.34 DDD (DEGENERATIVE DISC DISEASE), THORACIC: ICD-10-CM

## 2025-01-02 DIAGNOSIS — F41.8 OTHER SPECIFIED ANXIETY DISORDERS: ICD-10-CM

## 2025-01-02 PROCEDURE — G0463 HOSPITAL OUTPT CLINIC VISIT: HCPCS

## 2025-01-02 RX ORDER — HYDROCODONE BITARTRATE AND ACETAMINOPHEN 5; 325 MG/1; MG/1
1 TABLET ORAL 3 TIMES DAILY PRN
Qty: 90 TABLET | Refills: 0 | Status: SHIPPED | OUTPATIENT
Start: 2025-01-02

## 2025-01-02 RX ORDER — HYDROCODONE BITARTRATE AND ACETAMINOPHEN 5; 325 MG/1; MG/1
1 TABLET ORAL 3 TIMES DAILY PRN
Qty: 90 TABLET | Refills: 0 | Status: SHIPPED | OUTPATIENT
Start: 2025-02-01

## 2025-01-02 RX ORDER — ALPRAZOLAM 0.5 MG
0.5 TABLET ORAL 2 TIMES DAILY PRN
Qty: 60 TABLET | Refills: 5 | Status: SHIPPED | OUTPATIENT
Start: 2025-01-02

## 2025-01-02 RX ORDER — MELOXICAM 7.5 MG/1
7.5 TABLET ORAL DAILY
Qty: 90 TABLET | Refills: 4 | Status: SHIPPED | OUTPATIENT
Start: 2025-01-02

## 2025-01-02 RX ORDER — GABAPENTIN 300 MG/1
300 CAPSULE ORAL 2 TIMES DAILY
Qty: 60 CAPSULE | Refills: 11 | Status: SHIPPED | OUTPATIENT
Start: 2025-01-02

## 2025-01-02 ASSESSMENT — ANXIETY QUESTIONNAIRES
2. NOT BEING ABLE TO STOP OR CONTROL WORRYING: SEVERAL DAYS
GAD7 TOTAL SCORE: 5
IF YOU CHECKED OFF ANY PROBLEMS ON THIS QUESTIONNAIRE, HOW DIFFICULT HAVE THESE PROBLEMS MADE IT FOR YOU TO DO YOUR WORK, TAKE CARE OF THINGS AT HOME, OR GET ALONG WITH OTHER PEOPLE: SOMEWHAT DIFFICULT
5. BEING SO RESTLESS THAT IT IS HARD TO SIT STILL: NOT AT ALL
4. TROUBLE RELAXING: SEVERAL DAYS
8. IF YOU CHECKED OFF ANY PROBLEMS, HOW DIFFICULT HAVE THESE MADE IT FOR YOU TO DO YOUR WORK, TAKE CARE OF THINGS AT HOME, OR GET ALONG WITH OTHER PEOPLE?: SOMEWHAT DIFFICULT
GAD7 TOTAL SCORE: 5
1. FEELING NERVOUS, ANXIOUS, OR ON EDGE: MORE THAN HALF THE DAYS
6. BECOMING EASILY ANNOYED OR IRRITABLE: NOT AT ALL
3. WORRYING TOO MUCH ABOUT DIFFERENT THINGS: SEVERAL DAYS
7. FEELING AFRAID AS IF SOMETHING AWFUL MIGHT HAPPEN: NOT AT ALL

## 2025-01-02 NOTE — PROGRESS NOTES
Assessment & Plan   Problem List Items Addressed This Visit          Nervous and Auditory    Peripheral polyneuropathy - Primary    Relevant Medications    gabapentin (NEURONTIN) 300 MG capsule    meloxicam (MOBIC) 7.5 MG tablet       Digestive    Class 3 severe obesity due to excess calories with serious comorbidity and body mass index (BMI) of 40.0 to 44.9 in adult (H)       Behavioral    Other specified anxiety disorders    Relevant Medications    gabapentin (NEURONTIN) 300 MG capsule    ALPRAZolam (XANAX) 0.5 MG tablet     Other Visit Diagnoses       DDD (degenerative disc disease), thoracic        Relevant Medications    HYDROcodone-acetaminophen (NORCO) 5-325 MG tablet (Start on 2/1/2025)    HYDROcodone-acetaminophen (NORCO) 5-325 MG tablet    meloxicam (MOBIC) 7.5 MG tablet    Chronic, continuous use of opioids        Relevant Medications    HYDROcodone-acetaminophen (NORCO) 5-325 MG tablet (Start on 2/1/2025)    HYDROcodone-acetaminophen (NORCO) 5-325 MG tablet    meloxicam (MOBIC) 7.5 MG tablet           [place]  reviewed and as expected.  Urine drug screen and controlled substance agreement are up-to-date.  Refilled hydrocodone with 2 separate 30-day prescriptions.  He will continue working with spine specialist for long-term plan.  We did discuss what is time to reduce medications how we would work to taper.  Add meloxicam 7.5 mg daily to help with pain management.  Also increase gabapentin to 300 mg twice daily.  He continues to be aware to not take Xanax along with hydrocodone due to respiratory risk.  He has not had any aberrant behavior with medications at this time.    He has had limitations with physical activity resulting in weight gain due to pain that is being dealt with as noted above.  Encouraged him to work on activity and dietary changes as able.    The longitudinal plan of care for the diagnosis(es)/condition(s) as documented were addressed during this visit. Due to the added  complexity in care, I will continue to support Jorge L in the subsequent management and with ongoing continuity of care.       No follow-ups on file.      Subjective   Jorge L is a 69 year old, presenting for the following health issues:  Medication Therapy Management    History of Present Illness       Back Pain:  He presents for follow up of back pain. Patient's back pain is a chronic problem.  Location of back pain:  Right lower back, left lower back, right middle of back, left middle of back, right side of neck, right shoulder, right buttock, right hip and other  Description of back pain: burning, cramping, dull ache, fullness, gnawing, sharp, shooting and stabbing  Back pain spreads: right buttocks, right thigh, right knee, right foot, right shoulder and right side of neck    Since patient first noticed back pain, pain is: gradually worsening  Does back pain interfere with his job:  Yes       Mental Health Follow-up:  Patient presents to follow-up on Anxiety.    Patient's anxiety since last visit has been:  Worse  The patient is having other symptoms associated with anxiety.  Any significant life events: job concerns, grief or loss and health concerns  Patient is feeling anxious or having panic attacks.  Patient has no concerns about alcohol or drug use.    He eats 2-3 servings of fruits and vegetables daily.He consumes 1 sweetened beverage(s) daily.He exercises with enough effort to increase his heart rate 30 to 60 minutes per day.  He exercises with enough effort to increase his heart rate 7 days per week.   He is taking medications regularly.     He presents to clinic today for follow-up.  He has been working with spine specialist down at [facility] in [place].  He has an MRI scheduled in a couple weeks, pending results will also have an EMG.  Spine specialist with concern about stenosis, possible spine surgery in the future.  He has noted increased pain in his toes especially when sitting.  He has  peripheral neuropathy, finding the pain in the toes worsening lately.  Has taken more pain medications a few times and normal.  He also is having a lot of anxiety over the past 2 weeks.  Besides dealing with his pain and the holidays, his father has been  in the hospital, passed away yesterday.  Sister was living with him, now homeless and he is dealing with the probate case with his uncle.  Continues to be on medications for depression and anxiety, taking benzodiazepines.        Objective    /82   Pulse 80   Temp 98.2  F (36.8  C)   Resp 20   Ht 1.829 m (6')   Wt 136.1 kg (300 lb)   SpO2 95%   BMI 40.69 kg/m    Body mass index is 40.69 kg/m .  Physical Exam   GENERAL: alert and no distress  EYES: Eyes grossly normal to inspection  MS: no gross musculoskeletal defects noted, normal CMS to right foot/toes  SKIN: no suspicious lesions or rashes  NEURO: Normal strength and tone, mentation intact and speech normal  PSYCH: mentation appears normal, affect normal/bright            Signed Electronically by: ERROL Mcknight CNP

## 2025-01-02 NOTE — NURSING NOTE
Patient presents today for follow up anxiety and depression.    Medication Reconciliation Complete    Sandra Cortes LPN  1/2/2025 9:05 AM

## 2025-01-17 ENCOUNTER — MYC MEDICAL ADVICE (OUTPATIENT)
Dept: FAMILY MEDICINE | Facility: OTHER | Age: 70
End: 2025-01-17
Payer: COMMERCIAL

## 2025-01-17 DIAGNOSIS — Z98.890 H/O RADIOFREQUENCY ABLATION (RFA) OF NERVE OF LUMBAR SPINE: ICD-10-CM

## 2025-01-17 DIAGNOSIS — M54.6 CHRONIC MIDLINE THORACIC BACK PAIN: ICD-10-CM

## 2025-01-17 DIAGNOSIS — F11.90 CHRONIC, CONTINUOUS USE OF OPIOIDS: ICD-10-CM

## 2025-01-17 DIAGNOSIS — G89.29 CHRONIC MIDLINE THORACIC BACK PAIN: ICD-10-CM

## 2025-01-17 DIAGNOSIS — M51.34 DDD (DEGENERATIVE DISC DISEASE), THORACIC: ICD-10-CM

## 2025-01-17 DIAGNOSIS — R07.81 RIB PAIN: ICD-10-CM

## 2025-01-17 DIAGNOSIS — G62.9 PERIPHERAL POLYNEUROPATHY: Primary | ICD-10-CM

## 2025-01-17 NOTE — TELEPHONE ENCOUNTER
Dr Be reviewed MRI and didn't find a reason for my pain and numbness. What does he do now?     Per OV from 1/2/25:  He will continue working with spine specialist for long-term plan. We did discuss what is time to reduce medications how we would work to taper. Add meloxicam 7.5 mg daily to help with pain management. Also increase gabapentin to 300 mg twice daily. He continues to be aware to not take Xanax along with hydrocodone due to respiratory risk. He has not had any aberrant behavior with medications at this time.    Routing to provider to review and respond.  Charlotte Mckeon RN on 1/17/2025 at 3:46 PM

## 2025-01-21 NOTE — TELEPHONE ENCOUNTER
I can place a referral for him. If Dr Be has someone in mind I would recommend starting there. If not, we could get a referral to [facility] specialty. ERROL Mcknight CNP on 1/21/2025 at 8:17 AM

## 2025-01-21 NOTE — TELEPHONE ENCOUNTER
Jade,    Would like referral for [facility].   Dr. Be doesn't have any recommendations.      Starting camille'ing up Neurology referral but wasn't sure wether to chose Neuromuscular or general neurology.     Rebeca Vaca RN on 1/21/2025 at 11:26 AM

## 2025-02-02 DIAGNOSIS — E78.5 HYPERLIPIDEMIA, UNSPECIFIED HYPERLIPIDEMIA TYPE: ICD-10-CM

## 2025-02-04 RX ORDER — ROSUVASTATIN CALCIUM 5 MG/1
5 TABLET, COATED ORAL DAILY
Qty: 90 TABLET | Refills: 1 | Status: SHIPPED | OUTPATIENT
Start: 2025-02-04

## 2025-02-04 NOTE — TELEPHONE ENCOUNTER
[facility] sent Rx request for the following:      Requested Prescriptions   Pending Prescriptions Disp Refills    rosuvastatin (CRESTOR) 5 MG tablet [Pharmacy Med Name: Rosuvastatin Calcium 5 MG Oral Tablet] 90 tablet 0     Sig: Take 1 tablet by mouth once daily       Antihyperlipidemic agents Passed - 2/4/2025  2:56 PM        Passed - LDL on file in the past 12 months        Passed - Medication is active on med list        Passed - Recent (12 mo) or future (90 days) visit within the authorizing provider's specialty     The patient must have completed an in-person or virtual visit within the past 12 months or has a future visit scheduled within the next 90 days with the authorizing provider s specialty.  Urgent care and e-visits do not qualify as an office visit for this protocol.          Passed - Patient is age 18 years or older             Last Prescription Date:   10/9/24  Last Fill Qty/Refills:         90, R-0    Last Office Visit:              5/24/24   Future Office visit:             Next 5 appointments (look out 90 days)      Feb 27, 2025 8:15 AM  (Arrive by 8:00 AM)  Welcome to Medicare Visit with ERROL [facility] ([facility] [address]  [place] [address]  [phone]           Prescription approved per [facility] Refill Protocol.  Belkis Lofton RN on 2/4/2025 at 3:04 PM

## 2025-02-19 ENCOUNTER — MYC MEDICAL ADVICE (OUTPATIENT)
Dept: FAMILY MEDICINE | Facility: OTHER | Age: 70
End: 2025-02-19
Payer: COMMERCIAL

## 2025-02-19 NOTE — TELEPHONE ENCOUNTER
"Neurology at [facility] said that Dr. Shannon would have to refer him.     Dr. Shannon declined to see him.     Neurologist in [place] reviewed chart and wouldn't see him.      \"Attempted to contact Jade but couldn't\".     They suggested I talk to you.     Is seeing a neurologist at [facility] still an option?           Rebeca Vaca RN on 2/19/2025 at 2:43 PM    "

## 2025-02-20 ENCOUNTER — TELEPHONE (OUTPATIENT)
Dept: NEUROLOGY | Facility: CLINIC | Age: 70
End: 2025-02-20
Payer: COMMERCIAL

## 2025-02-20 NOTE — TELEPHONE ENCOUNTER
Update:  Pt scheduled with General Neurology for 7/10/25.      They state he would need an EMG completed before seeing someone with neuromuscular team.  Or pain clinic for pain issues.      Hello,    Clinic coordinators - you could schedule as new general neurology appt. Preferred in-person.    Rebeca - looks like patient is also needing to be seen for polyneuropathy. I don't see that a EMG has been completed, where the neuromuscular team would require this to be completed prior to placing him in their clinic. We can start with general neurology to see how they can evaluate/manage his current issues and then can go from there. For pain related issues, yes he will need to be referred to pain clinic as we would be limited to treating any opioid dependence.    Thanks,  AGNIESZKA Fuentes       Called Neurology Clinics at [facility] at [phone] to look in to:    Neuromuscular Neuro referral for:        They said:  She spoke with him.  They received referral with primary diagnosis of Peripheral Polyneuropathy.  If diagnosis is for polyneuropathy, it must have a referral coming from neurologist or if not- then the referring provider needs to state specific provider.  (Can call clinic and ask about what provider to refer to).     They get a lot of polyneuropathy requests.  So the above has become a requirement.      Anything referring to pain- they refer to pain clinic.  (DDD)    In the mean time- she will send a referral to clinic to look up what they would recommend for pain management referral.      Peripheral Polyneuropathy providers:    Arely Rdz:  Dr. Geovani Olsen, Dr. Geovani Montalvo, Dr. Duke Fierro:  Dr. Bartolome Hernandez, Dr. Nicky Amaya, Dr. Galo Huynh, Dr. Yuir Stratton.      Needs new referral with one of the providers listed above.      Rebeca Vaca RN on 2/20/2025 at 1:51 PM

## 2025-02-20 NOTE — TELEPHONE ENCOUNTER
Patient confirmed scheduled appointment:  Date: 7/10/25  Time: 2pm  Visit type: New Neurology  Provider: Ade  Location: [place]  Testing/imaging: NA  Additional notes: scheduled per AGNIESZKA Fuentes on 2/20/2025 at 3:53 PM

## 2025-03-13 ENCOUNTER — TRANSCRIBE ORDERS (OUTPATIENT)
Dept: OTHER | Age: 70
End: 2025-03-13

## 2025-03-13 DIAGNOSIS — Z98.890 H/O RADIOFREQUENCY ABLATION (RFA) OF NERVE OF LUMBAR SPINE: ICD-10-CM

## 2025-03-13 DIAGNOSIS — G62.9 PERIPHERAL POLYNEUROPATHY: Primary | ICD-10-CM

## 2025-03-13 DIAGNOSIS — M54.12 CERVICAL RADICULOPATHY: ICD-10-CM

## 2025-04-16 ASSESSMENT — ANXIETY QUESTIONNAIRES
7. FEELING AFRAID AS IF SOMETHING AWFUL MIGHT HAPPEN: NOT AT ALL
IF YOU CHECKED OFF ANY PROBLEMS ON THIS QUESTIONNAIRE, HOW DIFFICULT HAVE THESE PROBLEMS MADE IT FOR YOU TO DO YOUR WORK, TAKE CARE OF THINGS AT HOME, OR GET ALONG WITH OTHER PEOPLE: NOT DIFFICULT AT ALL
2. NOT BEING ABLE TO STOP OR CONTROL WORRYING: NOT AT ALL
GAD7 TOTAL SCORE: 2
8. IF YOU CHECKED OFF ANY PROBLEMS, HOW DIFFICULT HAVE THESE MADE IT FOR YOU TO DO YOUR WORK, TAKE CARE OF THINGS AT HOME, OR GET ALONG WITH OTHER PEOPLE?: NOT DIFFICULT AT ALL
6. BECOMING EASILY ANNOYED OR IRRITABLE: NOT AT ALL
3. WORRYING TOO MUCH ABOUT DIFFERENT THINGS: NOT AT ALL
7. FEELING AFRAID AS IF SOMETHING AWFUL MIGHT HAPPEN: NOT AT ALL
GAD7 TOTAL SCORE: 2
5. BEING SO RESTLESS THAT IT IS HARD TO SIT STILL: NOT AT ALL
GAD7 TOTAL SCORE: 2
1. FEELING NERVOUS, ANXIOUS, OR ON EDGE: SEVERAL DAYS
4. TROUBLE RELAXING: SEVERAL DAYS

## 2025-04-16 ASSESSMENT — PAIN SCALES - PAIN ENJOYMENT GENERAL ACTIVITY SCALE (PEG)
PEG_TOTALSCORE: 5
AVG_PAIN_PASTWEEK: 4
INTERFERED_ENJOYMENT_LIFE: 5
INTERFERED_GENERAL_ACTIVITY: 6
INTERFERED_ENJOYMENT_LIFE: 5
AVG_PAIN_PASTWEEK: 4
PEG_TOTALSCORE: 5
INTERFERED_GENERAL_ACTIVITY: 6

## 2025-04-16 ASSESSMENT — PATIENT HEALTH QUESTIONNAIRE - PHQ9
SUM OF ALL RESPONSES TO PHQ QUESTIONS 1-9: 3
10. IF YOU CHECKED OFF ANY PROBLEMS, HOW DIFFICULT HAVE THESE PROBLEMS MADE IT FOR YOU TO DO YOUR WORK, TAKE CARE OF THINGS AT HOME, OR GET ALONG WITH OTHER PEOPLE: SOMEWHAT DIFFICULT
SUM OF ALL RESPONSES TO PHQ QUESTIONS 1-9: 3

## 2025-04-20 ENCOUNTER — HEALTH MAINTENANCE LETTER (OUTPATIENT)
Age: 70
End: 2025-04-20

## 2025-04-21 ENCOUNTER — OFFICE VISIT (OUTPATIENT)
Dept: FAMILY MEDICINE | Facility: OTHER | Age: 70
End: 2025-04-21
Attending: NURSE PRACTITIONER
Payer: COMMERCIAL

## 2025-04-21 VITALS
TEMPERATURE: 97.7 F | SYSTOLIC BLOOD PRESSURE: 124 MMHG | DIASTOLIC BLOOD PRESSURE: 86 MMHG | HEART RATE: 76 BPM | RESPIRATION RATE: 20 BRPM | BODY MASS INDEX: 42.48 KG/M2 | WEIGHT: 313.2 LBS | OXYGEN SATURATION: 95 %

## 2025-04-21 DIAGNOSIS — R82.90 CLOUDY URINE: ICD-10-CM

## 2025-04-21 DIAGNOSIS — F11.90 CHRONIC, CONTINUOUS USE OF OPIOIDS: ICD-10-CM

## 2025-04-21 DIAGNOSIS — F11.20 CONTINUOUS OPIOID DEPENDENCE (H): Primary | ICD-10-CM

## 2025-04-21 DIAGNOSIS — M51.34 DDD (DEGENERATIVE DISC DISEASE), THORACIC: ICD-10-CM

## 2025-04-21 LAB
ALBUMIN UR-MCNC: NEGATIVE MG/DL
APPEARANCE UR: CLEAR
BILIRUB UR QL STRIP: NEGATIVE
COLOR UR AUTO: NORMAL
GLUCOSE UR STRIP-MCNC: NEGATIVE MG/DL
HGB UR QL STRIP: NEGATIVE
KETONES UR STRIP-MCNC: NEGATIVE MG/DL
LEUKOCYTE ESTERASE UR QL STRIP: NEGATIVE
NITRATE UR QL: NEGATIVE
PH UR STRIP: 5.5 [PH] (ref 5–9)
SP GR UR STRIP: 1.02 (ref 1–1.03)
UROBILINOGEN UR STRIP-MCNC: NORMAL MG/DL

## 2025-04-21 PROCEDURE — 81003 URINALYSIS AUTO W/O SCOPE: CPT | Mod: ZL | Performed by: NURSE PRACTITIONER

## 2025-04-21 PROCEDURE — G0463 HOSPITAL OUTPT CLINIC VISIT: HCPCS

## 2025-04-21 RX ORDER — HYDROCODONE BITARTRATE AND ACETAMINOPHEN 5; 325 MG/1; MG/1
1 TABLET ORAL 3 TIMES DAILY PRN
Qty: 90 TABLET | Refills: 0 | Status: SHIPPED | OUTPATIENT
Start: 2025-04-21

## 2025-04-21 RX ORDER — HYDROCODONE BITARTRATE AND ACETAMINOPHEN 5; 325 MG/1; MG/1
1 TABLET ORAL 3 TIMES DAILY PRN
Qty: 90 TABLET | Refills: 0 | Status: SHIPPED | OUTPATIENT
Start: 2025-05-21

## 2025-04-21 ASSESSMENT — PAIN SCALES - GENERAL: PAINLEVEL_OUTOF10: MILD PAIN (2)

## 2025-04-21 NOTE — NURSING NOTE
Chief Complaint   Patient presents with    Follow Up     2 mo pain med review       Initial /86   Pulse 76   Temp 97.7  F (36.5  C) (Temporal)   Resp 20   Wt (!) 142.1 kg (313 lb 3.2 oz)   SpO2 95%   BMI 42.48 kg/m   Estimated body mass index is 42.48 kg/m  as calculated from the following:    Height as of 2/27/25: 1.829 m (6').    Weight as of this encounter: 142.1 kg (313 lb 3.2 oz).  Medication Review: complete    The next two questions are to help us understand your food security.  If you are feeling you need any assistance in this area, we have resources available to support you today.          2/26/2025   SDOH- Food Insecurity   Within the past 12 months, did you worry that your food would run out before you got money to buy more? N   Within the past 12 months, did the food you bought just not last and you didn t have money to get more? N         Health Care Directive:  Patient does not have a Health Care Directive: Discussed advance care planning with patient; however, patient declined at this time.    Jesi Snell, BETSYN

## 2025-04-21 NOTE — PROGRESS NOTES
Assessment & Plan   Problem List Items Addressed This Visit          Behavioral    Continuous opioid dependence (H) - Primary     Other Visit Diagnoses       DDD (degenerative disc disease), thoracic        Relevant Medications    HYDROcodone-acetaminophen (NORCO) 5-325 MG tablet    HYDROcodone-acetaminophen (NORCO) 5-325 MG tablet (Start on 5/21/2025)    Chronic, continuous use of opioids        Relevant Medications    HYDROcodone-acetaminophen (NORCO) 5-325 MG tablet    HYDROcodone-acetaminophen (NORCO) 5-325 MG tablet (Start on 5/21/2025)    Cloudy urine        Relevant Orders    UA Macroscopic with reflex to Microscopic and Culture           [place]  reviewed and as expected.  Urine drug screen and controlled substance agreement are up-to-date.  Refilled hydrocodone x 2 months.  He will follow-up in June for refill as well as annual wellness exam.  Med reconciliation completed and meloxicam removed from list as this was not helping with pain.  Continue with plans to see specialist as scheduled.    Urinalysis obtained due to concerns about sediment and cloudy urine. UA normal.      The longitudinal plan of care for the diagnosis(es)/condition(s) as documented were addressed during this visit. Due to the added complexity in care, I will continue to support Jorge L in the subsequent management and with ongoing continuity of care.    Nichole Coats is a 70 year old, presenting for the following health issues:  Follow Up (2 mo pain med review)      4/21/2025     7:53 AM   Additional Questions   Roomed by Jesi BANKS LPN     History of Present Illness       Reason for visit:  Annual medicare visit, new concerns, medication refill.   He is taking medications regularly.      Urine concerns over the past month. No blood but seems to be cloudy, sediment. No pain with urination. Does have some intermittent pelvic pain.     Stopped the meloxicam, did not feel this was helping. Does use heat and ice for pain management  along with his pain pills. Has some pain in the toes, numbness will go up to midshin at times. Right leg always numb. Does have EMG and neuro consult later this summer. Takes pain medications as prescribed.            Objective    /86   Pulse 76   Temp 97.7  F (36.5  C) (Temporal)   Resp 20   Wt (!) 142.1 kg (313 lb 3.2 oz)   SpO2 95%   BMI 42.48 kg/m    Body mass index is 42.48 kg/m .  Physical Exam   GENERAL: alert and no distress  EYES: Eyes grossly normal to inspection  RESP: lungs clear to auscultation - no rales, rhonchi or wheezes  CV: regular rate and rhythm, normal S1 S2  ABDOMEN: soft, nontender, bowel sounds normal  PSYCH: mentation appears normal, affect normal/bright    No results found for any visits on 04/21/25.          Signed Electronically by: ERROL Mcknight CNP

## 2025-04-29 ENCOUNTER — HOSPITAL ENCOUNTER (OUTPATIENT)
Dept: CT IMAGING | Facility: OTHER | Age: 70
Discharge: HOME | End: 2025-04-29
Admitting: RADIOLOGY
Payer: COMMERCIAL

## 2025-04-29 DIAGNOSIS — Z87.891 HISTORY OF SMOKING: ICD-10-CM

## 2025-04-29 DIAGNOSIS — R91.8 PULMONARY NODULES: ICD-10-CM

## 2025-04-29 DIAGNOSIS — Z12.2 SCREENING FOR LUNG CANCER: Primary | ICD-10-CM

## 2025-04-29 DIAGNOSIS — Z12.2 SCREENING FOR LUNG CANCER: ICD-10-CM

## 2025-04-29 PROCEDURE — 71271 CT THORAX LUNG CANCER SCR C-: CPT | Mod: 26 | Performed by: RADIOLOGY

## 2025-04-29 PROCEDURE — 71271 CT THORAX LUNG CANCER SCR C-: CPT

## 2025-04-30 NOTE — CONFIDENTIAL NOTE
NEUROLOGY - PRE VISIT PLANNING             Referring Provider Reason for Referral Office Visit Notes   Jade Quinn APRN CNP   Internal Peripheral polyneuropathy   H/O radiofrequency ablation (RFA) of nerve of lumbar spine   Lumbar radiculopathy   Cervical radiculopathy   My Clinical Question Is:   pain/neuropathy symptoms into legs/arms-needs EMG UE/LE bilaterally and consult  2/27/2025        IMAGING/NOTES/SCANS Status/ Location  DATE   MRI/MRA(HEAD, NECK, SPINE) Internal  External - [facility] - 3/6/2024,  1/4/2024 1/13/25      CT/CTA   N/A    EMG N/A    EEG N/A    LABS Internal    Neuropsychological testing  N/A    Additional Testing/Notes N/A      Does patient have C2C?  Year last updated Action     YES   [x]   2024   Please update at appointment if outdated more than 5 years       NO     []   N/A   Please complete C2C at appointment

## 2025-05-10 ENCOUNTER — HOSPITAL ENCOUNTER (EMERGENCY)
Facility: OTHER | Age: 70
Discharge: HOME | End: 2025-05-10
Attending: EMERGENCY MEDICINE
Payer: COMMERCIAL

## 2025-05-10 ENCOUNTER — APPOINTMENT (OUTPATIENT)
Dept: GENERAL RADIOLOGY | Facility: OTHER | Age: 70
End: 2025-05-10
Attending: EMERGENCY MEDICINE
Payer: COMMERCIAL

## 2025-05-10 VITALS
RESPIRATION RATE: 24 BRPM | SYSTOLIC BLOOD PRESSURE: 127 MMHG | HEART RATE: 76 BPM | DIASTOLIC BLOOD PRESSURE: 75 MMHG | WEIGHT: 300 LBS | BODY MASS INDEX: 38.5 KG/M2 | HEIGHT: 74 IN | TEMPERATURE: 100.1 F | OXYGEN SATURATION: 92 %

## 2025-05-10 DIAGNOSIS — R50.9 FEVER, UNSPECIFIED FEVER CAUSE: ICD-10-CM

## 2025-05-10 DIAGNOSIS — R51.9 NONINTRACTABLE HEADACHE, UNSPECIFIED CHRONICITY PATTERN, UNSPECIFIED HEADACHE TYPE: ICD-10-CM

## 2025-05-10 LAB
ALBUMIN SERPL BCG-MCNC: 4.4 G/DL (ref 3.5–5.2)
ALBUMIN UR-MCNC: 20 MG/DL
ALP SERPL-CCNC: 85 U/L (ref 40–150)
ALT SERPL W P-5'-P-CCNC: 47 U/L (ref 0–70)
ANION GAP SERPL CALCULATED.3IONS-SCNC: 13 MMOL/L (ref 7–15)
APPEARANCE UR: CLEAR
AST SERPL W P-5'-P-CCNC: 39 U/L (ref 0–45)
BASE EXCESS BLDV CALC-SCNC: 0.3 MMOL/L (ref -3–3)
BASOPHILS # BLD AUTO: 0 10E3/UL (ref 0–0.2)
BASOPHILS NFR BLD AUTO: 0 %
BILIRUB SERPL-MCNC: 0.5 MG/DL
BILIRUB UR QL STRIP: NEGATIVE
BUN SERPL-MCNC: 15.3 MG/DL (ref 8–23)
CALCIUM SERPL-MCNC: 9.1 MG/DL (ref 8.8–10.4)
CHLORIDE SERPL-SCNC: 99 MMOL/L (ref 98–107)
COLOR UR AUTO: YELLOW
CREAT SERPL-MCNC: 0.91 MG/DL (ref 0.67–1.17)
EGFRCR SERPLBLD CKD-EPI 2021: >90 ML/MIN/1.73M2
EOSINOPHIL # BLD AUTO: 0 10E3/UL (ref 0–0.7)
EOSINOPHIL NFR BLD AUTO: 0 %
ERYTHROCYTE [DISTWIDTH] IN BLOOD BY AUTOMATED COUNT: 13.4 % (ref 10–15)
FLUAV RNA SPEC QL NAA+PROBE: NEGATIVE
FLUBV RNA RESP QL NAA+PROBE: NEGATIVE
GLUCOSE SERPL-MCNC: 122 MG/DL (ref 70–99)
GLUCOSE UR STRIP-MCNC: NEGATIVE MG/DL
HCO3 BLDV-SCNC: 25 MMOL/L (ref 21–28)
HCO3 SERPL-SCNC: 22 MMOL/L (ref 22–29)
HCT VFR BLD AUTO: 42.9 % (ref 40–53)
HGB BLD-MCNC: 15.3 G/DL (ref 13.3–17.7)
HGB UR QL STRIP: ABNORMAL
HOLD SPECIMEN: NORMAL
IMM GRANULOCYTES # BLD: 0 10E3/UL
IMM GRANULOCYTES NFR BLD: 0 %
KETONES UR STRIP-MCNC: NEGATIVE MG/DL
LACTATE SERPL-SCNC: 1.1 MMOL/L (ref 0.7–2)
LEUKOCYTE ESTERASE UR QL STRIP: NEGATIVE
LYMPHOCYTES # BLD AUTO: 0.5 10E3/UL (ref 0.8–5.3)
LYMPHOCYTES NFR BLD AUTO: 10 %
MCH RBC QN AUTO: 32.2 PG (ref 26.5–33)
MCHC RBC AUTO-ENTMCNC: 35.7 G/DL (ref 31.5–36.5)
MCV RBC AUTO: 90 FL (ref 78–100)
MONOCYTES # BLD AUTO: 0.4 10E3/UL (ref 0–1.3)
MONOCYTES NFR BLD AUTO: 7 %
MUCOUS THREADS #/AREA URNS LPF: PRESENT /LPF
NEUTROPHILS # BLD AUTO: 4.3 10E3/UL (ref 1.6–8.3)
NEUTROPHILS NFR BLD AUTO: 82 %
NITRATE UR QL: NEGATIVE
NRBC # BLD AUTO: 0 10E3/UL
NRBC BLD AUTO-RTO: 0 /100
O2/TOTAL GAS SETTING VFR VENT: 21 %
OXYHGB MFR BLDV: 77 % (ref 70–75)
PCO2 BLDV: 38 MM HG (ref 40–50)
PH BLDV: 7.42 [PH] (ref 7.32–7.43)
PH UR STRIP: 5.5 [PH] (ref 5–9)
PLATELET # BLD AUTO: 199 10E3/UL (ref 150–450)
PO2 BLDV: 43 MM HG (ref 25–47)
POTASSIUM SERPL-SCNC: 4.1 MMOL/L (ref 3.4–5.3)
PROT SERPL-MCNC: 7.5 G/DL (ref 6.4–8.3)
RBC # BLD AUTO: 4.75 10E6/UL (ref 4.4–5.9)
RBC URINE: 14 /HPF
RSV RNA SPEC NAA+PROBE: NEGATIVE
SAO2 % BLDV: 78.5 % (ref 70–75)
SARS-COV-2 RNA RESP QL NAA+PROBE: NEGATIVE
SODIUM SERPL-SCNC: 134 MMOL/L (ref 135–145)
SP GR UR STRIP: 1.03 (ref 1–1.03)
SQUAMOUS EPITHELIAL: 2 /HPF
UROBILINOGEN UR STRIP-MCNC: 2 MG/DL
WBC # BLD AUTO: 5.3 10E3/UL (ref 4–11)
WBC URINE: 2 /HPF

## 2025-05-10 PROCEDURE — 96375 TX/PRO/DX INJ NEW DRUG ADDON: CPT

## 2025-05-10 PROCEDURE — 71045 X-RAY EXAM CHEST 1 VIEW: CPT

## 2025-05-10 PROCEDURE — 36415 COLL VENOUS BLD VENIPUNCTURE: CPT | Performed by: EMERGENCY MEDICINE

## 2025-05-10 PROCEDURE — 96374 THER/PROPH/DIAG INJ IV PUSH: CPT

## 2025-05-10 PROCEDURE — 83605 ASSAY OF LACTIC ACID: CPT | Performed by: EMERGENCY MEDICINE

## 2025-05-10 PROCEDURE — 99284 EMERGENCY DEPT VISIT MOD MDM: CPT | Mod: 25 | Performed by: EMERGENCY MEDICINE

## 2025-05-10 PROCEDURE — 82805 BLOOD GASES W/O2 SATURATION: CPT | Performed by: EMERGENCY MEDICINE

## 2025-05-10 PROCEDURE — 71045 X-RAY EXAM CHEST 1 VIEW: CPT | Mod: 26 | Performed by: RADIOLOGY

## 2025-05-10 PROCEDURE — 87476 LYME DIS DNA AMP PROBE: CPT | Performed by: EMERGENCY MEDICINE

## 2025-05-10 PROCEDURE — 87468 ANAPLSMA PHGCYTOPHLM AMP PRB: CPT | Performed by: EMERGENCY MEDICINE

## 2025-05-10 PROCEDURE — 80048 BASIC METABOLIC PNL TOTAL CA: CPT | Performed by: EMERGENCY MEDICINE

## 2025-05-10 PROCEDURE — 96361 HYDRATE IV INFUSION ADD-ON: CPT

## 2025-05-10 PROCEDURE — 258N000003 HC RX IP 258 OP 636: Performed by: EMERGENCY MEDICINE

## 2025-05-10 PROCEDURE — 87637 SARSCOV2&INF A&B&RSV AMP PRB: CPT | Performed by: EMERGENCY MEDICINE

## 2025-05-10 PROCEDURE — 81001 URINALYSIS AUTO W/SCOPE: CPT | Performed by: EMERGENCY MEDICINE

## 2025-05-10 PROCEDURE — 85025 COMPLETE CBC W/AUTO DIFF WBC: CPT | Performed by: EMERGENCY MEDICINE

## 2025-05-10 PROCEDURE — 250N000011 HC RX IP 250 OP 636: Mod: JZ | Performed by: EMERGENCY MEDICINE

## 2025-05-10 PROCEDURE — 99284 EMERGENCY DEPT VISIT MOD MDM: CPT | Performed by: EMERGENCY MEDICINE

## 2025-05-10 PROCEDURE — 87040 BLOOD CULTURE FOR BACTERIA: CPT | Performed by: EMERGENCY MEDICINE

## 2025-05-10 RX ORDER — METOCLOPRAMIDE HYDROCHLORIDE 5 MG/ML
5 INJECTION INTRAMUSCULAR; INTRAVENOUS ONCE
Status: COMPLETED | OUTPATIENT
Start: 2025-05-10 | End: 2025-05-10

## 2025-05-10 RX ORDER — SODIUM CHLORIDE 9 MG/ML
INJECTION, SOLUTION INTRAVENOUS CONTINUOUS
Status: DISCONTINUED | OUTPATIENT
Start: 2025-05-10 | End: 2025-05-10 | Stop reason: HOSPADM

## 2025-05-10 RX ORDER — KETOROLAC TROMETHAMINE 15 MG/ML
15 INJECTION, SOLUTION INTRAMUSCULAR; INTRAVENOUS ONCE
Status: COMPLETED | OUTPATIENT
Start: 2025-05-10 | End: 2025-05-10

## 2025-05-10 RX ORDER — DIPHENHYDRAMINE HYDROCHLORIDE 50 MG/ML
25 INJECTION, SOLUTION INTRAMUSCULAR; INTRAVENOUS ONCE
Status: COMPLETED | OUTPATIENT
Start: 2025-05-10 | End: 2025-05-10

## 2025-05-10 RX ADMIN — DIPHENHYDRAMINE HYDROCHLORIDE 25 MG: 50 INJECTION INTRAMUSCULAR; INTRAVENOUS at 13:36

## 2025-05-10 RX ADMIN — METOCLOPRAMIDE 5 MG: 5 INJECTION, SOLUTION INTRAMUSCULAR; INTRAVENOUS at 13:35

## 2025-05-10 RX ADMIN — SODIUM CHLORIDE 1000 ML: 0.9 INJECTION, SOLUTION INTRAVENOUS at 13:36

## 2025-05-10 RX ADMIN — KETOROLAC TROMETHAMINE 15 MG: 15 INJECTION, SOLUTION INTRAMUSCULAR; INTRAVENOUS at 13:36

## 2025-05-10 RX ADMIN — SODIUM CHLORIDE: 0.9 INJECTION, SOLUTION INTRAVENOUS at 13:24

## 2025-05-10 ASSESSMENT — ENCOUNTER SYMPTOMS
SHORTNESS OF BREATH: 0
ARTHRALGIAS: 0
FEVER: 1
AGITATION: 0
CHILLS: 1
VOMITING: 0
FLANK PAIN: 1
COUGH: 0
DIARRHEA: 0
HEADACHES: 1
CONSTIPATION: 0
CHEST TIGHTNESS: 0

## 2025-05-10 ASSESSMENT — COLUMBIA-SUICIDE SEVERITY RATING SCALE - C-SSRS
2. HAVE YOU ACTUALLY HAD ANY THOUGHTS OF KILLING YOURSELF IN THE PAST MONTH?: NO
1. IN THE PAST MONTH, HAVE YOU WISHED YOU WERE DEAD OR WISHED YOU COULD GO TO SLEEP AND NOT WAKE UP?: NO
6. HAVE YOU EVER DONE ANYTHING, STARTED TO DO ANYTHING, OR PREPARED TO DO ANYTHING TO END YOUR LIFE?: NO

## 2025-05-10 ASSESSMENT — ACTIVITIES OF DAILY LIVING (ADL)
ADLS_ACUITY_SCORE: 41

## 2025-05-10 NOTE — ED PROVIDER NOTES
History     Chief Complaint   Patient presents with    Fever    Headache    Flank Pain     HPI  Garland Rice is a 70 year old male who is here complaining of fever and headache high blood pressures.  This is all just began last night.  He thinks the fever started first, however headache began very shortly after that.  He states he does not normally get headaches.  Says it is around the entire upper part of his head.  When it gets really bad he will have some pain shooting up and down the right side of his neck.  Otherwise no significant neck pain or stiffness.  Fevers up to 102 degrees at home.  No shortness of breath or chest discomfort.  No URI type symptoms.  He does feel like he has not been emptying his bladder lately, urine seems cloudy.  Also complains of some right flank pain.  No change in bowel.  He is on chronic daily hydrocodone, he took 1 of these about an hour or so ago and his headache is down to a 3 out of 10 from an 8 out of 10 right now.    Allergies:  No Known Allergies    Problem List:    Patient Active Problem List    Diagnosis Date Noted    Continuous opioid dependence (H) 04/21/2025     Priority: Medium    Class 3 severe obesity due to excess calories with serious comorbidity and body mass index (BMI) of 40.0 to 44.9 in adult (H) 04/17/2024     Priority: Medium    Chronic bilateral low back pain without sciatica 12/05/2023     Priority: Medium    Rib pain on right side 12/05/2023     Priority: Medium    DDD (degenerative disc disease), lumbar 12/05/2023     Priority: Medium    Restless leg syndrome 06/06/2023     Priority: Medium    Hyperglycemia 01/19/2023     Priority: Medium    History of vasculitis of skin 01/18/2022     Priority: Medium    Peripheral polyneuropathy 01/18/2022     Priority: Medium    History of smoking 12/03/2020     Priority: Medium    Pulmonary nodules 12/03/2020     Priority: Medium    Other specified anxiety disorders 01/17/2018     Priority: Medium     Hyperlipidemia 2018     Priority: Medium    Irritable bowel syndrome 2018     Priority: Medium    H/O adenomatous polyp of colon 2016     Priority: Medium    Diverticulosis of sigmoid colon 2010     Priority: Medium        Past Medical History:    Past Medical History:   Diagnosis Date    Benign neoplasm     Colonic polyp     Erectile dysfunction     History of vasculitis of skin     Hyperlipidemia     Kidney stones     Medullary cystic kidney     Personal history of nicotine dependence        Past Surgical History:    Past Surgical History:   Procedure Laterality Date    APPENDECTOMY OPEN      No Comments Provided    AS REMOVAL OF KIDNEY STONE  2021    COLONOSCOPY      ,follow-up recommended in 6 months    COLONOSCOPY  2010,F/U     COLONOSCOPY  2016    3/7/16,F/U  tubular adenomas    COLONOSCOPY N/A 03/15/2021    F/U  h/o adenomatous polyps    URETHROPLASTY         Family History:    Family History   Problem Relation Age of Onset    Emphysema Mother     Arthritis Father     Other - See Comments Sister         Unknown    Other - See Comments Brother         Recovering addict    Coronary Artery Disease Brother 61    Colon Cancer Brother     Coronary Artery Disease Maternal Grandfather         Coronary artery disease,60- lived into 80s- smoked early    Other - See Comments Other         No FHx DM or CAD, no cancers in first degree relatives.       Social History:  Marital Status:   [2]  Social History     Tobacco Use    Smoking status: Former     Current packs/day: 0.00     Average packs/day: 0.5 packs/day for 40.0 years (20.0 ttl pk-yrs)     Types: Cigarettes     Start date: 1/3/1980     Quit date: 1/3/2020     Years since quittin.3    Smokeless tobacco: Never   Vaping Use    Vaping status: Never Used   Substance Use Topics    Alcohol use: Yes     Comment: 2-3 times per week    Drug use: Never        Medications:    ALPRAZolam (XANAX) 0.5  "MG tablet  FLUoxetine (PROZAC) 20 MG capsule  gabapentin (NEURONTIN) 300 MG capsule  HYDROcodone-acetaminophen (NORCO) 5-325 MG tablet  [START ON 5/21/2025] HYDROcodone-acetaminophen (NORCO) 5-325 MG tablet  Multiple Vitamin (MULTI-VITAMINS) TABS  naloxone (NARCAN) 4 MG/0.1ML nasal spray  rosuvastatin (CRESTOR) 5 MG tablet  vitamin C (ASCORBIC ACID) 500 MG tablet          Review of Systems   Constitutional:  Positive for chills and fever.   HENT:  Negative for congestion.    Eyes:  Negative for visual disturbance.   Respiratory:  Negative for cough, chest tightness and shortness of breath.    Cardiovascular:  Negative for chest pain.   Gastrointestinal:  Negative for constipation, diarrhea and vomiting.   Genitourinary:  Positive for flank pain.   Musculoskeletal:  Negative for arthralgias.   Skin:  Negative for rash.   Neurological:  Positive for headaches.   Psychiatric/Behavioral:  Negative for agitation.        Physical Exam   BP: (!) 151/90  Pulse: 88  Temp: 100.1  F (37.8  C)  Resp: 22  Height: 188 cm (6' 2\")  Weight: 136.1 kg (300 lb)  SpO2: 96 %      Physical Exam  Vitals and nursing note reviewed.   Constitutional:       Appearance: Normal appearance.   HENT:      Head: Normocephalic and atraumatic.      Mouth/Throat:      Mouth: Mucous membranes are moist.   Eyes:      Conjunctiva/sclera: Conjunctivae normal.   Cardiovascular:      Rate and Rhythm: Normal rate and regular rhythm.      Heart sounds: Normal heart sounds.   Pulmonary:      Effort: Pulmonary effort is normal.      Breath sounds: Normal breath sounds.   Abdominal:      General: Bowel sounds are normal. There is no distension.      Palpations: Abdomen is soft.      Tenderness: There is no abdominal tenderness.      Comments: Minimal CVA tenderness   Musculoskeletal:      Cervical back: Normal range of motion.   Skin:     General: Skin is warm and dry.   Neurological:      Mental Status: He is alert and oriented to person, place, and time. "   Psychiatric:         Mood and Affect: Mood normal.         Behavior: Behavior normal.         ED Course        Procedures                  Results for orders placed or performed during the hospital encounter of 05/10/25 (from the past 24 hours)   Chaplin Draw    Narrative    The following orders were created for panel order Chaplin Draw.  Procedure                               Abnormality         Status                     ---------                               -----------         ------                     Extra Blue Top Tube[[id number]]                             Final result               Extra Red Top Tube[[id number]]                              Final result               Extra Green Top (Lithiu...[[id number]]                      Final result               Extra Green Top (Lithiu...[[id number]]                      Final result               Extra Purple Top Tube[[id number]]                           Final result                 Please view results for these tests on the individual orders.   Extra Blue Top Tube   Result Value Ref Range    Hold Specimen JIC    Extra Red Top Tube   Result Value Ref Range    Hold Specimen JIC    Extra Green Top (Lithium Heparin) Tube   Result Value Ref Range    Hold Specimen JIC    Extra Green Top (Lithium Heparin) Tube   Result Value Ref Range    Hold Specimen JIC    Extra Purple Top Tube   Result Value Ref Range    Hold Specimen JIC    CBC with platelets differential    Narrative    The following orders were created for panel order CBC with platelets differential.  Procedure                               Abnormality         Status                     ---------                               -----------         ------                     CBC with platelets and ...[[id number]]  Abnormal            Final result                 Please view results for these tests on the individual orders.   Comprehensive metabolic panel   Result Value Ref Range    Sodium 134 (L) 135 - 145  mmol/L    Potassium 4.1 3.4 - 5.3 mmol/L    Carbon Dioxide (CO2) 22 22 - 29 mmol/L    Anion Gap 13 7 - 15 mmol/L    Urea Nitrogen 15.3 8.0 - 23.0 mg/dL    Creatinine 0.91 0.67 - 1.17 mg/dL    GFR Estimate >90 >60 mL/min/1.73m2    Calcium 9.1 8.8 - 10.4 mg/dL    Chloride 99 98 - 107 mmol/L    Glucose 122 (H) 70 - 99 mg/dL    Alkaline Phosphatase 85 40 - 150 U/L    AST 39 0 - 45 U/L    ALT 47 0 - 70 U/L    Protein Total 7.5 6.4 - 8.3 g/dL    Albumin 4.4 3.5 - 5.2 g/dL    Bilirubin Total 0.5 <=1.2 mg/dL   CBC with platelets and differential   Result Value Ref Range    WBC Count 5.3 4.0 - 11.0 10e3/uL    RBC Count 4.75 4.40 - 5.90 10e6/uL    Hemoglobin 15.3 13.3 - 17.7 g/dL    Hematocrit 42.9 40.0 - 53.0 %    MCV 90 78 - 100 fL    MCH 32.2 26.5 - 33.0 pg    MCHC 35.7 31.5 - 36.5 g/dL    RDW 13.4 10.0 - 15.0 %    Platelet Count 199 150 - 450 10e3/uL    % Neutrophils 82 %    % Lymphocytes 10 %    % Monocytes 7 %    % Eosinophils 0 %    % Basophils 0 %    % Immature Granulocytes 0 %    NRBCs per 100 WBC 0 <1 /100    Absolute Neutrophils 4.3 1.6 - 8.3 10e3/uL    Absolute Lymphocytes 0.5 (L) 0.8 - 5.3 10e3/uL    Absolute Monocytes 0.4 0.0 - 1.3 10e3/uL    Absolute Eosinophils 0.0 0.0 - 0.7 10e3/uL    Absolute Basophils 0.0 0.0 - 0.2 10e3/uL    Absolute Immature Granulocytes 0.0 <=0.4 10e3/uL    Absolute NRBCs 0.0 10e3/uL   UA with Microscopic reflex to Culture    Specimen: Urine, Midstream   Result Value Ref Range    Color Urine Yellow Colorless, Straw, Light Yellow, Yellow    Appearance Urine Clear Clear    Glucose Urine Negative Negative mg/dL    Bilirubin Urine Negative Negative    Ketones Urine Negative Negative mg/dL    Specific Gravity Urine 1.029 1.000 - 1.030    Blood Urine Trace (A) Negative    pH Urine 5.5 5.0 - 9.0    Protein Albumin Urine 20 (A) Negative mg/dL    Urobilinogen Urine 2.0 (A) Normal mg/dL    Nitrite Urine Negative Negative    Leukocyte Esterase Urine Negative Negative    Mucus Urine Present (A) None  Seen /LPF    RBC Urine 14 (H) <=2 /HPF    WBC Urine 2 <=5 /HPF    Squamous Epithelials Urine 2 (H) <=1 /HPF    Narrative    Urine Culture not indicated   Lactic Acid Whole Blood with 1X Repeat in 2 HR when >2   Result Value Ref Range    Lactic Acid, Initial 1.1 0.7 - 2.0 mmol/L   Blood gas venous   Result Value Ref Range    pH Venous 7.42 7.32 - 7.43    pCO2 Venous 38 (L) 40 - 50 mm Hg    pO2 Venous 43 25 - 47 mm Hg    Bicarbonate Venous 25 21 - 28 mmol/L    Base Excess/Deficit Venous 0.3 -3.0 - 3.0 mmol/L    FIO2 21     Oxyhemoglobin Venous 77 (H) 70 - 75 %    O2 Sat, Venous 78.5 (H) 70.0 - 75.0 %    Narrative    In healthy individuals, oxyhemoglobin (O2Hb) and oxygen saturation (SO2) are approximately equal. In the presence of dyshemoglobins, oxyhemoglobin can be considerably lower than oxygen saturation.   Influenza A/B, RSV and SARS-CoV2 PCR (COVID-19) Nose    Specimen: Nose; Swab   Result Value Ref Range    Influenza A PCR Negative Negative    Influenza B PCR Negative Negative    RSV PCR Negative Negative    SARS CoV2 PCR Negative Negative    Narrative    Testing was performed using the Xpert Xpress CoV2/Flu/RSV Assay on the [employer] GeneXpert Instrument. This test should be ordered for the detection of SARS-CoV2, influenza, and RSV viruses in individuals with signs and symptoms of respiratory tract infection. This test is for in vitro diagnostic use under the [place] FDA for laboratories certified under CLIA to perform high or moderate complexity testing. This test has been [place] FDA cleared. A negative result does not rule out the presence of PCR inhibitors in the specimen or target RNA in concentration below the limit of detection for the assay. If only one viral target is positive but coinfection with multiple targets is suspected, the sample should be re-tested with another FDA cleared, approved, or authorized test, if coninfection would change clinical management. This test was validated by the [facility]. These laboratories are certified under the Clinical Laboratory Improvement Amendments of 1988 (CLIA-88) as qualified to perfom high complexity laboratory testing.   XR Chest Port 1 View    Narrative    EXAM: XR CHEST PORT 1 VIEW  LOCATION: [facility]  DATE: 5/10/2025    INDICATION: Fever.  COMPARISON: Chest CT 4/29/2025.      Impression    IMPRESSION: Lung bases are mildly compromised from overlying soft tissues and cardiac silhouette. Otherwise, lungs are clear. Mild cardiomediastinal silhouette enlargement, exaggerated by overlapping mediastinal fat. No definite pulmonary edema. No   significant pleural effusion. No pneumothorax.         Medications   sodium chloride (PF) 0.9% PF flush 3 mL (has no administration in time range)   sodium chloride (PF) 0.9% PF flush 3 mL (has no administration in time range)   sodium chloride 0.9 % infusion ( Intravenous $New Bag 5/10/25 1324)   sodium chloride 0.9% BOLUS 1,000 mL (1,000 mLs Intravenous $New Bag 5/10/25 1336)   ketorolac (TORADOL) injection 15 mg (15 mg Intravenous $Given 5/10/25 1336)   metoclopramide (REGLAN) injection 5 mg (5 mg Intravenous $Given 5/10/25 1335)   diphenhydrAMINE (BENADRYL) injection 25 mg (25 mg Intravenous $Given 5/10/25 1336)       Assessments & Plan (with Medical Decision Making)     I have reviewed the nursing notes.    I have reviewed the findings, diagnosis, plan and need for follow up with the patient.  Patient feeling significantly better with the above interventions.  Lab work all very reassuring.  He came in reporting fevers up to 102 degrees, however normal CBC, lactic.  Urine, metabolic panel, influenza swab and chest x-ray also unrevealing.  Unclear what is causing his fever, will send out tick studies.  No neck stiffness and I doubt meningitis.  He is feeling better however and I believe he is safe to go home.  Should return if worse or not improving.        New Prescriptions    No medications on  file       Final diagnoses:   Fever, unspecified fever cause   Nonintractable headache, unspecified chronicity pattern, unspecified headache type       5/10/2025   [facility]       Yuri Kong MD  05/10/25 2443

## 2025-05-10 NOTE — ED TRIAGE NOTES
"Patient being evaluated today for 1 day history of headache, increased BP, fevers, and right flank pain. He reports hx of urethra stricture with surgical intervention in 2005. He reports decreased stream, cloudy urine with sediment, and bladder pressure x 2 weeks. Patient reports fevers up to 102 at home. He takes hydrocodone at home for chronic back pain with last dose 1145. BP (!) 151/90   Pulse 88   Temp 100.1  F (37.8  C) (Tympanic)   Resp 22   Ht 1.88 m (6' 2\")   Wt 136.1 kg (300 lb)   SpO2 96%   BMI 38.52 kg/m         Triage Assessment (Adult)       Row Name 05/10/25 1240          Triage Assessment    Airway WDL WDL        Respiratory WDL    Respiratory WDL WDL        Skin Circulation/Temperature WDL    Skin Circulation/Temperature WDL WDL        Cardiac WDL    Cardiac WDL WDL        Peripheral/Neurovascular WDL    Peripheral Neurovascular WDL WDL                     "

## 2025-05-12 ENCOUNTER — RESULTS FOLLOW-UP (OUTPATIENT)
Dept: NURSING | Facility: CLINIC | Age: 70
End: 2025-05-12
Payer: COMMERCIAL

## 2025-05-12 ENCOUNTER — MYC MEDICAL ADVICE (OUTPATIENT)
Dept: FAMILY MEDICINE | Facility: OTHER | Age: 70
End: 2025-05-12
Payer: COMMERCIAL

## 2025-05-12 LAB
A PHAGOCYTOPH DNA BLD QL NAA+PROBE: DETECTED
BABESIA DNA BLD QL NAA+PROBE: NOT DETECTED
EHRLICHIA DNA SPEC QL NAA+PROBE: NOT DETECTED

## 2025-05-12 RX ORDER — DOXYCYCLINE 100 MG/1
100 CAPSULE ORAL 2 TIMES DAILY
Qty: 28 CAPSULE | Refills: 0 | Status: SHIPPED | OUTPATIENT
Start: 2025-05-12 | End: 2025-05-26

## 2025-05-12 NOTE — TELEPHONE ENCOUNTER
Pt updating Jade Quinn of [facility].  (ER on 5/10/25)    Requesting something stronger than Hydrocodone for headache.     Offered work-in appt with DMO for this week.  (I let him know CS would require an appt)    __________________________________________________________________________      5/10/25  ER for fever/headache.        Just received a phone call from infectious disease lab.  The tickborne studies that was sent out 2 days ago have returned positive for anaplasmosis.  I called the patient and spoke to him.  He is still not feeling well.  I have called a prescription for doxycycline 100 mg twice daily for 14 days.  He will start those today.  Return to ER if worse.       Rebeca Vaca RN on 5/12/2025 at 2:48 PM

## 2025-05-12 NOTE — ED PROVIDER NOTES
Just received a phone call from infectious disease lab.  The tickborne studies that was sent out 2 days ago have returned positive for anaplasmosis.  I called the patient and spoke to him.  He is still not feeling well.  I have called a prescription for doxycycline 100 mg twice daily for 14 days.  He will start those today.  Return to ER if worse.     Yuri Kong MD  05/12/25 2925

## 2025-05-12 NOTE — TELEPHONE ENCOUNTER
The patient stated an appointment tomorrow would be great.      Betty Ferreira on 5/12/2025 at 5:00 PM

## 2025-05-13 ENCOUNTER — OFFICE VISIT (OUTPATIENT)
Dept: FAMILY MEDICINE | Facility: OTHER | Age: 70
End: 2025-05-13
Attending: NURSE PRACTITIONER
Payer: COMMERCIAL

## 2025-05-13 VITALS
HEART RATE: 72 BPM | RESPIRATION RATE: 20 BRPM | DIASTOLIC BLOOD PRESSURE: 78 MMHG | SYSTOLIC BLOOD PRESSURE: 128 MMHG | BODY MASS INDEX: 39.01 KG/M2 | OXYGEN SATURATION: 96 % | HEIGHT: 74 IN | WEIGHT: 304 LBS | TEMPERATURE: 98.1 F

## 2025-05-13 DIAGNOSIS — A77.49 ANAPLASMOSIS: ICD-10-CM

## 2025-05-13 DIAGNOSIS — R51.9 ACUTE NONINTRACTABLE HEADACHE, UNSPECIFIED HEADACHE TYPE: Primary | ICD-10-CM

## 2025-05-13 LAB — B BURGDOR DNA SPEC QL NAA+PROBE: NOT DETECTED

## 2025-05-13 PROCEDURE — 250N000011 HC RX IP 250 OP 636: Mod: JZ | Performed by: NURSE PRACTITIONER

## 2025-05-13 PROCEDURE — G0463 HOSPITAL OUTPT CLINIC VISIT: HCPCS

## 2025-05-13 PROCEDURE — 96372 THER/PROPH/DIAG INJ SC/IM: CPT | Performed by: NURSE PRACTITIONER

## 2025-05-13 RX ORDER — KETOROLAC TROMETHAMINE 30 MG/ML
60 INJECTION, SOLUTION INTRAMUSCULAR; INTRAVENOUS ONCE
Status: COMPLETED | OUTPATIENT
Start: 2025-05-13 | End: 2025-05-13

## 2025-05-13 RX ADMIN — KETOROLAC TROMETHAMINE 60 MG: 30 INJECTION, SOLUTION INTRAMUSCULAR at 15:25

## 2025-05-13 ASSESSMENT — PAIN SCALES - GENERAL: PAINLEVEL_OUTOF10: MILD PAIN (3)

## 2025-05-13 NOTE — TELEPHONE ENCOUNTER
Please assist with scheduling clinic appointment if this is still needed.  Headache likely related to infection, should improve with antibiotics.  Other treatment for headaches could include acetaminophen, ibuprofen, naproxen or other over-the-counter medications. ERROL Mcknight CNP on 5/13/2025 at 7:24 AM

## 2025-05-13 NOTE — PROGRESS NOTES
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  He has been using hydrocodone but has not used any other over-the-counter medications such as naproxen, ibuprofen or Tylenol.  Toradol given in office today.  Recommend taking anti-inflammatory such as ibuprofen or naproxen to help with headaches.  Continue with antibiotics.  Close follow-up if symptoms persist or worsen.      The longitudinal plan of care for the diagnosis(es)/condition(s) as documented were addressed during this visit. Due to the added complexity in care, I will continue to support Jorge L in the subsequent management and with ongoing continuity of care.   MED REC REQUIRED  Post Medication Reconciliation Status: discharge medications reconciled and changed, per note/orders      Subjective   Jorge L is a 70 year old, presenting for the following health issues:  ER F/U    History of Present Illness       Headaches:   Since the patient's last clinic visit, headaches are: worsened  The patient is getting headaches:  Steady  He is not able to do normal daily activities when he has a migraine.  The patient is taking the following rescue/relief medications:  Other   Patient states \"I get only a small amount of relief\" from the rescue/relief medications.   The patient is taking the following medications to prevent migraines:  No medications to prevent migraines  In the past 4 weeks, the patient has gone to an Urgent Care or Emergency Room 1 time times due to headaches.    Reason for visit:  Follow up from er visit. I would like to get meds to manage my headache. Get a recommendation for followup visit regarding my Lyme treatment    He eats 2-3 servings of fruits " "and vegetables daily.He consumes 0 sweetened beverage(s) daily.He exercises with enough effort to increase his heart rate 9 or less minutes per day.  He exercises with enough effort to increase his heart rate 3 or less days per week.   He is taking medications regularly.          ED/UC Followup:    Facility:  [facility]  Date of visit: 05/09/25  Reason for visit: Fever  Current Status: He presents to clinic today for ER follow-up.  He was seen in the emergency room on May 10, 2025  for fever and headache. Yesterday he was notified that he was positive for anaplasmosis. Started doxycycline-3 doses so far.  Continue with significant headaches.  Has been using his hydrocodone without relief of pain-taking 1/2 tablet every 3 hours. Had toradol in ER and this did help some.  Has not taking anything else over-the-counter at home such as ibuprofen, naproxen, Tylenol.      Objective    /78   Pulse 72   Temp 98.1  F (36.7  C)   Resp 20   Ht 1.88 m (6' 2\")   Wt (!) 137.9 kg (304 lb)   SpO2 96%   BMI 39.03 kg/m    Body mass index is 39.03 kg/m .  Physical Exam   GENERAL: alert and no distress  EYES: Eyes grossly normal to inspection  NEURO: Normal strength and tone, mentation intact and speech normal  PSYCH: mentation appears normal, affect normal/bright            Signed Electronically by: ERROL Mcknight CNP    "

## 2025-05-13 NOTE — NURSING NOTE
Patient presents today for ED follow up.    Medication Reconciliation Complete    Sandra Cortes LPN  5/13/2025 2:56 PM

## 2025-05-15 LAB
BACTERIA SPEC CULT: NO GROWTH
BACTERIA SPEC CULT: NO GROWTH

## 2025-06-03 ENCOUNTER — OFFICE VISIT (OUTPATIENT)
Dept: NEUROLOGY | Facility: CLINIC | Age: 70
End: 2025-06-03
Attending: NURSE PRACTITIONER
Payer: COMMERCIAL

## 2025-06-03 DIAGNOSIS — Z98.890 H/O RADIOFREQUENCY ABLATION (RFA) OF NERVE OF LUMBAR SPINE: ICD-10-CM

## 2025-06-03 DIAGNOSIS — G62.9 PERIPHERAL POLYNEUROPATHY: ICD-10-CM

## 2025-06-03 DIAGNOSIS — M54.12 CERVICAL RADICULOPATHY: ICD-10-CM

## 2025-06-03 DIAGNOSIS — G56.01 CARPAL TUNNEL SYNDROME OF RIGHT WRIST: Primary | ICD-10-CM

## 2025-06-03 PROCEDURE — 95911 NRV CNDJ TEST 9-10 STUDIES: CPT | Performed by: PSYCHIATRY & NEUROLOGY

## 2025-06-03 PROCEDURE — 95886 MUSC TEST DONE W/N TEST COMP: CPT | Performed by: PSYCHIATRY & NEUROLOGY

## 2025-06-03 NOTE — LETTER
6/3/2025      Garland Rice  [address] [facility] [id number]      Dear Colleague,    Thank you for referring your patient, Garland Rice, to the [facility] NEUR[facility]. Please see a copy of my visit note below.                        [facility]  Electrodiagnostic Laboratory                 Department of Neurology                                                                                                         Test Date:  6/3/2025    Patient: Jorge L Rice : 1955 Physician: Pepe Lemons MD   Sex: Male AGE: 70 year Ref Phys: ERROL French CNP   ID#: [id number]   Technician: Kristy Behling     History and Examination:    70-year-old man with chief complaint of paresthesias (numbness and tingling) in both feet.  He also has pain at the right side of his hip and paresthesias radiating from the lateral hip to the lateral knee joint, but not below.  As for the right upper extremity, he is experiencing intermittent pain from the right shoulder to the elbow and sometimes more distally, and infrequent intermittent paresthesias in both hands.  EMG was requested to evaluate for polyneuropathy, and/or right-sided cervical and lumbosacral radiculopathies.    Techniques:    Motor and sensory nerve conduction studies were done with surface recording electrodes. Temperature was monitored and recorded throughout the study. Upper extremities were maintained at a temperature of 32 degrees Centigrade or higher and Lower extremities were maintained at a temperature of 31 degrees Centigrade or higher.  EMG was done with a concentric needle electrode.     Results:    Right fibular (EDB), tibial (AH), median (APB), and ulnar (ADM) motor nerve conduction studies were normal.  Right median and radial antidromic sensory nerve conduction studies showed slightly attenuated SNAP amplitudes, and normal conduction velocities.  Right ulnar antidromic sensory  nerve conduction study was normal.  Right median to ulnar orthodromic (Palmar) mixed nerve conduction studies showed normal peak latency difference, but the right median peak latency was very slightly prolonged.  Bilateral sural antidromic sensory nerve conduction studies showed no responses.  Right median F-wave latencies were mildly prolonged.  Right tibial F-wave latencies were normal.    Needle EMG showed no abnormal spontaneous activity in any muscle tested.  There was mildly reduced recruitment of large, long-duration, and frequently polyphasic MUPs at the right TA and FDI.  The right medial gastrocnemius showed a few long-duration MUPs with no polyphasia, and normal recruitment.  Needle EMG of the following muscles was normal: Right vastus lateralis, adductor longus, gluteus medius, triceps, deltoid, biceps, and FCR.    Interpretation:    Abnormal study.  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  There was no convincing electrodiagnostic evidence of right cervical or lumbosacral radiculopathies from this study.  The patient's pain and paresthesias at the right lateral thigh could be due to meralgia paresthetica, especially if lumbar spine imaging does not show any L2-L3 nerve impingement.    ___________________________  Pepe Lemons MD. FAAN          Nerve Conduction Studies  Motor Sites      Latency Neg. Amp Neg. Amp Diff Segment Distance Velocity Neg. Dur Neg Area Diff Temperature Comment   Site (ms) Norm (mV) Norm (%)  cm m/s Norm (ms) (%) ( C)    Right Fibular (EDB) Motor   Ankle 4.1  < 6.0 6.1 -  Ankle-EDB 8   4.4  31    Bel Fibular Head 13.9 - 5.5 - -10 Bel Fibular Head-Ankle 38 39  > 38 4.7 -11  31.2    Pop Fossa 15.9 - 5.4 - -2 Pop Fossa-Bel Fibular Head 8 40  > 38 4.9 -2 31.2    Right Median (APB) Motor   Wrist 4.2  < 4.4 6.1  > 5.0  Wrist-APB 8   5.6  31.7    Elbow 9.3 - 5.9  > 5.0 -3 Elbow-Wrist 25 49  > 48 5.6 -4 31.7    Right Tibial (AHB) Motor   Ankle 4.0  < 6.5 5.1  > 5.0  Ankle-AH 8   6.4  31.6    Knee 16.0 - 2.5 - -51 Knee-Ankle 48 40  > 38 7.7 -25 31.7    Right Ulnar (ADM) Motor   Wrist 2.9  < 3.5 10.1  > 5.0  Wrist-ADM 8   5.9  31.7    Below Elbow 7.3 - 8.1 - -20 Below Elbow-Wrist 25 57  > 48 6.3 -15 31.6    Above Elbow 8.7 - 7.4 - -9 Above Elbow-Below Elbow 8 57  > 48 6.1 -6 31.6      Sensory Sites      Onset Lat Peak Lat Amp (O-P) Amp (P-P) Segment Distance Velocity Temperature Comment   Site ms (ms)  V Norm ( V)  cm m/s Norm ( C)    Right Median Sensory   Wrist-Dig II 2.6 3.8 9  > 10 16 Wrist-Dig II 14 54  > 48 31.5    Right Median-Ulnar Palmar Sensory        Median   Palm-Wrist 1.93 2.4 16 - 21 Palm-Wrist 8 41 - 31.4         Ulnar   Palm-Wrist 1.68 2.2 11 - 9 Palm-Wrist 8 48 - 31.4    Right Radial Sensory   Forearm-Wrist 1.90 2.5 14  > 15 16 Forearm-Wrist 10 53 - 31.6    Left Sural Sensory   Calf-Lat Malleolus NR NR NR  > 5 NR Calf-Lat Malleolus 14 NR  > 38 31.9    Right Sural Sensory   Calf-Lat Malleolus NR NR NR  > 5 NR Calf-Lat Malleolus 14 NR  > 38 30.5    Right Ulnar Sensory   Wrist-Dig V 2.4 3.1 11  > 8 39 Wrist-Dig V 12.5 52  > 48 31.6      Inter-Nerve Comparisons     Nerve 1 Value 1 Nerve 2 Value 2 Parameter Result Normal   Sensory Sites   R Median Palm-Wrist 2.4 ms R Ulnar Palm-Wrist 2.2 ms Peak Lat Diff 0.20 ms <0.40     F Wave Studies     Min-F Max-F Dispersion Persistence Mean-F F-Norm L-R Mean-F L-R Mean-F Norm F/M Ratio F-M Lat (ms)   Right Median (Abd Poll Brev)  31.7  C   32.34 34.14 1.80 83.33 33.19 <33  <2.2 3.16 28.75   Right Tibial (Abd Hallucis)  31.8  C   57.34 59.22 1.88 100.00 58.22 <61  <5.7 4.97 51.25       Electromyography     Side Muscle Ins Act Fibs/PSW Fasc HF  Amp Dur Poly Recrt Int Pat   Right Tib ant Nml None Nml 0 1+ 1+ 1+ Jeff Nml   Right Gastroc MH Nml None Nml 0 Nml 1+ 0 Nml Nml   Right Vastus lat Nml None Nml 0 Nml Nml 0 Nml Nml   Right Add longus Nml None Nml 0 Nml Nml 0 Nml Nml   Right Gluteus med Nml None Nml 0 Nml Nml 0 Nml Nml   Right FDI Nml None Nml 0 1+ 1+ 0 Jeff Nml   Right Triceps Nml None Nml 0 Nml Nml 0 Nml Nml   Right Deltoid Nml None Nml 0 Nml Nml 0 Nml Nml   Right Biceps Nml None Nml 0 Nml Nml 0 Nml Nml   Right FCR Nml None Nml 0 Nml Nml 0 Nml Nml         NCS Waveforms:    Motor                Sensory                                     Again, thank you for allowing me to participate in the care of your patient.        Sincerely,        Pepe Lemons MD    Electronically signed

## 2025-06-03 NOTE — PROGRESS NOTES
[facility]  Electrodiagnostic Laboratory                 Department of Neurology                                                                                                         Test Date:  6/3/2025    Patient: Jorge L Rice : 1955 Physician: Pepe Lemons MD   Sex: Male AGE: 70 year Ref Phys: ERROL French CNP   ID#: [id number]   Technician: Kristy Behling     History and Examination:    70-year-old man with chief complaint of paresthesias (numbness and tingling) in both feet.  He also has pain at the right side of his hip and paresthesias radiating from the lateral hip to the lateral knee joint, but not below.  As for the right upper extremity, he is experiencing intermittent pain from the right shoulder to the elbow and sometimes more distally, and infrequent intermittent paresthesias in both hands.  EMG was requested to evaluate for polyneuropathy, and/or right-sided cervical and lumbosacral radiculopathies.    Techniques:    Motor and sensory nerve conduction studies were done with surface recording electrodes. Temperature was monitored and recorded throughout the study. Upper extremities were maintained at a temperature of 32 degrees Centigrade or higher and Lower extremities were maintained at a temperature of 31 degrees Centigrade or higher.  EMG was done with a concentric needle electrode.     Results:    Right fibular (EDB), tibial (AH), median (APB), and ulnar (ADM) motor nerve conduction studies were normal.  Right median and radial antidromic sensory nerve conduction studies showed slightly attenuated SNAP amplitudes, and normal conduction velocities.  Right ulnar antidromic sensory nerve conduction study was normal.  Right median to ulnar orthodromic (Palmar) mixed nerve conduction studies showed normal peak latency difference, but the right median peak latency was very slightly prolonged.  Bilateral sural antidromic sensory nerve conduction  studies showed no responses.  Right median F-wave latencies were mildly prolonged.  Right tibial F-wave latencies were normal.    Needle EMG showed no abnormal spontaneous activity in any muscle tested.  There was mildly reduced recruitment of large, long-duration, and frequently polyphasic MUPs at the right TA and FDI.  The right medial gastrocnemius showed a few long-duration MUPs with no polyphasia, and normal recruitment.  Needle EMG of the following muscles was normal: Right vastus lateralis, adductor longus, gluteus medius, triceps, deltoid, biceps, and FCR.    Interpretation:    Abnormal study.  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  There was no convincing electrodiagnostic evidence of right cervical or lumbosacral radiculopathies from this study.  The patient's pain and paresthesias at the right lateral thigh could be due to meralgia paresthetica, especially if lumbar spine imaging does not show any L2-L3 nerve impingement.    ___________________________  Pepe Lemons MD. FAAN          Nerve Conduction Studies  Motor Sites      Latency Neg. Amp Neg. Amp Diff Segment Distance Velocity Neg. Dur Neg Area Diff Temperature Comment   Site (ms) Norm (mV) Norm (%)  cm m/s Norm (ms) (%) ( C)    Right Fibular (EDB) Motor   Ankle 4.1  < 6.0 6.1 -  Ankle-EDB 8   4.4  31    Bel Fibular Head 13.9 - 5.5 - -10 Bel Fibular Head-Ankle 38 39  > 38 4.7 -11 31.2    Pop Fossa 15.9 - 5.4 - -2 Pop Fossa-Bel Fibular Head 8 40  > 38 4.9 -2 31.2    Right Median (APB) Motor   Wrist 4.2  < 4.4 6.1  > 5.0  Wrist-APB 8   5.6  31.7    Elbow 9.3 - 5.9  > 5.0 -3 Elbow-Wrist 25 49  > 48 5.6 -4 31.7    Right Tibial (AHB) Motor    Ankle 4.0  < 6.5 5.1  > 5.0  Ankle-AH 8   6.4  31.6    Knee 16.0 - 2.5 - -51 Knee-Ankle 48 40  > 38 7.7 -25 31.7    Right Ulnar (ADM) Motor   Wrist 2.9  < 3.5 10.1  > 5.0  Wrist-ADM 8   5.9  31.7    Below Elbow 7.3 - 8.1 - -20 Below Elbow-Wrist 25 57  > 48 6.3 -15 31.6    Above Elbow 8.7 - 7.4 - -9 Above Elbow-Below Elbow 8 57  > 48 6.1 -6 31.6      Sensory Sites      Onset Lat Peak Lat Amp (O-P) Amp (P-P) Segment Distance Velocity Temperature Comment   Site ms (ms)  V Norm ( V)  cm m/s Norm ( C)    Right Median Sensory   Wrist-Dig II 2.6 3.8 9  > 10 16 Wrist-Dig II 14 54  > 48 31.5    Right Median-Ulnar Palmar Sensory        Median   Palm-Wrist 1.93 2.4 16 - 21 Palm-Wrist 8 41 - 31.4         Ulnar   Palm-Wrist 1.68 2.2 11 - 9 Palm-Wrist 8 48 - 31.4    Right Radial Sensory   Forearm-Wrist 1.90 2.5 14  > 15 16 Forearm-Wrist 10 53 - 31.6    Left Sural Sensory   Calf-Lat Malleolus NR NR NR  > 5 NR Calf-Lat Malleolus 14 NR  > 38 31.9    Right Sural Sensory   Calf-Lat Malleolus NR NR NR  > 5 NR Calf-Lat Malleolus 14 NR  > 38 30.5    Right Ulnar Sensory   Wrist-Dig V 2.4 3.1 11  > 8 39 Wrist-Dig V 12.5 52  > 48 31.6      Inter-Nerve Comparisons     Nerve 1 Value 1 Nerve 2 Value 2 Parameter Result Normal   Sensory Sites   R Median Palm-Wrist 2.4 ms R Ulnar Palm-Wrist 2.2 ms Peak Lat Diff 0.20 ms <0.40     F Wave Studies     Min-F Max-F Dispersion Persistence Mean-F F-Norm L-R Mean-F L-R Mean-F Norm F/M Ratio F-M Lat (ms)   Right Median (Abd Poll Brev)  31.7  C   32.34 34.14 1.80 83.33 33.19 <33  <2.2 3.16 28.75   Right Tibial (Abd Hallucis)  31.8  C   57.34 59.22 1.88 100.00 58.22 <61  <5.7 4.97 51.25       Electromyography     Side Muscle Ins Act Fibs/PSW Fasc HF Amp Dur Poly Recrt Int Pat   Right Tib ant Nml None Nml 0 1+ 1+ 1+ Jeff Nml   Right Gastroc MH Nml None Nml 0 Nml 1+ 0 Nml Nml   Right Vastus lat Nml None Nml 0 Nml Nml 0 Nml Nml   Right Add longus Nml None Nml 0 Nml Nml 0 Nml Nml   Right Gluteus med Nml  None Nml 0 Nml Nml 0 Nml Nml   Right FDI Nml None Nml 0 1+ 1+ 0 Jeff Nml   Right Triceps Nml None Nml 0 Nml Nml 0 Nml Nml   Right Deltoid Nml None Nml 0 Nml Nml 0 Nml Nml   Right Biceps Nml None Nml 0 Nml Nml 0 Nml Nml   Right FCR Nml None Nml 0 Nml Nml 0 Nml Nml         NCS Waveforms:    Motor                Sensory

## 2025-06-20 ENCOUNTER — OFFICE VISIT (OUTPATIENT)
Dept: FAMILY MEDICINE | Facility: OTHER | Age: 70
End: 2025-06-20
Attending: NURSE PRACTITIONER
Payer: COMMERCIAL

## 2025-06-20 VITALS
HEART RATE: 76 BPM | SYSTOLIC BLOOD PRESSURE: 126 MMHG | RESPIRATION RATE: 20 BRPM | DIASTOLIC BLOOD PRESSURE: 80 MMHG | OXYGEN SATURATION: 95 % | HEIGHT: 74 IN | BODY MASS INDEX: 39.94 KG/M2 | WEIGHT: 311.2 LBS

## 2025-06-20 DIAGNOSIS — G62.9 PERIPHERAL POLYNEUROPATHY: ICD-10-CM

## 2025-06-20 DIAGNOSIS — F11.20 CONTINUOUS OPIOID DEPENDENCE (H): ICD-10-CM

## 2025-06-20 DIAGNOSIS — M51.34 DDD (DEGENERATIVE DISC DISEASE), THORACIC: ICD-10-CM

## 2025-06-20 DIAGNOSIS — E78.5 HYPERLIPIDEMIA, UNSPECIFIED HYPERLIPIDEMIA TYPE: ICD-10-CM

## 2025-06-20 DIAGNOSIS — F41.8 OTHER SPECIFIED ANXIETY DISORDERS: ICD-10-CM

## 2025-06-20 DIAGNOSIS — F11.90 CHRONIC, CONTINUOUS USE OF OPIOIDS: ICD-10-CM

## 2025-06-20 DIAGNOSIS — Z00.00 ENCOUNTER FOR MEDICARE ANNUAL WELLNESS EXAM: Primary | ICD-10-CM

## 2025-06-20 DIAGNOSIS — F41.9 ANXIETY: ICD-10-CM

## 2025-06-20 LAB
ALBUMIN SERPL BCG-MCNC: 4.2 G/DL (ref 3.5–5.2)
ALP SERPL-CCNC: 82 U/L (ref 40–150)
ALT SERPL W P-5'-P-CCNC: 41 U/L (ref 0–70)
ANION GAP SERPL CALCULATED.3IONS-SCNC: 12 MMOL/L (ref 7–15)
AST SERPL W P-5'-P-CCNC: 33 U/L (ref 0–45)
BILIRUB SERPL-MCNC: 0.5 MG/DL
BUN SERPL-MCNC: 13 MG/DL (ref 8–23)
CALCIUM SERPL-MCNC: 9.6 MG/DL (ref 8.8–10.4)
CHLORIDE SERPL-SCNC: 105 MMOL/L (ref 98–107)
CHOLEST SERPL-MCNC: 149 MG/DL
CREAT SERPL-MCNC: 0.86 MG/DL (ref 0.67–1.17)
CREAT UR-MCNC: 140 MG/DL
EGFRCR SERPLBLD CKD-EPI 2021: >90 ML/MIN/1.73M2
FASTING STATUS PATIENT QL REPORTED: YES
FASTING STATUS PATIENT QL REPORTED: YES
GLUCOSE SERPL-MCNC: 120 MG/DL (ref 70–99)
HCO3 SERPL-SCNC: 22 MMOL/L (ref 22–29)
HDLC SERPL-MCNC: 37 MG/DL
LDLC SERPL CALC-MCNC: 77 MG/DL
NONHDLC SERPL-MCNC: 112 MG/DL
POTASSIUM SERPL-SCNC: 4.3 MMOL/L (ref 3.4–5.3)
PROT SERPL-MCNC: 7.2 G/DL (ref 6.4–8.3)
SODIUM SERPL-SCNC: 139 MMOL/L (ref 135–145)
TRIGL SERPL-MCNC: 177 MG/DL

## 2025-06-20 PROCEDURE — 82465 ASSAY BLD/SERUM CHOLESTEROL: CPT | Mod: ZL | Performed by: NURSE PRACTITIONER

## 2025-06-20 PROCEDURE — 82310 ASSAY OF CALCIUM: CPT | Mod: ZL | Performed by: NURSE PRACTITIONER

## 2025-06-20 PROCEDURE — G0463 HOSPITAL OUTPT CLINIC VISIT: HCPCS

## 2025-06-20 PROCEDURE — 36415 COLL VENOUS BLD VENIPUNCTURE: CPT | Mod: ZL | Performed by: NURSE PRACTITIONER

## 2025-06-20 PROCEDURE — 80353 DRUG SCREENING COCAINE: CPT | Mod: ZL | Performed by: NURSE PRACTITIONER

## 2025-06-20 RX ORDER — HYDROCODONE BITARTRATE AND ACETAMINOPHEN 5; 325 MG/1; MG/1
1 TABLET ORAL 3 TIMES DAILY PRN
Qty: 90 TABLET | Refills: 0 | Status: CANCELLED | OUTPATIENT
Start: 2025-07-20

## 2025-06-20 RX ORDER — ROSUVASTATIN CALCIUM 5 MG/1
5 TABLET, COATED ORAL DAILY
Qty: 90 TABLET | Refills: 1 | Status: SHIPPED | OUTPATIENT
Start: 2025-06-20

## 2025-06-20 RX ORDER — HYDROCODONE BITARTRATE AND ACETAMINOPHEN 5; 325 MG/1; MG/1
1 TABLET ORAL 3 TIMES DAILY PRN
Qty: 90 TABLET | Refills: 0 | Status: SHIPPED | OUTPATIENT
Start: 2025-06-20

## 2025-06-20 RX ORDER — HYDROCODONE BITARTRATE AND ACETAMINOPHEN 5; 325 MG/1; MG/1
1 TABLET ORAL 3 TIMES DAILY PRN
Qty: 90 TABLET | Refills: 0 | Status: CANCELLED | OUTPATIENT
Start: 2025-06-20

## 2025-06-20 RX ORDER — ALPRAZOLAM 0.5 MG
0.5 TABLET ORAL 2 TIMES DAILY PRN
Qty: 60 TABLET | Refills: 5 | Status: SHIPPED | OUTPATIENT
Start: 2025-06-20

## 2025-06-20 RX ORDER — GABAPENTIN 300 MG/1
300 CAPSULE ORAL 2 TIMES DAILY
Qty: 60 CAPSULE | Refills: 11 | Status: SHIPPED | OUTPATIENT
Start: 2025-06-20

## 2025-06-20 SDOH — HEALTH STABILITY: PHYSICAL HEALTH: ON AVERAGE, HOW MANY DAYS PER WEEK DO YOU ENGAGE IN MODERATE TO STRENUOUS EXERCISE (LIKE A BRISK WALK)?: 7 DAYS

## 2025-06-20 SDOH — HEALTH STABILITY: PHYSICAL HEALTH: ON AVERAGE, HOW MANY MINUTES DO YOU ENGAGE IN EXERCISE AT THIS LEVEL?: 30 MIN

## 2025-06-20 ASSESSMENT — ANXIETY QUESTIONNAIRES
3. WORRYING TOO MUCH ABOUT DIFFERENT THINGS: SEVERAL DAYS
5. BEING SO RESTLESS THAT IT IS HARD TO SIT STILL: SEVERAL DAYS
IF YOU CHECKED OFF ANY PROBLEMS ON THIS QUESTIONNAIRE, HOW DIFFICULT HAVE THESE PROBLEMS MADE IT FOR YOU TO DO YOUR WORK, TAKE CARE OF THINGS AT HOME, OR GET ALONG WITH OTHER PEOPLE: SOMEWHAT DIFFICULT
8. IF YOU CHECKED OFF ANY PROBLEMS, HOW DIFFICULT HAVE THESE MADE IT FOR YOU TO DO YOUR WORK, TAKE CARE OF THINGS AT HOME, OR GET ALONG WITH OTHER PEOPLE?: SOMEWHAT DIFFICULT
6. BECOMING EASILY ANNOYED OR IRRITABLE: NOT AT ALL
GAD7 TOTAL SCORE: 3
7. FEELING AFRAID AS IF SOMETHING AWFUL MIGHT HAPPEN: NOT AT ALL
4. TROUBLE RELAXING: SEVERAL DAYS
2. NOT BEING ABLE TO STOP OR CONTROL WORRYING: NOT AT ALL
GAD7 TOTAL SCORE: 3
1. FEELING NERVOUS, ANXIOUS, OR ON EDGE: NOT AT ALL
7. FEELING AFRAID AS IF SOMETHING AWFUL MIGHT HAPPEN: NOT AT ALL
GAD7 TOTAL SCORE: 3

## 2025-06-20 ASSESSMENT — PAIN SCALES - GENERAL: PAINLEVEL_OUTOF10: MILD PAIN (2)

## 2025-06-20 ASSESSMENT — SOCIAL DETERMINANTS OF HEALTH (SDOH): HOW OFTEN DO YOU GET TOGETHER WITH FRIENDS OR RELATIVES?: ONCE A WEEK

## 2025-06-20 NOTE — PROGRESS NOTES
Preventive Care Visit  [facility] AND [facility]  ERROL Mcknight CNP, Nurse Practitioner - Family  Jun 20, 2025      Assessment & Plan   Problem List Items Addressed This Visit          Endocrine    Hyperlipidemia    Relevant Medications    rosuvastatin (CRESTOR) 5 MG tablet    Other Relevant Orders    Comprehensive Metabolic Panel (Completed)    Lipid Panel (Completed)       Behavioral Health    Other specified anxiety disorders    Relevant Medications    ALPRAZolam (XANAX) 0.5 MG tablet    gabapentin (NEURONTIN) 300 MG capsule    FLUoxetine (PROZAC) 20 MG capsule    Continuous opioid dependence (H)    Relevant Orders    Drug Confirmation Panel Urine with Creat       Neurology/Sleep    Peripheral polyneuropathy    Relevant Medications    gabapentin (NEURONTIN) 300 MG capsule     Other Visit Diagnoses         Encounter for Medicare annual wellness exam    -  Primary      DDD (degenerative disc disease), thoracic        Relevant Medications    HYDROcodone-acetaminophen (NORCO) 5-325 MG tablet      Chronic, continuous use of opioids        Relevant Medications    HYDROcodone-acetaminophen (NORCO) 5-325 MG tablet      Anxiety        Relevant Medications    ALPRAZolam (XANAX) 0.5 MG tablet    gabapentin (NEURONTIN) 300 MG capsule    FLUoxetine (PROZAC) 20 MG capsule        [place]  reviewed and as expected.  Controlled substance agreement and urine drug screen were updated today.  Discussed plan of attack for reducing medications.  At this time working to work on reducing hydrocodone with plans to follow-up in about 1 month.  At that time he should be off hydrocodone.  We can then increase gabapentin if needed.  Also would recommend discontinuing fluoxetine and starting Cymbalta.  Once all these changes are made, we can slowly start weaning Xanax.  Because he has been on this for so long, he is aware that we will make this a very slow transition.  Continue working with specialty as planned.    CMP and  "lipid panel updated, rosuvastatin refilled, refilled Xanax, gabapentin and fluoxetine  Up-to-date on other preventative healthcare needs    The longitudinal plan of care for the diagnosis(es)/condition(s) as documented were addressed during this visit. Due to the added complexity in care, I will continue to support Jorge L in the subsequent management and with ongoing continuity of care.    Patient has been advised of split billing requirements and indicates understanding: Yes       BMI  Estimated body mass index is 39.96 kg/m  as calculated from the following:    Height as of this encounter: 1.88 m (6' 2\").    Weight as of this encounter: 141.2 kg (311 lb 3.2 oz).   Weight management plan: Discussed healthy diet and exercise guidelines  Reviewed preventive health counseling, as reflected in patient instructions  Counseling  Appropriate preventive services were addressed with this patient via screening, questionnaire, or discussion as appropriate for fall prevention, nutrition, physical activity, Tobacco-use cessation, social engagement, weight loss and cognition.  Checklist reviewing preventive services available has been given to the patient.  Reviewed patient's diet, addressing concerns and/or questions.   The patient was instructed to see the dentist every 6 months.   Discussed possible causes of fatigue. Patient reported safety concerns were addressed today.The patient was provided with written information regarding signs of hearing loss.   I have reviewed Opioid Use Disorder and Substance Use Disorder risk factors and made any needed referrals.         Nichole Coats is a 70 year old, presenting for the following:  Medicare Visit          History of Present Illness       Back Pain:  He presents for follow up of back pain. Patient's back pain is a chronic problem.  Location of back pain:  Right lower back, left lower back, right middle of back, left middle of back, right upper back, left upper back, right " side of neck, left side of neck, right shoulder, right buttock and right hip  Description of back pain: burning, dull ache, gnawing, sharp, shooting and stabbing  Back pain spreads: right buttocks, right thigh, right shoulder and right side of neck    Since patient first noticed back pain, pain is: always present, but gets better and worse  Does back pain interfere with his job:  Yes   He exercises with enough effort to increase his heart rate 30 to 60 minutes per day.  He exercises with enough effort to increase his heart rate 7 days per week.   He is taking medications regularly.    He presents to clinic today for annual wellness exam.  He does need refills of medications and labs to be updated.    He has an appointment in the next 1 to 2 months with neurology after EMG was completed.  Him and his wife have been talking, wondering about getting off of some of his medications.  He is interested in reducing Xanax, hydrocodone and others.  He has been on Xanax for 10 to 15 years.  Does report that his pain feels better if he does less but he does get some swelling to his low back when he is active and having increased pain.    Advance Care Planning    Discussed advance care planning with patient; informed AV[facility] has link to [facility].        6/20/2025   General Health   How would you rate your overall physical health? Good   Feel stress (tense, anxious, or unable to sleep) Only a little   (!) STRESS CONCERN      6/20/2025   Nutrition   Diet: Regular (no restrictions)         6/20/2025   Exercise   Days per week of moderate/strenous exercise 7 days   Average minutes spent exercising at this level 30 min         6/20/2025   Social Factors   Frequency of gathering with friends or relatives Once a week   Worry food won't last until get money to buy more No   Food not last or not have enough money for food? No   Do you have housing? (Housing is defined as stable permanent housing and does not include staying outside  in a car, in a tent, in an abandoned building, in an overnight shelter, or couch-surfing.) Yes   Are you worried about losing your housing? No   Lack of transportation? No   Unable to get utilities (heat,electricity)? No         6/20/2025   Fall Risk   Fallen 2 or more times in the past year? No   Trouble with walking or balance? Yes           6/20/2025   Activities of Daily Living- Home Safety   Needs help with the following daily activites None of the above   Safety concerns in the home No grab bars in the bathroom         6/20/2025   Dental   Dentist two times every year? (!) NO         6/20/2025   Hearing Screening   Hearing concerns? (!) I FEEL THAT PEOPLE ARE MUMBLING OR NOT SPEAKING CLEARLY.    (!) I NEED TO ASK PEOPLE TO SPEAK UP OR REPEAT THEMSELVES.    (!) IT'S HARDER TO UNDERSTAND WOMEN'S VOICES THAN MEN'S VOICES.    (!) IT'S HARD TO FOLLOW A CONVERSATION IN A NOISY RESTAURANT OR CROWDED ROOM.    (!) TROUBLE UNDERSTANDING SOFT OR WHISPERED SPEECH.    (!) TROUBLE UNDERSTANDING SPEECH ON THE TELEPHONE   Would you like a referral for hearing testing? No       Multiple values from one day are sorted in reverse-chronological order         6/20/2025   Driving Risk Screening   Patient/family members have concerns about driving No         6/20/2025   General Alertness/Fatigue Screening   Have you been more tired than usual lately? (!) YES         6/20/2025   Urinary Incontinence Screening   Bothered by leaking urine in past 6 months No         Today's PHQ-2 Score:       6/20/2025     6:55 AM   PHQ-2 ( 1999 [employer])   Q1: Little interest or pleasure in doing things 1   Q2: Feeling down, depressed or hopeless 0   PHQ-2 Score 1    Q1: Little interest or pleasure in doing things Several days   Q2: Feeling down, depressed or hopeless Not at all   PHQ-2 Score 1       Patient-reported           6/20/2025   Substance Use   Alcohol more than 3/day or more than 7/wk No   Do you have a current opioid prescription? (!) YES    How severe/bad is pain from 1 to 10? 6/10   Do you use any other substances recreationally? No          No data to display              Low Risk (0-3)  Moderate Risk (4-7)  High Risk (>8)  Social History     Tobacco Use    Smoking status: Former     Current packs/day: 0.00     Average packs/day: 0.5 packs/day for 40.0 years (20.0 ttl pk-yrs)     Types: Cigarettes     Start date: 1/3/1980     Quit date: 1/3/2020     Years since quittin.4    Smokeless tobacco: Never   Vaping Use    Vaping status: Never Used   Substance Use Topics    Alcohol use: Yes     Comment: 2-3 times per week    Drug use: Never       Last PSA:   Prostate Specific Antigen Screen   Date Value Ref Range Status   2024 0.50 0.00 - 4.50 ng/mL Final     PSA Tumor Marker   Date Value Ref Range Status   2023 0.43 0.00 - 4.50 ng/mL Final   2022 0.40 0.00 - 4.00 ug/L Final     ASCVD Risk   The 10-year ASCVD risk score (Brandon NICHOLSON, et al., 2019) is: 15.1%    Values used to calculate the score:      Age: 70 years      Sex: Male      Is Non- : No      Diabetic: No      Tobacco smoker: No      Systolic Blood Pressure: 126 mmHg      Is BP treated: No      HDL Cholesterol: 37 mg/dL      Total Cholesterol: 149 mg/dL            Reviewed and updated as needed this visit by Provider   Tobacco  Allergies  Meds  Problems  Med Hx  Surg Hx  Fam Hx            Current providers sharing in care for this patient include:  Patient Care Team:  Jade Quinn APRN CNP as PCP - General (Nurse Practitioner - Family)  Jade Quinn APRN CNP as Assigned PCP  Jade Quinn APRN CNP as Assigned Pain Medication Provider  Yuri Palafox MD as Assigned Sleep Provider    The following health maintenance items are reviewed in Epic and correct as of today:  Health Maintenance   Topic Date Due    HEPATITIS A VACCINE (1 of 2 - Risk 2-dose series) Never done    COVID-19 VACCINE ( season)  "04/25/2025    URINE DRUG SCREEN  06/21/2025    COLORECTAL CANCER SCREENING  03/15/2026    PHQ-9  04/21/2026    LUNG CANCER SCREENING  04/29/2026    MEDICARE ANNUAL WELLNESS VISIT  06/20/2026    LIPID  06/20/2026    SHARAD ASSESSMENT  06/20/2026    FALL RISK ASSESSMENT  06/20/2026    CONTROLLED SUBSTANCE AGREEMENT FOR CHRONIC PAIN MANAGEMENT  06/20/2026    DIABETES SCREENING  06/20/2028    ADVANCE CARE PLANNING  04/21/2030    RSV VACCINE (1 - 1-dose 75+ series) 02/25/2032    DTAP/TDAP/TD VACCINE (5 - Td or Tdap) 01/08/2035    HEPATITIS C SCREENING  Completed    PHQ-2 (once per calendar year)  Completed    INFLUENZA VACCINE  Completed    PNEUMOCOCCAL VACCINE 50+ YEARS  Completed    ZOSTER VACCINE  Completed    AORTIC ANEURYSM SCREENING (SYSTEM ASSIGNED)  Completed    HPV VACCINE  Aged Out    MENINGITIS VACCINE  Aged Out            Objective    Exam  /80   Pulse 76   Resp 20   Ht 1.88 m (6' 2\")   Wt (!) 141.2 kg (311 lb 3.2 oz)   SpO2 95%   BMI 39.96 kg/m     Estimated body mass index is 39.96 kg/m  as calculated from the following:    Height as of this encounter: 1.88 m (6' 2\").    Weight as of this encounter: 141.2 kg (311 lb 3.2 oz).    Physical Exam  GENERAL: alert and no distress  EYES: Eyes grossly normal to inspection, PERRL and conjunctivae and sclerae normal  HENT: ear canals and TM's normal, nose and mouth without ulcers or lesions  NECK: no adenopathy, no asymmetry, masses, or scars  RESP: lungs clear to auscultation - no rales, rhonchi or wheezes  CV: regular rate and rhythm, normal S1 S2, no S3 or S4, no murmur, click or rub, no peripheral edema  ABDOMEN: soft, nontender, no hepatosplenomegaly, no masses and bowel sounds normal  MS: no gross musculoskeletal defects noted, no edema  SKIN: no suspicious lesions or rashes  NEURO: Normal strength and tone, mentation intact and speech normal  PSYCH: mentation appears normal, affect normal/bright        6/20/2025   Mini Cog   Clock Draw Score 2 Normal "   3 Item Recall 2 objects recalled   Mini Cog Total Score 4              Signed Electronically by: ERROL Mcknight CNP

## 2025-06-20 NOTE — LETTER

## 2025-06-20 NOTE — LETTER

## 2025-06-20 NOTE — PATIENT INSTRUCTIONS
Hydrocodone 2 1/2 tablets daily for 4-5 days then  Hydrocodone 2 tablets daily for 4-5 days then  Hydrocodone 1 1/2 tablets daily for 4-5 days then  Hydrocodone 1 tablet daily for 4-5 days then  Hydrocodone 1/2 tablet daily for 4-5 days then stop      Patient Education   Preventive Care Advice   This is general advice given by our system to help you stay healthy. However, your care team may have specific advice just for you. Please talk to your care team about your preventive care needs.  Nutrition  Eat 5 or more servings of fruits and vegetables each day.  Try wheat bread, brown rice and whole grain pasta (instead of white bread, rice, and pasta).  Get enough calcium and vitamin D. Check the label on foods and aim for 100% of the RDA (recommended daily allowance).  Lifestyle  Exercise at least 150 minutes each week  (30 minutes a day, 5 days a week).  Do muscle strengthening activities 2 days a week. These help control your weight and prevent disease.  No smoking.  Wear sunscreen to prevent skin cancer.  Have a dental exam and cleaning every 6 months.  Yearly exams  See your health care team every year to talk about:  Any changes in your health.  Any medicines your care team has prescribed.  Preventive care, family planning, and ways to prevent chronic diseases.  Shots (vaccines)   HPV shots (up to age 26), if you've never had them before.  Hepatitis B shots (up to age 59), if you've never had them before.  COVID-19 shot: Get this shot when it's due.  Flu shot: Get a flu shot every year.  Tetanus shot: Get a tetanus shot every 10 years.  Pneumococcal, hepatitis A, and RSV shots: Ask your care team if you need these based on your risk.  Shingles shot (for age 50 and up)  General health tests  Diabetes screening:  Starting at age 35, Get screened for diabetes at least every 3 years.  If you are younger than age 35, ask your care team if you should be screened for diabetes.  Cholesterol test: At age 39, start having a  cholesterol test every 5 years, or more often if advised.  Bone density scan (DEXA): At age 50, ask your care team if you should have this scan for osteoporosis (brittle bones).  Hepatitis C: Get tested at least once in your life.  STIs (sexually transmitted infections)  Before age 24: Ask your care team if you should be screened for STIs.  After age 24: Get screened for STIs if you're at risk. You are at risk for STIs (including HIV) if:  You are sexually active with more than one person.  You don't use condoms every time.  You or a partner was diagnosed with a sexually transmitted infection.  If you are at risk for HIV, ask about PrEP medicine to prevent HIV.  Get tested for HIV at least once in your life, whether you are at risk for HIV or not.  Cancer screening tests  Cervical cancer screening: If you have a cervix, begin getting regular cervical cancer screening tests starting at age 21.  Breast cancer scan (mammogram): If you've ever had breasts, begin having regular mammograms starting at age 40. This is a scan to check for breast cancer.  Colon cancer screening: It is important to start screening for colon cancer at age 45.  Have a colonoscopy test every 10 years (or more often if you're at risk) Or, ask your provider about stool tests like a FIT test every year or Cologuard test every 3 years.  To learn more about your testing options, visit:   .  For help making a decision, visit:   https://bit.ly/zx67492.  Prostate cancer screening test: If you have a prostate, ask your care team if a prostate cancer screening test (PSA) at age 55 is right for you.  Lung cancer screening: If you are a current or former smoker ages 50 to 80, ask your care team if ongoing lung cancer screenings are right for you.  For informational purposes only. Not to replace the advice of your health care provider. Copyright   2023 [employer]. All rights reserved. Clinically reviewed by the [facility] Transitions  Program. MADS 368342 - REV 01/24.  Learning About Activities of Daily Living  What are activities of daily living?     Activities of daily living (ADLs) are the basic self-care tasks you do every day. These include eating, bathing, dressing, and moving around.  As you age, and if you have health problems, you may find that it's harder to do some of these tasks. If so, your doctor can suggest ideas that may help.  To measure what kind of help you may need, your doctor will ask how well you are able to do ADLs. Let your doctor know if there are any tasks that you are having trouble doing. This is an important first step to getting help. And when you have the help you need, you can stay as independent as possible.  How will a doctor assess your ADLs?  Asking about ADLs is part of a routine health checkup your doctor will likely do as you age. Your health check might be done in a doctor's office, in your home, or at a hospital. The goal is to find out if you are having any problems that could make it hard to care for yourself or that make it unsafe for you to be on your own.  To measure your ADLs, your doctor will ask how hard it is for you to do routine tasks. Your doctor may also want to know if you have changed the way you do a task because of a health problem. Your doctor may watch how you:  Walk back and forth.  Keep your balance while you stand or walk.  Move from sitting to standing or from a bed to a chair.  Button or unbutton a shirt or sweater.  Remove and put on your shoes.  It's common to feel a little worried or anxious if you find you can't do all the things you used to be able to do. Talking with your doctor about ADLs is a way to make sure you're as safe as possible and able to care for yourself as well as you can. You may want to bring a caregiver, friend, or family member to your checkup. They can help you talk to your doctor.  Follow-up care is a key part of your treatment and safety. Be sure to  make and go to all appointments, and call your doctor if you are having problems. It's also a good idea to know your test results and keep a list of the medicines you take.  Current as of: October 24, 2024  Content Version: 14.5    8425-9660 [employer].   Care instructions adapted under license by your healthcare professional. If you have questions about a medical condition or this instruction, always ask your healthcare professional. [employer] disclaims any warranty or liability for your use of this information.    Preventing Falls: Care Instructions  Injuries and health problems such as trouble walking or poor eyesight can increase your risk of falling. So can some medicines. But there are things you can do to help prevent falls. You can exercise to get stronger. You can also arrange your home to make it safer.    Talk to your doctor about the medicines you take. Ask if any of them increase the risk of falls and whether they can be changed or stopped.   Try to exercise regularly. It can help improve your strength and balance. This can help lower your risk of falling.         Practice fall safety and prevention.   Wear low-heeled shoes that fit well and give your feet good support. Talk to your doctor if you have foot problems that make this hard.  Carry a cellphone or wear a medical alert device that you can use to call for help.  Use stepladders instead of chairs to reach high objects. Don't climb if you're at risk for falls. Ask for help, if needed.  Wear the correct eyeglasses, if you need them.        Make your home safer.   Remove rugs, cords, clutter, and furniture from walkways.  Keep your house well lit. Use night-lights in hallways and bathrooms.  Install and use sturdy handrails on stairways.  Wear nonskid footwear, even inside. Don't walk barefoot or in socks without shoes.        Be safe outside.   Use handrails, curb cuts, and ramps whenever possible.  Keep your hands free by  "using a shoulder bag or backpack.  Try to walk in well-lit areas. Watch out for uneven ground, changes in pavement, and debris.  Be careful in the winter. Walk on the grass or gravel when sidewalks are slippery. Use de-icer on steps and walkways. Add non-slip devices to shoes.    Put grab bars and nonskid mats in your shower or tub and near the toilet. Try to use a shower chair or bath bench when bathing.   Get into a tub or shower by putting in your weaker leg first. Get out with your strong side first. Have a phone or medical alert device in the bathroom with you.   Where can you learn more?  Go to https://www.[employer]/patiented  Enter G117 in the search box to learn more about \"Preventing Falls: Care Instructions.\"  Current as of: July 31, 2024  Content Version: 14.5 2024-2025 [employer].   Care instructions adapted under license by your healthcare professional. If you have questions about a medical condition or this instruction, always ask your healthcare professional. [employer] disclaims any warranty or liability for your use of this information.    Hearing Loss: Care Instructions  Overview     Hearing loss is a sudden or slow decrease in how well you hear. It can range from slight to profound. Permanent hearing loss can occur with aging. It also can happen when you are exposed long-term to loud noise. Examples include listening to loud music, riding motorcycles, or being around other loud machines.  Hearing loss can affect your work and home life. It can make you feel lonely or depressed. You may feel that you have lost your independence. But hearing aids and other devices can help you hear better and feel connected to others.  Follow-up care is a key part of your treatment and safety. Be sure to make and go to all appointments, and call your doctor if you are having problems. It's also a good idea to know your test results and keep a list of the medicines you take.  How can " you care for yourself at home?  Avoid loud noises whenever possible. This helps keep your hearing from getting worse.  Always wear hearing protection around loud noises.  Wear a hearing aid as directed.  A professional can help you pick a hearing aid that will work best for you.  You can also get hearing aids over the counter for mild to moderate hearing loss.  Have hearing tests as your doctor suggests. They can show whether your hearing has changed. Your hearing aid may need to be adjusted.  Use other devices as needed. These may include:  Telephone amplifiers and hearing aids that can connect to a television, stereo, radio, or microphone.  Devices that use lights or vibrations. These alert you to the doorbell, a ringing telephone, or a baby monitor.  Television closed-captioning. This shows the words at the bottom of the screen. Most new TVs can do this.  TTY (text telephone). This lets you type messages back and forth on the telephone instead of talking or listening. These devices are also called TDD. When messages are typed on the keyboard, they are sent over the phone line to a receiving TTY. The message is shown on a monitor.  Use text messaging, social media, and email if it is hard for you to communicate by telephone.  Try to learn a listening technique called speechreading. It is not lipreading. You pay attention to people's gestures, expressions, posture, and tone of voice. These clues can help you understand what a person is saying. Face the person you are talking to, and have them face you. Make sure the lighting is good. You need to see the other person's face clearly.  Think about counseling if you need help to adjust to your hearing loss.  When should you call for help?  Watch closely for changes in your health, and be sure to contact your doctor if:    You think your hearing is getting worse.     You have new symptoms, such as dizziness or nausea.   Where can you learn more?  Go to  "https://www.[employer]net/patiented  Enter R798 in the search box to learn more about \"Hearing Loss: Care Instructions.\"  Current as of: October 27, 2024  Content Version: 14.5 2024-2025 [employer].   Care instructions adapted under license by your healthcare professional. If you have questions about a medical condition or this instruction, always ask your healthcare professional. [employer] disclaims any warranty or liability for your use of this information.    Learning About Sleeping Well  What does sleeping well mean?     Sleeping well means getting enough sleep to feel good and stay healthy. How much sleep is enough varies among people.  The number of hours you sleep and how you feel when you wake up are both important. If you do not feel refreshed, you probably need more sleep. Another sign of not getting enough sleep is feeling tired during the day.  Experts recommend that adults get at least 7 or more hours of sleep per day. Children and older adults need more sleep.  Why is getting enough sleep important?  Getting enough quality sleep is a basic part of good health. When your sleep suffers, your physical health, mood, and your thoughts can suffer too. You may find yourself feeling more grumpy or stressed. Not getting enough sleep also can lead to serious problems, including injury, accidents, anxiety, and depression.  What might cause poor sleeping?  Many things can cause sleep problems, including:  Changes to your sleep schedule.  Stress. Stress can be caused by fear about a single event, such as giving a speech. Or you may have ongoing stress, such as worry about work or school.  Depression, anxiety, and other mental or emotional conditions.  Changes in your sleep habits or surroundings. This includes changes that happen where you sleep, such as noise, light, or sleeping in a different bed. It also includes changes in your sleep pattern, such as having jet lag or working a late " "shift.  Health problems, such as pain, breathing problems, and restless legs syndrome.  Lack of regular exercise.  Using alcohol, nicotine, or caffeine before bed.  How can you help yourself?  Here are some tips that may help you sleep more soundly and wake up feeling more refreshed.  Your sleeping area   Use your bedroom only for sleeping and sex. A bit of light reading may help you fall asleep. But if it doesn't, do your reading elsewhere in the house. Try not to use your TV, computer, smartphone, or tablet while you are in bed.  Be sure your bed is big enough to stretch out comfortably, especially if you have a sleep partner.  Keep your bedroom quiet, dark, and cool. Use curtains, blinds, or a sleep mask to block out light. To block out noise, use earplugs, soothing music, or a \"white noise\" machine.  Your evening and bedtime routine   Create a relaxing bedtime routine. You might want to take a warm shower or bath, or listen to soothing music.  Go to bed at the same time every night. And get up at the same time every morning, even if you feel tired.  What to avoid   Limit caffeine (coffee, tea, caffeinated sodas) during the day, and don't have any for at least 6 hours before bedtime.  Avoid drinking alcohol before bedtime. Alcohol can cause you to wake up more often during the night.  Try not to smoke or use tobacco, especially in the evening. Nicotine can keep you awake.  Limit naps during the day, especially close to bedtime.  Avoid lying in bed awake for too long. If you can't fall asleep or if you wake up in the middle of the night and can't get back to sleep within about 20 minutes, get out of bed and go to another room until you feel sleepy.  Avoid taking medicine right before bed that may keep you awake or make you feel hyper or energized. Your doctor can tell you if your medicine may do this and if you can take it earlier in the day.  If you can't sleep   Imagine yourself in a peaceful, pleasant scene. " "Focus on the details and feelings of being in a place that is relaxing.  Get up and do a quiet or boring activity until you feel sleepy.  Avoid drinking any liquids before going to bed to help prevent waking up often to use the bathroom.  Where can you learn more?  Go to https://www.[employer].net/patiented  Enter J942 in the search box to learn more about \"Learning About Sleeping Well.\"  Current as of: July 31, 2024  Content Version: 14.5 2024-2025 [employer].   Care instructions adapted under license by your healthcare professional. If you have questions about a medical condition or this instruction, always ask your healthcare professional. [employer] disclaims any warranty or liability for your use of this information.    Chronic Pain: Care Instructions  Your Care Instructions     Chronic pain is pain that lasts a long time (months or even years) and may or may not have a clear cause. It is different from acute pain, which usually does have a clear cause--like an injury or illness--and gets better over time. Chronic pain:  Lasts over time but may vary from day to day.  Does not go away despite efforts to end it.  May disrupt your sleep and lead to fatigue.  May cause depression or anxiety.  May make your muscles tense, causing more pain.  Can disrupt your work, hobbies, home life, and relationships with friends and family.  Chronic pain is a very real condition. It is not just in your head. Treatment can help and usually includes several methods used together, such as medicines, physical therapy, exercise, and other treatments. Learning how to relax and changing negative thought patterns can also help you cope.  Chronic pain is complex. Taking an active role in your treatment will help you better manage your pain. Tell your doctor if you have trouble dealing with your pain. You may have to try several things before you find what works best for you.  Follow-up care is a key part of your " treatment and safety. Be sure to make and go to all appointments, and call your doctor if you are having problems. It's also a good idea to know your test results and keep a list of the medicines you take.  How can you care for yourself at home?  Pace yourself. Break up large jobs into smaller tasks. Save harder tasks for days when you have less pain, or go back and forth between hard tasks and easier ones. Take rest breaks.  Relax, and reduce stress. Relaxation techniques such as deep breathing or meditation can help.  Keep moving. Gentle, daily exercise can help reduce pain over the long run. Try low- or no-impact exercises such as walking, swimming, and stationary biking. Do stretches to stay flexible.  Try heat, cold packs, and massage.  Get enough sleep. Chronic pain can make you tired and drain your energy. Talk with your doctor if you have trouble sleeping because of pain.  Think positive. Your thoughts can affect your pain level. Do things that you enjoy to distract yourself when you have pain instead of focusing on the pain. See a movie, read a book, listen to music, or spend time with a friend.  If you think you are depressed, talk to your doctor about treatment.  Keep a daily pain diary. Record how your moods, thoughts, sleep patterns, activities, and medicine affect your pain. You may find that your pain is worse during or after certain activities or when you are feeling a certain emotion. Having a record of your pain can help you and your doctor find the best ways to treat your pain.  Take pain medicines exactly as directed.  If the doctor gave you a prescription medicine for pain, take it as prescribed.  If you are not taking a prescription pain medicine, ask your doctor if you can take an over-the-counter medicine.  Reducing constipation caused by pain medicine  Talk to your doctor about a laxative. If a laxative doesn't work, your doctor may suggest a prescription medicine.  Include fruits,  "vegetables, beans, and whole grains in your diet each day. These foods are high in fiber.  If your doctor recommends it, get more exercise. Walking is a good choice. Bit by bit, increase the amount you walk every day. Try for at least 30 minutes on most days of the week.  Schedule time each day for a bowel movement. A daily routine may help. Take your time and do not strain when having a bowel movement.  When should you call for help?   Call your doctor now or seek immediate medical care if:    Your pain gets worse or is out of control.     You feel down or blue, or you do not enjoy things like you once did. You may be depressed, which is common in people with chronic pain. Depression can be treated.     You have vomiting or cramps for more than 2 hours.   Watch closely for changes in your health, and be sure to contact your doctor if:    You cannot sleep because of pain.     You are very worried or anxious about your pain.     You have trouble taking your pain medicine.     You have any concerns about your pain medicine.     You have trouble with bowel movements, such as:  No bowel movement in 3 days.  Blood in the anal area, in your stool, or on the toilet paper.  Diarrhea for more than 24 hours.   Where can you learn more?  Go to https://www.[employer].net/patiented  Enter N004 in the search box to learn more about \"Chronic Pain: Care Instructions.\"  Current as of: July 31, 2024  Content Version: 14.5    6826-7151 [employer].   Care instructions adapted under license by your healthcare professional. If you have questions about a medical condition or this instruction, always ask your healthcare professional. [employer] disclaims any warranty or liability for your use of this information.       "

## 2025-06-24 ENCOUNTER — RESULTS FOLLOW-UP (OUTPATIENT)
Dept: FAMILY MEDICINE | Facility: OTHER | Age: 70
End: 2025-06-24

## 2025-06-24 LAB
A-OH ALPRAZ UR QL CFM: PRESENT
ALPRAZ UR QL CFM: PRESENT
DHC UR CFM-MCNC: 570 NG/ML
DHC/CREAT UR: 407 NG/MG {CREAT}
GABAPENTIN UR QL CFM: PRESENT
HYDROCODONE UR CFM-MCNC: 977 NG/ML
HYDROCODONE/CREAT UR: 698 NG/MG {CREAT}
HYDROMORPHONE UR CFM-MCNC: 60 NG/ML
HYDROMORPHONE/CREAT UR: 43 NG/MG {CREAT}

## 2025-07-10 ENCOUNTER — OFFICE VISIT (OUTPATIENT)
Dept: NEUROLOGY | Facility: CLINIC | Age: 70
End: 2025-07-10
Payer: COMMERCIAL

## 2025-07-10 ENCOUNTER — PRE VISIT (OUTPATIENT)
Dept: NEUROLOGY | Facility: CLINIC | Age: 70
End: 2025-07-10

## 2025-07-10 VITALS
WEIGHT: 306.1 LBS | DIASTOLIC BLOOD PRESSURE: 81 MMHG | SYSTOLIC BLOOD PRESSURE: 145 MMHG | OXYGEN SATURATION: 97 % | BODY MASS INDEX: 39.3 KG/M2 | HEART RATE: 63 BPM

## 2025-07-10 DIAGNOSIS — R79.89 OTHER SPECIFIED ABNORMAL FINDINGS OF BLOOD CHEMISTRY: ICD-10-CM

## 2025-07-10 DIAGNOSIS — G62.9 PERIPHERAL POLYNEUROPATHY: ICD-10-CM

## 2025-07-10 DIAGNOSIS — G89.29 CHRONIC MIDLINE THORACIC BACK PAIN: ICD-10-CM

## 2025-07-10 DIAGNOSIS — R90.89 OTHER ABNORMAL FINDINGS ON DIAGNOSTIC IMAGING OF CENTRAL NERVOUS SYSTEM: ICD-10-CM

## 2025-07-10 DIAGNOSIS — Z98.890 H/O RADIOFREQUENCY ABLATION (RFA) OF NERVE OF LUMBAR SPINE: ICD-10-CM

## 2025-07-10 DIAGNOSIS — M51.34 DDD (DEGENERATIVE DISC DISEASE), THORACIC: ICD-10-CM

## 2025-07-10 DIAGNOSIS — M54.6 CHRONIC MIDLINE THORACIC BACK PAIN: ICD-10-CM

## 2025-07-10 DIAGNOSIS — R07.81 RIB PAIN: ICD-10-CM

## 2025-07-10 DIAGNOSIS — F11.90 CHRONIC, CONTINUOUS USE OF OPIOIDS: ICD-10-CM

## 2025-07-10 RX ORDER — GABAPENTIN 300 MG/1
600 CAPSULE ORAL 3 TIMES DAILY
Qty: 60 CAPSULE | Refills: 11 | Status: SHIPPED | OUTPATIENT
Start: 2025-07-10

## 2025-07-10 NOTE — PROGRESS NOTES
"  Neurology Consultation    Patient Name:  Garland Rice  MRN:  [id number]    :  1955  Date of Service:  July 10, 2025  Primary care provider:  Jade Quinn        Chief Complaint: Neuropathy     History of Present Illness:     Garland Rice is a 70 year old male who presents for evaluation of neuropathy.  He reports starting several years started noticing numbness and tingling starting in the middle left toe and then went to the right middle toe. It then spread to the rest of the toes. Gradually spread to both feet and now has worked up to about mid calf. He does pain associated described as a sharp pain. He has a painful itchy spot on the lateral aspect of both feet.  He does endorse worsening balance.  Denies falls.  Denies allodynia to socks, shoes, and bedcovers.  Sometimes gets cramps in his feet but does not occur often. Appreciates that his left foot is darker as if \"the blood is pooling\" appreciates that in some of the toes on the right side.      Started having low back pain 2 years ago.  He had to stop working due to the low back pain.  Worse with standing. Has to frequently sit down. Heat radiates up his back. He will get pinpoint pain up his spine.  Takes a few days for the sensitivity to wear off after exerting himself.  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 Does feel like he has weakness and numbness into the right lateral anterior thigh.  He does feel weakness in both thighs with exertion. There is a spot in the midline of his low back pain that periodically swells up.  Reports pinpoint pain into the genitalia.  Feels like there is water dripping down his leg.  Feels like his body heats up and face will turn red.  He will then cool down after about 15 minutes.  Has right rib pain. Always sore.  Will be provoked by movement.     Has tried PT, ablation, and injections without much relief.      Denies neuromuscular problems in the family. Denies history of diabetes. Denies toxic " exposures including to chemotherapy and radiation.      He is on Norco for his low back pain. He is titrating off them.     He was started on Gabapentin 300 mg BID in the fall. Helps with the sharp body shocks.        Review of Records  - EMG/NCS (Dr. Lemons): Abnormal study.  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  There was no convincing electrodiagnostic evidence of right cervical or lumbosacral radiculopathies from this study.  The patient's pain and paresthesias at the right lateral thigh could be due to meralgia paresthetica, especially if lumbar spine imaging does not show any L2-L3 nerve impingement.     - MRI L spine: 1.  Minimal degenerative spondylosis. Disc space height is fairly well preserved.   2.  Multilevel mild-to-moderate facet arthropathy most significant at L3 through S1 levels. Some mild neural foraminal narrowing is present on the right at L4-L5. No nerve root displacement. No nerve root impingement.     MRI T spine WO: Scattered mild degenerative changes of the thoracic spine without high-grade spinal stenosis or neural foraminal narrowing.    Past Medical History:  - Chronic low back pain on chronic narcotics, anxiety, and hyperlipidemia     Social History:  , was formally a . Heavy smoker, quit 5 years ago.  Very little alcohol consumption. Uses cannabis for low back pain.     Physical Exam:  BP (!) 145/81   Pulse 63   Wt (!) 138.8 kg (306 lb 1.6 oz)   SpO2 97%   BMI 39.30 kg/m      General:  No acute distress  Cardiovascular: Normal rate.  Lung: Respirations are non-labored.  Extremities: Normal range of motion    Neurologic:  Mental status : alert, fund of knowledge  appropriate for visit    LANGUAGE: Speech fluent, no dysarthria     CN:  II- pupils equal and reactive, visual fields full  III, IV, VI- extraocular movements intact  V- sensation intact bilaterally  VII- face symmetric  VIII- hearing intact, no nystagmus  IX, X- palate elevates symmetrically  XI- sternocleidomastoid 5/5  XII- tongue midline    MOTOR : intact bulk and tone  5/5 strength in all ext    SENSORY : intact to pp, temperature, and vibration in BUE with maybe mild decrease to pp in the right index finger.  Tinel's negative at the wrist. Decrease to pp and temperature to about mid calf.  Vibration reduced to absent to the right lateral malleolus and the left knee.  Romberg with mild sway     REFLEXES:       Right Left   Triceps 3 3   Biceps 3 3   Brachioradialis 3 3   Knee jerk 2+ 2+   Ankle jerk 0 0   Bhardwaj absent   Cross adductors absent   Toes down going     MOVEMENT/COORDINATION: finger to nose without significant tremor, ataxia, and dysmetria.  Finger taps and rapid alternating movements appropriate speed and amplitude     GAIT : Walks toe out, mildly wider based.  Difficulty with tandem gait     Cognition and Speech: Speech clear and coherent.    Psychiatric: Cooperative, Appropriate mood & affect.     Assessment/Plan:   Garland Rice is a 70 year old male who presents for evaluation of neuropathy.  Some of the patient's symptoms are consistent with a length dependent polyneuropathy and supported by his exam and EMG. Will obtain labs looking for treatable causes.  Will also optimize his gabapentin to see if this helps with the pain in his feet.  He mentions a myriad of other symptoms most prominently his low back pain that is very positional. I discussed with him that I do not have a great answer for this. His EMG does not show an active radiculopathy and MRI L-spine some degenerative changes but nothing looks like it causing significant neural foraminal or canal stenosis.  We discussed that it  could be musculoskeletal in origin. Could consider a second opinion from spine and/or pain management.  He does report numbness in his right lateral thigh no major pain that could be secondary to meralgia paresthetica although he feels weakness is associated and radiating from his back.  Meralgia paresthetica is typically managed conservatively anyway with weight loss and loose fitting clothing +/- neuropathic pain agents.     Length dependent large fiber sensorimotor polyneuropathy, unclear etiology at this time   Low back pain   Possible meralgia paresthetica on the R  Mild median neuropathy - largely asymptomatic     Plan  > Check B1, B12, MMA, SPEP with IFEX, TSH, and A1c   > Titrate Gabapentin 300 mg BID to 600 mg TID with 1 capsule added/week   > Follow-up in 3 months       I spent 75 minutes providing care for this patient on the date of service, including reviewing imaging, labs, and notes as well as providing counseling and coordination of care for the above issues.

## 2025-07-10 NOTE — PATIENT INSTRUCTIONS
Take 1 capsule 3 times a day x 1 week     Then increase to 1 capsule in the AM, 1 capsule in the afternoon, and 2 capsules at bedtime x 1 week     Then increase to 1 capsule in the AM, 2 capsules in the afternoon, and 2 capsules at bedtime x 1 week     Then increase to 2 capsules 3 times a day (total 600 mg 3 times/day)

## 2025-07-10 NOTE — LETTER
"7/10/2025      Garland Rice  [address] [facility] [address] [id number]      Dear Colleague,    Thank you for referring your patient, Garland Rice, to the [facility]. Please see a copy of my visit note below.      Neurology Consultation    Patient Name:  Garland Rice  MRN:  [id number]    :  1955  Date of Service:  July 10, 2025  Primary care provider:  Jade Quinn        Chief Complaint: Neuropathy     History of Present Illness:     Garland Rice is a 70 year old male who presents for evaluation of neuropathy.  He reports starting several years started noticing numbness and tingling starting in the middle left toe and then went to the right middle toe. It then spread to the rest of the toes. Gradually spread to both feet and now has worked up to about mid calf. He does pain associated described as a sharp pain. He has a painful itchy spot on the lateral aspect of both feet.  He does endorse worsening balance.  Denies falls.  Denies allodynia to socks, shoes, and bedcovers.  Sometimes gets cramps in his feet but does not occur often. Appreciates that his left foot is darker as if \"the blood is pooling\" appreciates that in some of the toes on the right side.      Started having low back pain 2 years ago.  He had to stop working due to the low back pain.  Worse with standing. Has to frequently sit down. Heat radiates up his back. He will get pinpoint pain up his spine.  Takes a few days for the sensitivity to wear off after exerting himself.  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 Does feel like he has weakness and numbness into the right lateral anterior thigh.  He does feel weakness in both thighs with exertion. There is a spot in the midline of his low back pain that periodically swells up.  Reports pinpoint pain into the genitalia.  Feels like there is water dripping down his leg.  Feels like his body heats up and face will turn red.  He will " then cool down after about 15 minutes.  Has right rib pain. Always sore.  Will be provoked by movement.     Has tried PT, ablation, and injections without much relief.      Denies neuromuscular problems in the family. Denies history of diabetes. Denies toxic exposures including to chemotherapy and radiation.      He is on Norco for his low back pain. He is titrating off them.     He was started on Gabapentin 300 mg BID in the fall. Helps with the sharp body shocks.        Review of Records  - EMG/NCS (Dr. Lemons): Abnormal study.  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  There was no convincing electrodiagnostic evidence of right cervical or lumbosacral radiculopathies from this study.  The patient's pain and paresthesias at the right lateral thigh could be due to meralgia paresthetica, especially if lumbar spine imaging does not show any L2-L3 nerve impingement.     - MRI L spine: 1.  Minimal degenerative spondylosis. Disc space height is fairly well preserved.   2.  Multilevel mild-to-moderate facet arthropathy most significant at L3 through S1 levels. Some mild neural foraminal narrowing is present on the right at L4-L5. No nerve root displacement. No nerve root impingement.     MRI T spine WO: Scattered mild degenerative changes of the thoracic spine without high-grade spinal stenosis or neural foraminal narrowing.    Past Medical History:  - Chronic low back pain on chronic narcotics, anxiety, and hyperlipidemia     Social History:  , was formally a . Heavy smoker, quit 5 years ago.  Very little alcohol consumption. Uses cannabis for low back pain.     Physical Exam:  BP (!) 145/81   Pulse 63    Wt (!) 138.8 kg (306 lb 1.6 oz)   SpO2 97%   BMI 39.30 kg/m      General:  No acute distress  Cardiovascular: Normal rate.  Lung: Respirations are non-labored.  Extremities: Normal range of motion    Neurologic:  Mental status : alert, fund of knowledge appropriate for visit    LANGUAGE: Speech fluent, no dysarthria     CN:  II- pupils equal and reactive, visual fields full  III, IV, VI- extraocular movements intact  V- sensation intact bilaterally  VII- face symmetric  VIII- hearing intact, no nystagmus  IX, X- palate elevates symmetrically  XI- sternocleidomastoid 5/5  XII- tongue midline    MOTOR : intact bulk and tone  5/5 strength in all ext    SENSORY : intact to pp, temperature, and vibration in BUE with maybe mild decrease to pp in the right index finger.  Tinel's negative at the wrist. Decrease to pp and temperature to about mid calf.  Vibration reduced to absent to the right lateral malleolus and the left knee.  Romberg with mild sway     REFLEXES:       Right Left   Triceps 3 3   Biceps 3 3   Brachioradialis 3 3   Knee jerk 2+ 2+   Ankle jerk 0 0   Bhardwaj absent   Cross adductors absent   Toes down going     MOVEMENT/COORDINATION: finger to nose without significant tremor, ataxia, and dysmetria.  Finger taps and rapid alternating movements appropriate speed and amplitude     GAIT : Walks toe out, mildly wider based.  Difficulty with tandem gait     Cognition and Speech: Speech clear and coherent.    Psychiatric: Cooperative, Appropriate mood & affect.     Assessment/Plan:   Garland Rice is a 70 year old male who presents for evaluation of neuropathy.  Some of the patient's symptoms are consistent with a length dependent polyneuropathy and supported by his exam and EMG. Will obtain labs looking for treatable causes.  Will also optimize his gabapentin to see if this helps with the pain in his feet.  He mentions a myriad of other symptoms most prominently his low back pain that is very positional.  I discussed with him that I do not have a great answer for this. His EMG does not show an active radiculopathy and MRI L-spine some degenerative changes but nothing looks like it causing significant neural foraminal or canal stenosis.  We discussed that it could be musculoskeletal in origin. Could consider a second opinion from spine and/or pain management.  He does report numbness in his right lateral thigh no major pain that could be secondary to meralgia paresthetica although he feels weakness is associated and radiating from his back.  Meralgia paresthetica is typically managed conservatively anyway with weight loss and loose fitting clothing +/- neuropathic pain agents.     Length dependent large fiber sensorimotor polyneuropathy, unclear etiology at this time   Low back pain   Possible meralgia paresthetica on the R  Mild median neuropathy - largely asymptomatic     Plan  > Check B1, B12, MMA, SPEP with IFEX, TSH, and A1c   > Titrate Gabapentin 300 mg BID to 600 mg TID with 1 capsule added/week   > Follow-up in 3 months       I spent 75 minutes providing care for this patient on the date of service, including reviewing imaging, labs, and notes as well as providing counseling and coordination of care for the above issues.      Again, thank you for allowing me to participate in the care of your patient.        Sincerely,        Norma Booker, DO    Electronically signed

## 2025-07-11 ENCOUNTER — LAB (OUTPATIENT)
Dept: LAB | Facility: OTHER | Age: 70
End: 2025-07-11
Attending: PSYCHIATRY & NEUROLOGY
Payer: COMMERCIAL

## 2025-07-11 DIAGNOSIS — R90.89 OTHER ABNORMAL FINDINGS ON DIAGNOSTIC IMAGING OF CENTRAL NERVOUS SYSTEM: ICD-10-CM

## 2025-07-11 DIAGNOSIS — G62.9 PERIPHERAL POLYNEUROPATHY: ICD-10-CM

## 2025-07-11 DIAGNOSIS — R79.89 OTHER SPECIFIED ABNORMAL FINDINGS OF BLOOD CHEMISTRY: ICD-10-CM

## 2025-07-11 LAB
EST. AVERAGE GLUCOSE BLD GHB EST-MCNC: 120 MG/DL
HBA1C MFR BLD: 5.8 %
TOTAL PROTEIN SERUM FOR ELP: 6.9 G/DL (ref 6.4–8.3)
TSH SERPL DL<=0.005 MIU/L-ACNC: 0.63 UIU/ML (ref 0.3–4.2)
VIT B12 SERPL-MCNC: 643 PG/ML (ref 232–1245)

## 2025-07-11 PROCEDURE — 83921 ORGANIC ACID SINGLE QUANT: CPT | Mod: ZL

## 2025-07-11 PROCEDURE — 84443 ASSAY THYROID STIM HORMONE: CPT | Mod: ZL

## 2025-07-11 PROCEDURE — 84155 ASSAY OF PROTEIN SERUM: CPT | Mod: ZL

## 2025-07-11 PROCEDURE — 84425 ASSAY OF VITAMIN B-1: CPT | Mod: ZL

## 2025-07-11 PROCEDURE — 36415 COLL VENOUS BLD VENIPUNCTURE: CPT | Mod: ZL

## 2025-07-11 PROCEDURE — 84165 PROTEIN E-PHORESIS SERUM: CPT | Mod: TC,ZL | Performed by: PATHOLOGY

## 2025-07-11 PROCEDURE — 82607 VITAMIN B-12: CPT | Mod: ZL

## 2025-07-11 PROCEDURE — 83036 HEMOGLOBIN GLYCOSYLATED A1C: CPT | Mod: ZL

## 2025-07-11 PROCEDURE — 86334 IMMUNOFIX E-PHORESIS SERUM: CPT | Mod: ZL | Performed by: PATHOLOGY

## 2025-07-14 LAB
ALBUMIN SERPL ELPH-MCNC: 4.2 G/DL (ref 3.7–5.1)
ALPHA1 GLOB SERPL ELPH-MCNC: 0.3 G/DL (ref 0.2–0.4)
ALPHA2 GLOB SERPL ELPH-MCNC: 0.8 G/DL (ref 0.5–0.9)
B-GLOBULIN SERPL ELPH-MCNC: 0.7 G/DL (ref 0.6–1)
GAMMA GLOB SERPL ELPH-MCNC: 0.8 G/DL (ref 0.7–1.6)
M PROTEIN SERPL ELPH-MCNC: 0 G/DL
PROT PATTERN SERPL ELPH-IMP: NORMAL
PROT PATTERN SERPL IFE-IMP: NORMAL

## 2025-07-15 ENCOUNTER — OFFICE VISIT (OUTPATIENT)
Dept: FAMILY MEDICINE | Facility: OTHER | Age: 70
End: 2025-07-15
Attending: NURSE PRACTITIONER
Payer: COMMERCIAL

## 2025-07-15 VITALS
WEIGHT: 310.6 LBS | SYSTOLIC BLOOD PRESSURE: 126 MMHG | RESPIRATION RATE: 20 BRPM | OXYGEN SATURATION: 95 % | BODY MASS INDEX: 39.88 KG/M2 | HEART RATE: 76 BPM | DIASTOLIC BLOOD PRESSURE: 82 MMHG

## 2025-07-15 DIAGNOSIS — F41.9 ANXIETY: ICD-10-CM

## 2025-07-15 DIAGNOSIS — G89.29 CHRONIC BILATERAL LOW BACK PAIN WITHOUT SCIATICA: ICD-10-CM

## 2025-07-15 DIAGNOSIS — M51.369 DEGENERATION OF INTERVERTEBRAL DISC OF LUMBAR REGION, UNSPECIFIED WHETHER PAIN PRESENT: ICD-10-CM

## 2025-07-15 DIAGNOSIS — G62.9 PERIPHERAL POLYNEUROPATHY: Primary | ICD-10-CM

## 2025-07-15 DIAGNOSIS — F32.A DEPRESSION, UNSPECIFIED DEPRESSION TYPE: ICD-10-CM

## 2025-07-15 DIAGNOSIS — M54.50 CHRONIC BILATERAL LOW BACK PAIN WITHOUT SCIATICA: ICD-10-CM

## 2025-07-15 LAB — VIT B1 PYROPHOSHATE BLD-SCNC: 165 NMOL/L

## 2025-07-15 PROCEDURE — G0463 HOSPITAL OUTPT CLINIC VISIT: HCPCS

## 2025-07-15 RX ORDER — DULOXETIN HYDROCHLORIDE 30 MG/1
30 CAPSULE, DELAYED RELEASE ORAL DAILY
Qty: 14 CAPSULE | Refills: 0 | Status: SHIPPED | OUTPATIENT
Start: 2025-07-15

## 2025-07-15 RX ORDER — DULOXETIN HYDROCHLORIDE 60 MG/1
60 CAPSULE, DELAYED RELEASE ORAL DAILY
Qty: 90 CAPSULE | Refills: 4 | Status: SHIPPED | OUTPATIENT
Start: 2025-07-15

## 2025-07-15 ASSESSMENT — ANXIETY QUESTIONNAIRES
3. WORRYING TOO MUCH ABOUT DIFFERENT THINGS: SEVERAL DAYS
2. NOT BEING ABLE TO STOP OR CONTROL WORRYING: SEVERAL DAYS
GAD7 TOTAL SCORE: 4
IF YOU CHECKED OFF ANY PROBLEMS ON THIS QUESTIONNAIRE, HOW DIFFICULT HAVE THESE PROBLEMS MADE IT FOR YOU TO DO YOUR WORK, TAKE CARE OF THINGS AT HOME, OR GET ALONG WITH OTHER PEOPLE: SOMEWHAT DIFFICULT
GAD7 TOTAL SCORE: 4
7. FEELING AFRAID AS IF SOMETHING AWFUL MIGHT HAPPEN: NOT AT ALL
1. FEELING NERVOUS, ANXIOUS, OR ON EDGE: SEVERAL DAYS
GAD7 TOTAL SCORE: 4
7. FEELING AFRAID AS IF SOMETHING AWFUL MIGHT HAPPEN: NOT AT ALL
6. BECOMING EASILY ANNOYED OR IRRITABLE: NOT AT ALL
4. TROUBLE RELAXING: SEVERAL DAYS
5. BEING SO RESTLESS THAT IT IS HARD TO SIT STILL: NOT AT ALL
8. IF YOU CHECKED OFF ANY PROBLEMS, HOW DIFFICULT HAVE THESE MADE IT FOR YOU TO DO YOUR WORK, TAKE CARE OF THINGS AT HOME, OR GET ALONG WITH OTHER PEOPLE?: SOMEWHAT DIFFICULT

## 2025-07-15 ASSESSMENT — PAIN SCALES - GENERAL: PAINLEVEL_OUTOF10: MILD PAIN (2)

## 2025-07-15 ASSESSMENT — PATIENT HEALTH QUESTIONNAIRE - PHQ9
SUM OF ALL RESPONSES TO PHQ QUESTIONS 1-9: 4
SUM OF ALL RESPONSES TO PHQ QUESTIONS 1-9: 4
10. IF YOU CHECKED OFF ANY PROBLEMS, HOW DIFFICULT HAVE THESE PROBLEMS MADE IT FOR YOU TO DO YOUR WORK, TAKE CARE OF THINGS AT HOME, OR GET ALONG WITH OTHER PEOPLE: NOT DIFFICULT AT ALL

## 2025-07-15 NOTE — PROGRESS NOTES
Assessment & Plan   Problem List Items Addressed This Visit          Neurology/Sleep    Peripheral polyneuropathy - Primary    Relevant Medications    DULoxetine (CYMBALTA) 30 MG capsule    DULoxetine (CYMBALTA) 60 MG capsule       Orthopedic/Musculoskeletal    Chronic bilateral low back pain without sciatica    Relevant Medications    DULoxetine (CYMBALTA) 30 MG capsule    DULoxetine (CYMBALTA) 60 MG capsule    DDD (degenerative disc disease), lumbar    Relevant Medications    DULoxetine (CYMBALTA) 30 MG capsule    DULoxetine (CYMBALTA) 60 MG capsule     Other Visit Diagnoses         Anxiety        Relevant Medications    DULoxetine (CYMBALTA) 30 MG capsule    DULoxetine (CYMBALTA) 60 MG capsule      Depression, unspecified depression type        Relevant Medications    DULoxetine (CYMBALTA) 30 MG capsule    DULoxetine (CYMBALTA) 60 MG capsule           He is happy with the discontinuation of hydrocodone, plan to continue with gabapentin per neurology, he is aware that we can increase dose further if needed.  Will have him stop fluoxetine as he is on a low-dose.  Starting tomorrow, will start duloxetine 30 mg daily for 1 to 2 weeks and then increase to 60 mg daily.  Discussed side effects of medication, goal is to help treat anxiety, depression as well as his neuropathic and chronic musculoskeletal pain.  Continue with Xanax as prescribed, and goal will be to work on reducing this in the future as well.  I would like to see him back in 4 to 6 weeks, sooner with any concerns.    The longitudinal plan of care for the diagnosis(es)/condition(s) as documented were addressed during this visit. Due to the added complexity in care, I will continue to support Jorge L in the subsequent management and with ongoing continuity of care.      Nichole Coats is a 70 year old, presenting for the following health issues:  Medication Therapy Management    History of Present Illness       Back Pain:  He presents for follow up of  back pain. Patient's back pain is a chronic problem.  Location of back pain:  Right lower back, left lower back, right middle of back, left middle of back, right buttock and right hip  Description of back pain: burning, dull ache, fullness, gnawing, sharp and shooting  Back pain spreads: right buttocks, left buttocks and right thigh    Since patient first noticed back pain, pain is: always present, but gets better and worse  Does back pain interfere with his job:  Yes       He eats 2-3 servings of fruits and vegetables daily.He consumes 1 sweetened beverage(s) daily.He exercises with enough effort to increase his heart rate 30 to 60 minutes per day.  He exercises with enough effort to increase his heart rate 5 days per week.   He is taking medications regularly.      He presents to clinic today for follow-up on pain management.  He was seen approximately 1 month ago, plan at that time was to work on tapering off of opioids. Since then he has been seen by neurology, gabapentin increased.  He has come off of the hydrocodone completely.  He did not have any negative side effects with coming off the medication.  Does report he feels pain quicker with activities, always has some pain, typically along his sacral region and radiates side-to-side.  Also has radiating up into his back.  He has not noticed any significant change with increasing gabapentin.  Today he feels his mood has fluctuated some but otherwise has been fairly stable.  Per last visit the plan was to stop fluoxetine and start Cymbalta.  He does see neurology on October 20 again.      Objective    /82   Pulse 76   Resp 20   Wt (!) 140.9 kg (310 lb 9.6 oz)   SpO2 95%   BMI 39.88 kg/m    Body mass index is 39.88 kg/m .  Physical Exam   GENERAL: alert and no distress  EYES: Eyes grossly normal to inspection  NEURO: Normal strength and tone, mentation intact and speech normal  PSYCH: mentation appears normal, affect normal/bright            Signed  Electronically by: ERROL Mcknight CNP

## 2025-07-15 NOTE — NURSING NOTE
Patient presents today for follow up on chronic pain.    Medication Reconciliation Complete    Sandra Cortes LPN  7/15/2025 8:25 AM

## 2025-07-17 LAB — METHYLMALONATE SERPL-SCNC: 0.3 UMOL/L (ref 0–0.4)

## 2025-07-29 ENCOUNTER — HOSPITAL ENCOUNTER (OUTPATIENT)
Dept: CT IMAGING | Facility: OTHER | Age: 70
Discharge: HOME | End: 2025-07-29
Payer: COMMERCIAL

## 2025-07-29 DIAGNOSIS — R91.8 PULMONARY NODULES: ICD-10-CM

## 2025-07-29 PROCEDURE — 71250 CT THORAX DX C-: CPT

## 2025-07-29 PROCEDURE — 71250 CT THORAX DX C-: CPT | Mod: 26 | Performed by: RADIOLOGY

## 2025-08-01 ENCOUNTER — APPOINTMENT (OUTPATIENT)
Dept: CT IMAGING | Facility: OTHER | Age: 70
End: 2025-08-01
Attending: EMERGENCY MEDICINE
Payer: COMMERCIAL

## 2025-08-01 ENCOUNTER — HOSPITAL ENCOUNTER (EMERGENCY)
Facility: OTHER | Age: 70
Discharge: HOME | End: 2025-08-01
Attending: EMERGENCY MEDICINE
Payer: COMMERCIAL

## 2025-08-01 VITALS
DIASTOLIC BLOOD PRESSURE: 92 MMHG | SYSTOLIC BLOOD PRESSURE: 168 MMHG | OXYGEN SATURATION: 98 % | TEMPERATURE: 97.2 F | RESPIRATION RATE: 15 BRPM | HEART RATE: 74 BPM

## 2025-08-01 DIAGNOSIS — R10.9 ACUTE ABDOMINAL PAIN: Primary | ICD-10-CM

## 2025-08-01 LAB
ALBUMIN SERPL BCG-MCNC: 4 G/DL (ref 3.5–5.2)
ALBUMIN UR-MCNC: NEGATIVE MG/DL
ALP SERPL-CCNC: 76 U/L (ref 40–150)
ALT SERPL W P-5'-P-CCNC: 29 U/L (ref 0–70)
ANION GAP SERPL CALCULATED.3IONS-SCNC: 11 MMOL/L (ref 7–15)
APPEARANCE UR: CLEAR
AST SERPL W P-5'-P-CCNC: 25 U/L (ref 0–45)
BASOPHILS # BLD AUTO: 0 10E3/UL (ref 0–0.2)
BASOPHILS NFR BLD AUTO: 1 %
BILIRUB SERPL-MCNC: 0.3 MG/DL
BILIRUB UR QL STRIP: NEGATIVE
BUN SERPL-MCNC: 20.2 MG/DL (ref 8–23)
CALCIUM SERPL-MCNC: 9.2 MG/DL (ref 8.8–10.4)
CHLORIDE SERPL-SCNC: 105 MMOL/L (ref 98–107)
COLOR UR AUTO: YELLOW
CREAT SERPL-MCNC: 0.92 MG/DL (ref 0.67–1.17)
EGFRCR SERPLBLD CKD-EPI 2021: >90 ML/MIN/1.73M2
EOSINOPHIL # BLD AUTO: 0.3 10E3/UL (ref 0–0.7)
EOSINOPHIL NFR BLD AUTO: 3 %
ERYTHROCYTE [DISTWIDTH] IN BLOOD BY AUTOMATED COUNT: 13.7 % (ref 10–15)
GLUCOSE SERPL-MCNC: 132 MG/DL (ref 70–99)
GLUCOSE UR STRIP-MCNC: NEGATIVE MG/DL
HCO3 SERPL-SCNC: 23 MMOL/L (ref 22–29)
HCT VFR BLD AUTO: 42 % (ref 40–53)
HGB BLD-MCNC: 14.3 G/DL (ref 13.3–17.7)
HGB UR QL STRIP: NEGATIVE
IMM GRANULOCYTES # BLD: 0 10E3/UL
IMM GRANULOCYTES NFR BLD: 0 %
KETONES UR STRIP-MCNC: NEGATIVE MG/DL
LEUKOCYTE ESTERASE UR QL STRIP: NEGATIVE
LIPASE SERPL-CCNC: 181 U/L (ref 13–60)
LYMPHOCYTES # BLD AUTO: 2.8 10E3/UL (ref 0.8–5.3)
LYMPHOCYTES NFR BLD AUTO: 33 %
MCH RBC QN AUTO: 32 PG (ref 26.5–33)
MCHC RBC AUTO-ENTMCNC: 34 G/DL (ref 31.5–36.5)
MCV RBC AUTO: 94 FL (ref 78–100)
MONOCYTES # BLD AUTO: 0.9 10E3/UL (ref 0–1.3)
MONOCYTES NFR BLD AUTO: 10 %
MUCOUS THREADS #/AREA URNS LPF: PRESENT /LPF
NEUTROPHILS # BLD AUTO: 4.6 10E3/UL (ref 1.6–8.3)
NEUTROPHILS NFR BLD AUTO: 54 %
NITRATE UR QL: NEGATIVE
NRBC # BLD AUTO: 0 10E3/UL
NRBC BLD AUTO-RTO: 0 /100
PH UR STRIP: 5.5 [PH] (ref 5–9)
PLATELET # BLD AUTO: 279 10E3/UL (ref 150–450)
POTASSIUM SERPL-SCNC: 4.1 MMOL/L (ref 3.4–5.3)
PROT SERPL-MCNC: 6.9 G/DL (ref 6.4–8.3)
RBC # BLD AUTO: 4.47 10E6/UL (ref 4.4–5.9)
RBC URINE: 1 /HPF
SODIUM SERPL-SCNC: 139 MMOL/L (ref 135–145)
SP GR UR STRIP: <=1.005 (ref 1–1.03)
UROBILINOGEN UR STRIP-MCNC: NORMAL MG/DL
WBC # BLD AUTO: 8.5 10E3/UL (ref 4–11)
WBC URINE: <1 /HPF

## 2025-08-01 PROCEDURE — 74177 CT ABD & PELVIS W/CONTRAST: CPT

## 2025-08-01 PROCEDURE — 250N000011 HC RX IP 250 OP 636: Performed by: EMERGENCY MEDICINE

## 2025-08-01 PROCEDURE — 99284 EMERGENCY DEPT VISIT MOD MDM: CPT | Performed by: EMERGENCY MEDICINE

## 2025-08-01 PROCEDURE — 99285 EMERGENCY DEPT VISIT HI MDM: CPT | Mod: 25 | Performed by: EMERGENCY MEDICINE

## 2025-08-01 PROCEDURE — 84450 TRANSFERASE (AST) (SGOT): CPT | Performed by: EMERGENCY MEDICINE

## 2025-08-01 PROCEDURE — 83690 ASSAY OF LIPASE: CPT | Performed by: EMERGENCY MEDICINE

## 2025-08-01 PROCEDURE — 96374 THER/PROPH/DIAG INJ IV PUSH: CPT | Performed by: EMERGENCY MEDICINE

## 2025-08-01 PROCEDURE — 85025 COMPLETE CBC W/AUTO DIFF WBC: CPT | Performed by: EMERGENCY MEDICINE

## 2025-08-01 PROCEDURE — 81001 URINALYSIS AUTO W/SCOPE: CPT | Performed by: EMERGENCY MEDICINE

## 2025-08-01 PROCEDURE — 250N000009 HC RX 250: Performed by: EMERGENCY MEDICINE

## 2025-08-01 PROCEDURE — 36415 COLL VENOUS BLD VENIPUNCTURE: CPT | Performed by: EMERGENCY MEDICINE

## 2025-08-01 PROCEDURE — 74177 CT ABD & PELVIS W/CONTRAST: CPT | Mod: 26 | Performed by: RADIOLOGY

## 2025-08-01 RX ORDER — SODIUM CHLORIDE 9 MG/ML
INJECTION, SOLUTION INTRAVENOUS CONTINUOUS
Status: DISCONTINUED | OUTPATIENT
Start: 2025-08-01 | End: 2025-08-01 | Stop reason: HOSPADM

## 2025-08-01 RX ORDER — IOPAMIDOL 755 MG/ML
150 INJECTION, SOLUTION INTRAVASCULAR ONCE
Status: COMPLETED | OUTPATIENT
Start: 2025-08-01 | End: 2025-08-01

## 2025-08-01 RX ORDER — HYDROMORPHONE HYDROCHLORIDE 1 MG/ML
0.5 INJECTION, SOLUTION INTRAMUSCULAR; INTRAVENOUS; SUBCUTANEOUS EVERY 30 MIN PRN
Refills: 0 | Status: DISCONTINUED | OUTPATIENT
Start: 2025-08-01 | End: 2025-08-01 | Stop reason: HOSPADM

## 2025-08-01 RX ORDER — OXYCODONE AND ACETAMINOPHEN 5; 325 MG/1; MG/1
1 TABLET ORAL EVERY 6 HOURS PRN
Qty: 6 TABLET | Refills: 0 | Status: SHIPPED | OUTPATIENT
Start: 2025-08-01 | End: 2025-08-12

## 2025-08-01 RX ADMIN — IOPAMIDOL 150 ML: 755 INJECTION, SOLUTION INTRAVENOUS at 02:51

## 2025-08-01 RX ADMIN — SODIUM CHLORIDE 60 ML: 9 INJECTION, SOLUTION INTRAVENOUS at 02:53

## 2025-08-01 RX ADMIN — HYDROMORPHONE HYDROCHLORIDE 0.5 MG: 1 INJECTION, SOLUTION INTRAMUSCULAR; INTRAVENOUS; SUBCUTANEOUS at 02:05

## 2025-08-01 ASSESSMENT — ENCOUNTER SYMPTOMS
VOMITING: 0
CHILLS: 1
MUSCULOSKELETAL NEGATIVE: 1
NEUROLOGICAL NEGATIVE: 1
ABDOMINAL PAIN: 1
FEVER: 0
BLOOD IN STOOL: 0
CONSTIPATION: 0
DIARRHEA: 0
RESPIRATORY NEGATIVE: 1
CARDIOVASCULAR NEGATIVE: 1

## 2025-08-01 ASSESSMENT — COLUMBIA-SUICIDE SEVERITY RATING SCALE - C-SSRS
6. HAVE YOU EVER DONE ANYTHING, STARTED TO DO ANYTHING, OR PREPARED TO DO ANYTHING TO END YOUR LIFE?: NO
1. IN THE PAST MONTH, HAVE YOU WISHED YOU WERE DEAD OR WISHED YOU COULD GO TO SLEEP AND NOT WAKE UP?: NO
2. HAVE YOU ACTUALLY HAD ANY THOUGHTS OF KILLING YOURSELF IN THE PAST MONTH?: NO

## 2025-08-01 ASSESSMENT — ACTIVITIES OF DAILY LIVING (ADL)
ADLS_ACUITY_SCORE: 41

## 2025-08-12 ENCOUNTER — OFFICE VISIT (OUTPATIENT)
Dept: FAMILY MEDICINE | Facility: OTHER | Age: 70
End: 2025-08-12
Attending: NURSE PRACTITIONER
Payer: COMMERCIAL

## 2025-08-12 ENCOUNTER — MYC MEDICAL ADVICE (OUTPATIENT)
Dept: FAMILY MEDICINE | Facility: OTHER | Age: 70
End: 2025-08-12

## 2025-08-12 VITALS
HEART RATE: 80 BPM | OXYGEN SATURATION: 95 % | BODY MASS INDEX: 39.42 KG/M2 | RESPIRATION RATE: 20 BRPM | DIASTOLIC BLOOD PRESSURE: 84 MMHG | SYSTOLIC BLOOD PRESSURE: 130 MMHG | WEIGHT: 307 LBS

## 2025-08-12 DIAGNOSIS — R93.2 ABNORMAL CT SCAN, GALLBLADDER: Primary | ICD-10-CM

## 2025-08-12 DIAGNOSIS — G62.9 PERIPHERAL POLYNEUROPATHY: ICD-10-CM

## 2025-08-12 DIAGNOSIS — R19.5 CHANGE IN STOOL CALIBER: ICD-10-CM

## 2025-08-12 DIAGNOSIS — F41.9 ANXIETY: ICD-10-CM

## 2025-08-12 DIAGNOSIS — R10.84 ABDOMINAL PAIN, GENERALIZED: ICD-10-CM

## 2025-08-12 DIAGNOSIS — G89.29 CHRONIC BILATERAL LOW BACK PAIN WITHOUT SCIATICA: ICD-10-CM

## 2025-08-12 DIAGNOSIS — M54.50 CHRONIC BILATERAL LOW BACK PAIN WITHOUT SCIATICA: ICD-10-CM

## 2025-08-12 DIAGNOSIS — F32.A DEPRESSION, UNSPECIFIED DEPRESSION TYPE: ICD-10-CM

## 2025-08-12 DIAGNOSIS — M51.369 DEGENERATION OF INTERVERTEBRAL DISC OF LUMBAR REGION, UNSPECIFIED WHETHER PAIN PRESENT: ICD-10-CM

## 2025-08-12 PROCEDURE — G0463 HOSPITAL OUTPT CLINIC VISIT: HCPCS

## 2025-08-12 RX ORDER — OXYCODONE AND ACETAMINOPHEN 5; 325 MG/1; MG/1
1 TABLET ORAL EVERY 6 HOURS PRN
Qty: 3 TABLET | Refills: 0 | Status: SHIPPED | OUTPATIENT
Start: 2025-08-12

## 2025-08-12 RX ORDER — OXYCODONE AND ACETAMINOPHEN 5; 325 MG/1; MG/1
1 TABLET ORAL EVERY 6 HOURS PRN
Qty: 6 TABLET | Refills: 0 | Status: CANCELLED | OUTPATIENT
Start: 2025-08-12

## 2025-08-12 RX ORDER — DULOXETIN HYDROCHLORIDE 30 MG/1
30 CAPSULE, DELAYED RELEASE ORAL DAILY
Qty: 90 CAPSULE | Refills: 0 | Status: SHIPPED | OUTPATIENT
Start: 2025-08-12

## 2025-08-12 ASSESSMENT — ANXIETY QUESTIONNAIRES
7. FEELING AFRAID AS IF SOMETHING AWFUL MIGHT HAPPEN: NOT AT ALL
GAD7 TOTAL SCORE: 2
5. BEING SO RESTLESS THAT IT IS HARD TO SIT STILL: SEVERAL DAYS
1. FEELING NERVOUS, ANXIOUS, OR ON EDGE: NOT AT ALL
GAD7 TOTAL SCORE: 2
3. WORRYING TOO MUCH ABOUT DIFFERENT THINGS: NOT AT ALL
GAD7 TOTAL SCORE: 2
6. BECOMING EASILY ANNOYED OR IRRITABLE: NOT AT ALL
2. NOT BEING ABLE TO STOP OR CONTROL WORRYING: NOT AT ALL
4. TROUBLE RELAXING: SEVERAL DAYS
7. FEELING AFRAID AS IF SOMETHING AWFUL MIGHT HAPPEN: NOT AT ALL
IF YOU CHECKED OFF ANY PROBLEMS ON THIS QUESTIONNAIRE, HOW DIFFICULT HAVE THESE PROBLEMS MADE IT FOR YOU TO DO YOUR WORK, TAKE CARE OF THINGS AT HOME, OR GET ALONG WITH OTHER PEOPLE: SOMEWHAT DIFFICULT
8. IF YOU CHECKED OFF ANY PROBLEMS, HOW DIFFICULT HAVE THESE MADE IT FOR YOU TO DO YOUR WORK, TAKE CARE OF THINGS AT HOME, OR GET ALONG WITH OTHER PEOPLE?: SOMEWHAT DIFFICULT

## 2025-08-12 ASSESSMENT — PATIENT HEALTH QUESTIONNAIRE - PHQ9
SUM OF ALL RESPONSES TO PHQ QUESTIONS 1-9: 14
SUM OF ALL RESPONSES TO PHQ QUESTIONS 1-9: 14
10. IF YOU CHECKED OFF ANY PROBLEMS, HOW DIFFICULT HAVE THESE PROBLEMS MADE IT FOR YOU TO DO YOUR WORK, TAKE CARE OF THINGS AT HOME, OR GET ALONG WITH OTHER PEOPLE: SOMEWHAT DIFFICULT

## 2025-08-12 ASSESSMENT — PAIN SCALES - GENERAL: PAINLEVEL_OUTOF10: MILD PAIN (3)

## 2025-08-12 ASSESSMENT — ENCOUNTER SYMPTOMS: NERVOUS/ANXIOUS: 1

## 2025-08-19 ENCOUNTER — RESULTS FOLLOW-UP (OUTPATIENT)
Dept: FAMILY MEDICINE | Facility: OTHER | Age: 70
End: 2025-08-19

## 2025-08-19 ENCOUNTER — HOSPITAL ENCOUNTER (OUTPATIENT)
Dept: ULTRASOUND IMAGING | Facility: OTHER | Age: 70
Discharge: HOME | End: 2025-08-19
Attending: NURSE PRACTITIONER
Payer: COMMERCIAL

## 2025-08-19 DIAGNOSIS — R93.2 ABNORMAL CT SCAN, GALLBLADDER: ICD-10-CM

## 2025-08-19 PROCEDURE — 76700 US EXAM ABDOM COMPLETE: CPT

## 2025-08-19 PROCEDURE — 76700 US EXAM ABDOM COMPLETE: CPT | Mod: 26 | Performed by: RADIOLOGY

## 2025-09-02 DIAGNOSIS — G47.33 OBSTRUCTIVE SLEEP APNEA (ADULT) (PEDIATRIC): Primary | ICD-10-CM

## (undated) DEVICE — TUBING SUCTION 10'X3/16" N510

## (undated) DEVICE — ENDO KIT COMPLIANCE DYKENDOCMPLY

## (undated) DEVICE — ENDO BRUSH CHANNEL MASTER CLEANING 2-4.2MM BW-412T

## (undated) DEVICE — SUCTION MANIFOLD NEPTUNE 2 SYS 4 PORT 0702-020-000

## (undated) DEVICE — ESU ENDO FORCEP BX HOT FD-210U

## (undated) DEVICE — SOL WATER 1500ML

## (undated) DEVICE — ESU GROUND PAD ADULT W/CORD E7507

## (undated) RX ORDER — PROPOFOL 10 MG/ML
INJECTION, EMULSION INTRAVENOUS
Status: DISPENSED
Start: 2021-03-15

## (undated) RX ORDER — METOCLOPRAMIDE HYDROCHLORIDE 5 MG/ML
INJECTION INTRAMUSCULAR; INTRAVENOUS
Status: DISPENSED
Start: 2025-05-10

## (undated) RX ORDER — DEXAMETHASONE SODIUM PHOSPHATE 10 MG/ML
INJECTION, SOLUTION INTRAMUSCULAR; INTRAVENOUS
Status: DISPENSED
Start: 2024-01-24

## (undated) RX ORDER — LIDOCAINE HYDROCHLORIDE 10 MG/ML
INJECTION, SOLUTION INFILTRATION; PERINEURAL
Status: DISPENSED
Start: 2024-01-24

## (undated) RX ORDER — KETOROLAC TROMETHAMINE 30 MG/ML
INJECTION, SOLUTION INTRAMUSCULAR; INTRAVENOUS
Status: DISPENSED
Start: 2025-05-13

## (undated) RX ORDER — HYDROMORPHONE HYDROCHLORIDE 1 MG/ML
INJECTION, SOLUTION INTRAMUSCULAR; INTRAVENOUS; SUBCUTANEOUS
Status: DISPENSED
Start: 2025-08-01

## (undated) RX ORDER — KETOROLAC TROMETHAMINE 15 MG/ML
INJECTION, SOLUTION INTRAMUSCULAR; INTRAVENOUS
Status: DISPENSED
Start: 2025-05-10

## (undated) RX ORDER — DIPHENHYDRAMINE HYDROCHLORIDE 50 MG/ML
INJECTION, SOLUTION INTRAMUSCULAR; INTRAVENOUS
Status: DISPENSED
Start: 2025-05-10

## (undated) RX ORDER — LIDOCAINE HYDROCHLORIDE 10 MG/ML
INJECTION, SOLUTION EPIDURAL; INFILTRATION; INTRACAUDAL; PERINEURAL
Status: DISPENSED
Start: 2024-01-24